# Patient Record
Sex: MALE | Race: WHITE | Employment: OTHER | URBAN - METROPOLITAN AREA
[De-identification: names, ages, dates, MRNs, and addresses within clinical notes are randomized per-mention and may not be internally consistent; named-entity substitution may affect disease eponyms.]

---

## 2017-01-04 DIAGNOSIS — J42 CHRONIC BRONCHITIS, UNSPECIFIED CHRONIC BRONCHITIS TYPE (HCC): ICD-10-CM

## 2017-01-30 ENCOUNTER — OFFICE VISIT (OUTPATIENT)
Dept: INTERNAL MEDICINE CLINIC | Age: 72
End: 2017-01-30

## 2017-01-30 VITALS
OXYGEN SATURATION: 95 % | WEIGHT: 138.6 LBS | SYSTOLIC BLOOD PRESSURE: 122 MMHG | DIASTOLIC BLOOD PRESSURE: 80 MMHG | HEIGHT: 67 IN | RESPIRATION RATE: 14 BRPM | BODY MASS INDEX: 21.75 KG/M2

## 2017-01-30 DIAGNOSIS — I70.0 ATHEROSCLEROSIS OF AORTIC ARCH (HCC): Primary | ICD-10-CM

## 2017-01-30 DIAGNOSIS — E78.2 HYPERLIPIDEMIA, MIXED: ICD-10-CM

## 2017-01-30 DIAGNOSIS — Z00.00 PREVENTATIVE HEALTH CARE: ICD-10-CM

## 2017-01-30 DIAGNOSIS — J42 CHRONIC BRONCHITIS, UNSPECIFIED CHRONIC BRONCHITIS TYPE (HCC): ICD-10-CM

## 2017-01-30 DIAGNOSIS — R07.89 ATYPICAL CHEST PAIN: ICD-10-CM

## 2017-01-30 PROCEDURE — 99214 OFFICE O/P EST MOD 30 MIN: CPT | Performed by: INTERNAL MEDICINE

## 2017-01-30 RX ORDER — ALBUTEROL SULFATE 90 UG/1
2 AEROSOL, METERED RESPIRATORY (INHALATION) EVERY 6 HOURS PRN
Qty: 1 INHALER | Refills: 2 | Status: SHIPPED | OUTPATIENT
Start: 2017-01-30 | End: 2021-05-10 | Stop reason: SDUPTHER

## 2017-01-30 RX ORDER — ASPIRIN 81 MG/1
81 TABLET ORAL DAILY
Qty: 30 TABLET | Refills: 3 | Status: SHIPPED | OUTPATIENT
Start: 2017-01-30 | End: 2019-12-03

## 2017-01-30 RX ORDER — ATORVASTATIN CALCIUM 10 MG/1
10 TABLET, FILM COATED ORAL DAILY
Qty: 30 TABLET | Refills: 2 | Status: SHIPPED | OUTPATIENT
Start: 2017-01-30 | End: 2018-01-29 | Stop reason: SDUPTHER

## 2017-02-04 PROBLEM — Z98.890 S/P COLONOSCOPY: Status: ACTIVE | Noted: 2017-02-01

## 2017-07-26 DIAGNOSIS — Z00.00 PREVENTATIVE HEALTH CARE: ICD-10-CM

## 2017-07-26 DIAGNOSIS — Z00.00 PREVENTATIVE HEALTH CARE: Primary | ICD-10-CM

## 2017-07-26 DIAGNOSIS — E78.2 HYPERLIPIDEMIA, MIXED: ICD-10-CM

## 2017-07-26 LAB
A/G RATIO: 2 (ref 1.1–2.2)
ALBUMIN SERPL-MCNC: 4.6 G/DL (ref 3.4–5)
ALP BLD-CCNC: 55 U/L (ref 40–129)
ALT SERPL-CCNC: 42 U/L (ref 10–40)
ANION GAP SERPL CALCULATED.3IONS-SCNC: 16 MMOL/L (ref 3–16)
AST SERPL-CCNC: 17 U/L (ref 15–37)
BILIRUB SERPL-MCNC: <0.2 MG/DL (ref 0–1)
BUN BLDV-MCNC: 25 MG/DL (ref 7–20)
CALCIUM SERPL-MCNC: 9.1 MG/DL (ref 8.3–10.6)
CHLORIDE BLD-SCNC: 99 MMOL/L (ref 99–110)
CHOLESTEROL, TOTAL: 249 MG/DL (ref 0–199)
CO2: 25 MMOL/L (ref 21–32)
CREAT SERPL-MCNC: 1 MG/DL (ref 0.8–1.3)
GFR AFRICAN AMERICAN: >60
GFR NON-AFRICAN AMERICAN: >60
GLOBULIN: 2.3 G/DL
GLUCOSE BLD-MCNC: 107 MG/DL (ref 70–99)
HDLC SERPL-MCNC: 68 MG/DL (ref 40–60)
HEPATITIS C ANTIBODY INTERPRETATION: NORMAL
LDL CHOLESTEROL CALCULATED: ABNORMAL MG/DL
LDL CHOLESTEROL DIRECT: 74 MG/DL
POTASSIUM SERPL-SCNC: 4.6 MMOL/L (ref 3.5–5.1)
SODIUM BLD-SCNC: 140 MMOL/L (ref 136–145)
TOTAL PROTEIN: 6.9 G/DL (ref 6.4–8.2)
TRIGL SERPL-MCNC: 660 MG/DL (ref 0–150)
VLDLC SERPL CALC-MCNC: ABNORMAL MG/DL

## 2017-07-28 ENCOUNTER — OFFICE VISIT (OUTPATIENT)
Dept: INTERNAL MEDICINE CLINIC | Age: 72
End: 2017-07-28

## 2017-07-28 VITALS
WEIGHT: 144 LBS | OXYGEN SATURATION: 98 % | SYSTOLIC BLOOD PRESSURE: 140 MMHG | RESPIRATION RATE: 20 BRPM | HEART RATE: 76 BPM | DIASTOLIC BLOOD PRESSURE: 80 MMHG | BODY MASS INDEX: 22.55 KG/M2

## 2017-07-28 DIAGNOSIS — I73.9 PVD (PERIPHERAL VASCULAR DISEASE) WITH CLAUDICATION (HCC): Primary | ICD-10-CM

## 2017-07-28 DIAGNOSIS — C20 RECTAL CANCER (HCC): ICD-10-CM

## 2017-07-28 DIAGNOSIS — Z23 NEED FOR PNEUMOCOCCAL VACCINATION: ICD-10-CM

## 2017-07-28 DIAGNOSIS — J42 CHRONIC BRONCHITIS, UNSPECIFIED CHRONIC BRONCHITIS TYPE (HCC): ICD-10-CM

## 2017-07-28 DIAGNOSIS — E78.2 HYPERLIPIDEMIA, MIXED: ICD-10-CM

## 2017-07-28 DIAGNOSIS — I70.0 ATHEROSCLEROSIS OF AORTIC ARCH (HCC): ICD-10-CM

## 2017-07-28 PROCEDURE — 99214 OFFICE O/P EST MOD 30 MIN: CPT | Performed by: INTERNAL MEDICINE

## 2017-07-28 PROCEDURE — G0009 ADMIN PNEUMOCOCCAL VACCINE: HCPCS | Performed by: INTERNAL MEDICINE

## 2017-07-28 PROCEDURE — 90732 PPSV23 VACC 2 YRS+ SUBQ/IM: CPT | Performed by: INTERNAL MEDICINE

## 2017-07-28 PROCEDURE — G8510 SCR DEP NEG, NO PLAN REQD: HCPCS | Performed by: INTERNAL MEDICINE

## 2017-07-28 PROCEDURE — 3288F FALL RISK ASSESSMENT DOCD: CPT | Performed by: INTERNAL MEDICINE

## 2017-07-28 ASSESSMENT — PATIENT HEALTH QUESTIONNAIRE - PHQ9
1. LITTLE INTEREST OR PLEASURE IN DOING THINGS: 0
SUM OF ALL RESPONSES TO PHQ QUESTIONS 1-9: 0
2. FEELING DOWN, DEPRESSED OR HOPELESS: 0
SUM OF ALL RESPONSES TO PHQ9 QUESTIONS 1 & 2: 0

## 2017-08-17 ENCOUNTER — HOSPITAL ENCOUNTER (OUTPATIENT)
Dept: ULTRASOUND IMAGING | Age: 72
Discharge: HOME | End: 2017-08-17
Attending: INTERNAL MEDICINE | Admitting: INTERNAL MEDICINE

## 2017-08-17 DIAGNOSIS — I73.9 PVD (PERIPHERAL VASCULAR DISEASE) WITH CLAUDICATION (HCC): ICD-10-CM

## 2017-08-17 DIAGNOSIS — I73.9 PERIPHERAL VASCULAR DISEASE (HCC): ICD-10-CM

## 2017-08-18 DIAGNOSIS — I73.9 PERIPHERAL VASCULAR DISEASE (HCC): Primary | ICD-10-CM

## 2018-01-26 DIAGNOSIS — E78.2 HYPERLIPIDEMIA, MIXED: ICD-10-CM

## 2018-01-26 DIAGNOSIS — I70.0 ATHEROSCLEROSIS OF AORTIC ARCH (HCC): Primary | ICD-10-CM

## 2018-01-27 LAB
A/G RATIO: 2.1 (ref 1.1–2.2)
ALBUMIN SERPL-MCNC: 4.6 G/DL (ref 3.4–5)
ALP BLD-CCNC: 53 U/L (ref 40–129)
ALT SERPL-CCNC: 31 U/L (ref 10–40)
ANION GAP SERPL CALCULATED.3IONS-SCNC: 18 MMOL/L (ref 3–16)
AST SERPL-CCNC: 21 U/L (ref 15–37)
BILIRUB SERPL-MCNC: 0.3 MG/DL (ref 0–1)
BUN BLDV-MCNC: 22 MG/DL (ref 7–20)
CALCIUM SERPL-MCNC: 9.4 MG/DL (ref 8.3–10.6)
CHLORIDE BLD-SCNC: 97 MMOL/L (ref 99–110)
CHOLESTEROL, TOTAL: 202 MG/DL (ref 0–199)
CO2: 25 MMOL/L (ref 21–32)
CREAT SERPL-MCNC: 1.1 MG/DL (ref 0.8–1.3)
GFR AFRICAN AMERICAN: >60
GFR NON-AFRICAN AMERICAN: >60
GLOBULIN: 2.2 G/DL
GLUCOSE BLD-MCNC: 96 MG/DL (ref 70–99)
HDLC SERPL-MCNC: 71 MG/DL (ref 40–60)
LDL CHOLESTEROL CALCULATED: 80 MG/DL
POTASSIUM SERPL-SCNC: 4.6 MMOL/L (ref 3.5–5.1)
SODIUM BLD-SCNC: 140 MMOL/L (ref 136–145)
TOTAL PROTEIN: 6.8 G/DL (ref 6.4–8.2)
TRIGL SERPL-MCNC: 255 MG/DL (ref 0–150)
VLDLC SERPL CALC-MCNC: 51 MG/DL

## 2018-01-29 ENCOUNTER — OFFICE VISIT (OUTPATIENT)
Dept: INTERNAL MEDICINE CLINIC | Age: 73
End: 2018-01-29

## 2018-01-29 VITALS
OXYGEN SATURATION: 93 % | BODY MASS INDEX: 22.55 KG/M2 | RESPIRATION RATE: 16 BRPM | SYSTOLIC BLOOD PRESSURE: 160 MMHG | WEIGHT: 144 LBS | DIASTOLIC BLOOD PRESSURE: 90 MMHG | HEART RATE: 73 BPM

## 2018-01-29 DIAGNOSIS — C20 RECTAL CANCER (HCC): ICD-10-CM

## 2018-01-29 DIAGNOSIS — J42 CHRONIC BRONCHITIS, UNSPECIFIED CHRONIC BRONCHITIS TYPE (HCC): ICD-10-CM

## 2018-01-29 DIAGNOSIS — I70.0 ATHEROSCLEROSIS OF AORTIC ARCH (HCC): ICD-10-CM

## 2018-01-29 DIAGNOSIS — I73.9 PVD (PERIPHERAL VASCULAR DISEASE) WITH CLAUDICATION (HCC): ICD-10-CM

## 2018-01-29 DIAGNOSIS — Z00.00 ROUTINE GENERAL MEDICAL EXAMINATION AT A HEALTH CARE FACILITY: Primary | ICD-10-CM

## 2018-01-29 DIAGNOSIS — I10 ESSENTIAL HYPERTENSION: ICD-10-CM

## 2018-01-29 DIAGNOSIS — I73.9 PAD (PERIPHERAL ARTERY DISEASE) (HCC): ICD-10-CM

## 2018-01-29 DIAGNOSIS — E78.2 HYPERLIPIDEMIA, MIXED: ICD-10-CM

## 2018-01-29 PROCEDURE — G8598 ASA/ANTIPLAT THER USED: HCPCS | Performed by: INTERNAL MEDICINE

## 2018-01-29 PROCEDURE — G0439 PPPS, SUBSEQ VISIT: HCPCS | Performed by: INTERNAL MEDICINE

## 2018-01-29 RX ORDER — ATORVASTATIN CALCIUM 10 MG/1
10 TABLET, FILM COATED ORAL DAILY
Qty: 90 TABLET | Refills: 1 | Status: SHIPPED | OUTPATIENT
Start: 2018-01-29 | End: 2018-07-22 | Stop reason: SDUPTHER

## 2018-01-29 ASSESSMENT — PATIENT HEALTH QUESTIONNAIRE - PHQ9: SUM OF ALL RESPONSES TO PHQ QUESTIONS 1-9: 0

## 2018-01-29 ASSESSMENT — LIFESTYLE VARIABLES
HOW OFTEN DURING THE LAST YEAR HAVE YOU HAD A FEELING OF GUILT OR REMORSE AFTER DRINKING: 0
HOW OFTEN DURING THE LAST YEAR HAVE YOU FAILED TO DO WHAT WAS NORMALLY EXPECTED FROM YOU BECAUSE OF DRINKING: 0
HOW OFTEN DURING THE LAST YEAR HAVE YOU BEEN UNABLE TO REMEMBER WHAT HAPPENED THE NIGHT BEFORE BECAUSE YOU HAD BEEN DRINKING: 0
HAS A RELATIVE, FRIEND, DOCTOR, OR ANOTHER HEALTH PROFESSIONAL EXPRESSED CONCERN ABOUT YOUR DRINKING OR SUGGESTED YOU CUT DOWN: 0
HOW OFTEN DO YOU HAVE SIX OR MORE DRINKS ON ONE OCCASION: 3
HOW OFTEN DURING THE LAST YEAR HAVE YOU FOUND THAT YOU WERE NOT ABLE TO STOP DRINKING ONCE YOU HAD STARTED: 0
HOW MANY STANDARD DRINKS CONTAINING ALCOHOL DO YOU HAVE ON A TYPICAL DAY: 1
HOW OFTEN DURING THE LAST YEAR HAVE YOU NEEDED AN ALCOHOLIC DRINK FIRST THING IN THE MORNING TO GET YOURSELF GOING AFTER A NIGHT OF HEAVY DRINKING: 0
AUDIT TOTAL SCORE: 5
HAVE YOU OR SOMEONE ELSE BEEN INJURED AS A RESULT OF YOUR DRINKING: 0
HOW OFTEN DO YOU HAVE A DRINK CONTAINING ALCOHOL: 1
AUDIT-C TOTAL SCORE: 5

## 2018-01-29 ASSESSMENT — ANXIETY QUESTIONNAIRES: GAD7 TOTAL SCORE: 0

## 2018-01-29 NOTE — PATIENT INSTRUCTIONS
Personalized Preventive Plan for Lisa Garcia - 1/29/2018  Medicare offers a range of preventive health benefits. Some of the tests and screenings are paid in full while other may be subject to a deductible, co-insurance, and/or copay. Some of these benefits include a comprehensive review of your medical history including lifestyle, illnesses that may run in your family, and various assessments and screenings as appropriate. After reviewing your medical record and screening and assessments performed today your provider may have ordered immunizations, labs, imaging, and/or referrals for you. A list of these orders (if applicable) as well as your Preventive Care list are included within your After Visit Summary for your review. Other Preventive Recommendations:    · A preventive eye exam performed by an eye specialist is recommended every 1-2 years to screen for glaucoma; cataracts, macular degeneration, and other eye disorders. · A preventive dental visit is recommended every 6 months. · Try to get at least 150 minutes of exercise per week or 10,000 steps per day on a pedometer . · Order or download the FREE \"Exercise & Physical Activity: Your Everyday Guide\" from [employer]. Call 6-396.951.8859 or search [employer] online. · You need 9231-7815 mg of calcium and 9751-8583 IU of vitamin D per day. It is possible to meet your calcium requirement with diet alone, but a vitamin D supplement is usually necessary to meet this goal.  · When exposed to the sun, use a sunscreen that protects against both UVA and UVB radiation with an SPF of 30 or greater. Reapply every 2 to 3 hours or after sweating, drying off with a towel, or swimming. · Always wear a seat belt when traveling in a car. Always wear a helmet when riding a bicycle or motorcycle.

## 2018-01-29 NOTE — PROGRESS NOTES
Medicare Annual Wellness Visit  Name: Ej Mcnair Date: 2018   MRN: [id number] Sex: Male   Age: 67 y.o. Ethnicity: Non-/Non    : 1945 Race: Joseluis Castaneda is here for Medicare AWV    Screenings for behavioral, psychosocial and functional/safety risks, and cognitive dysfunction are all negative except as indicated below. These results, as well as other patient data from the [address] form, are documented in Flowsheets linked to this Encounter. Had bilat leg PTA w Dr Ge Davis and prior leg claudication has resolved. Could not find the procedure note in EPIC. Is now on plavix. COPD has been stable without any recent cough or wheezing, SOB, fevers, chills. ALSO NOT SMOKING SINCE LAST YR.    BP high w/o prior hx of HTN. Denies sx cp or sob. RECHECK BP L /90. Lipids:  Is continuing statin therapy and low fat diet. Tolerating medications w/o myalgias or GI upset. On lipitor avg ~2-3x/week. Hx of atherosclerosis of aortic arch, no sx cp, we are addressing risk factors incl bp, chol, is not smoking. Hx of rectal CA and had colonoscopy w Dr Rendell Curling last yr, due now in 4 yrs recheck. No Known Allergies  Prior to Visit Medications    Medication Sig Taking?  Authorizing Provider   clopidogrel (PLAVIX) 75 MG tablet Take 1 tablet by mouth daily Yes Ge Davis MD   albuterol sulfate HFA (PROAIR HFA) 108 (90 BASE) MCG/ACT inhaler Inhale 2 puffs into the lungs every 6 hours as needed for Wheezing or Shortness of Breath Yes Marisa Chaidez MD   atorvastatin (LIPITOR) 10 MG tablet Take 1 tablet by mouth daily Yes Marisa Chaidez MD   aspirin EC 81 MG EC tablet Take 1 tablet by mouth daily Yes Marisa Chaidez MD     Past Medical History:   Diagnosis Date    Atherosclerosis of aortic arch ([address]     NOTED ON CXR 2017- CONSIDER CARDIO CONSULT    Atypical chest pain     COPD (chronic obstructive no carotid bruits  Abdomen: soft, non-tender, non-distended, normal bowel sounds, no masses or organomegaly  Extremities: no cyanosis, clubbing or edema  Musculoskeletal: normal range of motion, no joint swelling, deformity or tenderness  Neurologic: no cranial nerve deficit, gait, coordination and speech normal    Patient's complete Health Risk Assessment and screening values have been reviewed and are found in Flowsheets. The following problems were reviewed today and where indicated follow up appointments were made and/or referrals ordered. Positive Risk Factor Screenings with Interventions:     Health Habits/Nutrition:  Health Habits/Nutrition  Do you exercise for at least 20 minutes 2-3 times per week?: Yes  Have you lost any weight without trying in the past 3 months?: No  Do you eat fewer than 2 meals per day?: No  Have you seen a dentist within the past year?: (!) No  Body mass index is 22.55 kg/m².   Health Habits/Nutrition Interventions:  · has all dentures/edentulous    Hearing/Vision:  Hearing/Vision  Do you or your family notice any trouble with your hearing?: No  Do you have difficulty driving, watching TV, or doing any of your daily activities because of your eyesight?: (!) Yes  Have you had an eye exam within the past year?: (!) No  Hearing/Vision Interventions:  · due for eye check and to consider hearing test      Personalized Preventive Plan   Current Health Maintenance Status  Immunization History   Administered Date(s) Administered    Influenza Virus Vaccine 09/30/2013    Influenza, High Dose 10/12/2017    Pneumococcal 13-valent Conjugate ([id number]) 10/31/2016    Pneumococcal Polysaccharide ([id number]) 07/28/2017        Health Maintenance   Topic Date Due    DTaP/Tdap/Td vaccine (1 - Tdap) 12/27/1964    Zostavax vaccine  12/27/2005    Lipid screen  01/26/2023    Colon cancer screen colonoscopy  02/01/2027    Flu vaccine  Completed    Pneumococcal low/med risk  Completed    AAA screen  Completed    Hepatitis C screen  Completed     Recommendations for Preventive Services Due: see orders. Recommended screening schedule for the next 5-10 years is provided to the patient in written form: see Patient Rony Barber was seen today for medicare awv. Diagnoses and all orders for this visit:    Routine general medical examination at a health care facility - remains independent, functional and active, no indications/needs for other interventions noted at this time- except as noted below and also findings noted on screening medicare questions. Essential hypertension - elevated bp is c/w sl labile but most likely essential HTN. Proceed w low dose lopressor and monitor, check lab  -     metoprolol tartrate (LOPRESSOR) 25 MG tablet; Take 0.5 tablets by mouth 2 times daily  -     Urinalysis    PVD (peripheral vascular disease) with claudication ([address]- improved s/p PTA, check lab and cont f/u vasc surg  -     Lipid Panel  -     Comprehensive Metabolic Panel  -     CBC Auto Differential    PAD (peripheral artery disease) (HCC)-   -     Lipid Panel  -     Comprehensive Metabolic Panel  -     CBC Auto Differential    Atherosclerosis of aortic arch (HCC)- addressing vasc factors - addressing vasc factors, espec w chol improved on statin therapy and also will add low dose lopressor now too. -     atorvastatin (LIPITOR) 10 MG tablet; Take 1 tablet by mouth daily  -     Lipid Panel  -     Comprehensive Metabolic Panel  -     CBC Auto Differential    Chronic bronchitis, unspecified chronic bronchitis type (HCC) - COPD stable on current regiment of inhalers/meds. Much better as well since quit smoking      Hyperlipidemia, mixed - Pt will continue to work on a low fat diet and also exercise, wt loss as appropriate. Will continue periodic monitoring of fasting lipid profile, glucose, liver function. -     atorvastatin (LIPITOR) 10 MG tablet;  Take 1 tablet by mouth daily  -     TSH

## 2018-07-02 ENCOUNTER — HOSPITAL ENCOUNTER (OUTPATIENT)
Dept: GENERAL RADIOLOGY | Age: 73
Discharge: HOME | End: 2018-07-02
Attending: INTERNAL MEDICINE | Admitting: INTERNAL MEDICINE

## 2018-07-02 ENCOUNTER — INITIAL CONSULT (OUTPATIENT)
Dept: PULMONOLOGY | Age: 73
End: 2018-07-02

## 2018-07-02 VITALS
DIASTOLIC BLOOD PRESSURE: 78 MMHG | HEART RATE: 90 BPM | HEIGHT: 67 IN | OXYGEN SATURATION: 96 % | WEIGHT: 137 LBS | SYSTOLIC BLOOD PRESSURE: 132 MMHG | BODY MASS INDEX: 21.5 KG/M2

## 2018-07-02 DIAGNOSIS — R05.9 COUGH: ICD-10-CM

## 2018-07-02 DIAGNOSIS — J42 CHRONIC BRONCHITIS, UNSPECIFIED CHRONIC BRONCHITIS TYPE (HCC): ICD-10-CM

## 2018-07-02 DIAGNOSIS — R91.8 ABNORMAL CT LUNG SCREENING: Primary | ICD-10-CM

## 2018-07-02 DIAGNOSIS — J42 ACUTE EXACERBATION OF CHRONIC BRONCHITIS (HCC): ICD-10-CM

## 2018-07-02 DIAGNOSIS — J43.9 PULMONARY EMPHYSEMA, UNSPECIFIED EMPHYSEMA TYPE (HCC): ICD-10-CM

## 2018-07-02 DIAGNOSIS — J20.9 ACUTE EXACERBATION OF CHRONIC BRONCHITIS (HCC): ICD-10-CM

## 2018-07-02 PROCEDURE — G8598 ASA/ANTIPLAT THER USED: HCPCS | Performed by: INTERNAL MEDICINE

## 2018-07-02 PROCEDURE — 99204 OFFICE O/P NEW MOD 45 MIN: CPT | Performed by: INTERNAL MEDICINE

## 2018-07-02 PROCEDURE — 4004F PT TOBACCO SCREEN RCVD TLK: CPT | Performed by: INTERNAL MEDICINE

## 2018-07-02 PROCEDURE — 3023F SPIROM DOC REV: CPT | Performed by: INTERNAL MEDICINE

## 2018-07-02 PROCEDURE — 94060 EVALUATION OF WHEEZING: CPT | Performed by: INTERNAL MEDICINE

## 2018-07-02 PROCEDURE — G8926 SPIRO NO PERF OR DOC: HCPCS | Performed by: INTERNAL MEDICINE

## 2018-07-02 PROCEDURE — 1123F ACP DISCUSS/DSCN MKR DOCD: CPT | Performed by: INTERNAL MEDICINE

## 2018-07-02 PROCEDURE — G8427 DOCREV CUR MEDS BY ELIG CLIN: HCPCS | Performed by: INTERNAL MEDICINE

## 2018-07-02 PROCEDURE — 3017F COLORECTAL CA SCREEN DOC REV: CPT | Performed by: INTERNAL MEDICINE

## 2018-07-02 PROCEDURE — 4040F PNEUMOC VAC/ADMIN/RCVD: CPT | Performed by: INTERNAL MEDICINE

## 2018-07-02 PROCEDURE — G8420 CALC BMI NORM PARAMETERS: HCPCS | Performed by: INTERNAL MEDICINE

## 2018-07-02 RX ORDER — FLUTICASONE FUROATE AND VILANTEROL 100; 25 UG/1; UG/1
1 POWDER RESPIRATORY (INHALATION) DAILY
Qty: 1 EACH | Refills: 3 | Status: SHIPPED | OUTPATIENT
Start: 2018-07-02 | End: 2019-01-30 | Stop reason: SDUPTHER

## 2018-07-02 ASSESSMENT — SLEEP AND FATIGUE QUESTIONNAIRES
HOW LIKELY ARE YOU TO NOD OFF OR FALL ASLEEP WHEN YOU ARE A PASSENGER IN A CAR FOR AN HOUR WITHOUT A BREAK: 1
HOW LIKELY ARE YOU TO NOD OFF OR FALL ASLEEP WHILE WATCHING TV: 1
HOW LIKELY ARE YOU TO NOD OFF OR FALL ASLEEP WHILE LYING DOWN TO REST IN THE AFTERNOON WHEN CIRCUMSTANCES PERMIT: 3
NECK CIRCUMFERENCE (INCHES): 16
HOW LIKELY ARE YOU TO NOD OFF OR FALL ASLEEP WHILE SITTING AND READING: 0
HOW LIKELY ARE YOU TO NOD OFF OR FALL ASLEEP WHILE SITTING AND TALKING TO SOMEONE: 0
HOW LIKELY ARE YOU TO NOD OFF OR FALL ASLEEP WHILE SITTING INACTIVE IN A PUBLIC PLACE: 1
HOW LIKELY ARE YOU TO NOD OFF OR FALL ASLEEP IN A CAR, WHILE STOPPED FOR A FEW MINUTES IN TRAFFIC: 0
ESS TOTAL SCORE: 7
HOW LIKELY ARE YOU TO NOD OFF OR FALL ASLEEP WHILE SITTING QUIETLY AFTER LUNCH WITHOUT ALCOHOL: 1

## 2018-07-02 ASSESSMENT — ENCOUNTER SYMPTOMS
GASTROINTESTINAL NEGATIVE: 1
COUGH: 1
EYES NEGATIVE: 1
SPUTUM PRODUCTION: 1
SHORTNESS OF BREATH: 1

## 2018-07-02 NOTE — PROGRESS NOTES
Procedure Laterality Date    ANGIOPLASTY Bilateral 2017    Dr Von Pinto for bilat leg PVD    COLECTOMY  2009    Dr Rasheeda Guthrie then needed revision at [address] Marital status:      Spouse name: Curt Russell Number of children: N/A    Years of education: N/A     Occupational History    retired      Social History Main Topics    Smoking status: Current Every Day Smoker     Types: Cigarettes    Smokeless tobacco: Never Used    Alcohol use 12.0 oz/week     20 Cans of beer per week    Drug use: No    Sexual activity: Not Asked     Other Topics Concern    None     Social History Narrative    None       Review of Systems   Constitutional: Negative. HENT: Negative. Eyes: Negative. Respiratory: Positive for cough, sputum production and shortness of breath. Cardiovascular: Negative. Gastrointestinal: Negative. Genitourinary: Negative. Musculoskeletal: Negative. Skin: Negative. Neurological: Negative. Endo/Heme/Allergies: Negative. Psychiatric/Behavioral: Negative. Objective:     Vitals:    07/02/18 1102   BP: 132/78   Pulse: 90   SpO2: 96%     Body mass index is 21.46 kg/m². Sleep Medicine 7/2/2018   Sitting and reading 0   Watching TV 1   Sitting, inactive in a public place (e.g. a theatre or a meeting) 1   As a passenger in a car for an hour without a break 1   Lying down to rest in the afternoon when circumstances permit 3   Sitting and talking to someone 0   Sitting quietly after a lunch without alcohol 1   In a car, while stopped for a few minutes in traffic 0   Total score 7   Neck circumference 16     Mallampati 1    Physical Exam   Constitutional: He is oriented to person, place, and time. He appears well-developed and well-nourished. HENT:   Head: Normocephalic and atraumatic. Eyes: Conjunctivae are normal. Pupils are equal, round, and reactive to light. Neck: Normal range of motion. Neck supple.    Cardiovascular: Normal rate, regular rhythm and normal heart sounds. Pulmonary/Chest: Effort normal and breath sounds normal.   Abdominal: Soft. Bowel sounds are normal.   Musculoskeletal: Normal range of motion. Neurological: He is alert and oriented to person, place, and time. Skin: Skin is warm and dry. Vitals reviewed. Assessment and Plan     Problem List     COPD (chronic obstructive pulmonary disease) ([address]     Advised to quit smoking  Ventolin PRN  Get yearly flu vaccine  Spirometry  Low dose CT chest         Relevant Medications    albuterol sulfate HFA (PROAIR HFA) 108 (90 BASE) MCG/ACT inhaler    diphenhydrAMINE (BENADRYL) tablet 50 mg (Completed)    diphenhydrAMINE (BENADRYL) tablet 50 mg (Completed)    Other Relevant Orders    XR CHEST STANDARD (2 VW)    Spirometry with bronchodilator    CT Chest WO Contrast (Non-Lung screening)    Cough     It appears likely sec to the COPD  I'll do a Spirometry with probable puffer  and a CXR           Relevant Orders    XR CHEST STANDARD (2 VW)    Spirometry with bronchodilator    CT Chest WO Contrast (Non-Lung screening)      Spirometry done in the office today shows that he has mixed moderate restrictive lung disease and very severe obstructive airway disease. I'll prescribe him Breo and Spiriva respimat. Return in about 4 weeks (around 7/30/2018) for PFT, Low dose CT chest, chest x ray.       Joie Cheema MD  7/2/2018  11:23 AM

## 2018-07-20 ENCOUNTER — HOSPITAL ENCOUNTER (OUTPATIENT)
Dept: CT IMAGING | Age: 73
Discharge: HOME | End: 2018-07-20
Attending: INTERNAL MEDICINE | Admitting: INTERNAL MEDICINE

## 2018-07-20 DIAGNOSIS — R91.8 OTHER NONSPECIFIC ABNORMAL FINDING OF LUNG FIELD (CODE): ICD-10-CM

## 2018-07-20 DIAGNOSIS — R91.8 LUNG MASS: ICD-10-CM

## 2018-07-22 DIAGNOSIS — I70.0 ATHEROSCLEROSIS OF AORTIC ARCH (HCC): ICD-10-CM

## 2018-07-22 DIAGNOSIS — E78.2 HYPERLIPIDEMIA, MIXED: ICD-10-CM

## 2018-07-22 DIAGNOSIS — I10 ESSENTIAL HYPERTENSION: ICD-10-CM

## 2018-07-23 RX ORDER — ATORVASTATIN CALCIUM 10 MG/1
10 TABLET, FILM COATED ORAL DAILY
Qty: 90 TABLET | Refills: 1 | Status: SHIPPED | OUTPATIENT
Start: 2018-07-23 | End: 2019-01-16 | Stop reason: SDUPTHER

## 2018-07-25 PROBLEM — I77.1 SUBCLAVIAN ARTERY STENOSIS, RIGHT (HCC): Status: ACTIVE | Noted: 2018-07-25

## 2018-07-30 ENCOUNTER — OFFICE VISIT (OUTPATIENT)
Dept: INTERNAL MEDICINE CLINIC | Age: 73
End: 2018-07-30

## 2018-07-30 VITALS
SYSTOLIC BLOOD PRESSURE: 136 MMHG | DIASTOLIC BLOOD PRESSURE: 74 MMHG | OXYGEN SATURATION: 96 % | BODY MASS INDEX: 21.3 KG/M2 | RESPIRATION RATE: 18 BRPM | WEIGHT: 136 LBS | HEART RATE: 72 BPM

## 2018-07-30 DIAGNOSIS — I10 ESSENTIAL HYPERTENSION: ICD-10-CM

## 2018-07-30 DIAGNOSIS — I77.1 SUBCLAVIAN ARTERY STENOSIS, RIGHT (HCC): ICD-10-CM

## 2018-07-30 DIAGNOSIS — H35.30 MACULAR DEGENERATION, LEFT EYE: ICD-10-CM

## 2018-07-30 DIAGNOSIS — R91.1 PULMONARY NODULE, LEFT: Primary | ICD-10-CM

## 2018-07-30 DIAGNOSIS — E78.2 HYPERLIPIDEMIA, MIXED: ICD-10-CM

## 2018-07-30 DIAGNOSIS — J42 CHRONIC BRONCHITIS, UNSPECIFIED CHRONIC BRONCHITIS TYPE (HCC): ICD-10-CM

## 2018-07-30 PROCEDURE — G8926 SPIRO NO PERF OR DOC: HCPCS | Performed by: INTERNAL MEDICINE

## 2018-07-30 PROCEDURE — G8420 CALC BMI NORM PARAMETERS: HCPCS | Performed by: INTERNAL MEDICINE

## 2018-07-30 PROCEDURE — 1101F PT FALLS ASSESS-DOCD LE1/YR: CPT | Performed by: INTERNAL MEDICINE

## 2018-07-30 PROCEDURE — 1123F ACP DISCUSS/DSCN MKR DOCD: CPT | Performed by: INTERNAL MEDICINE

## 2018-07-30 PROCEDURE — 99214 OFFICE O/P EST MOD 30 MIN: CPT | Performed by: INTERNAL MEDICINE

## 2018-07-30 PROCEDURE — 3017F COLORECTAL CA SCREEN DOC REV: CPT | Performed by: INTERNAL MEDICINE

## 2018-07-30 PROCEDURE — 4040F PNEUMOC VAC/ADMIN/RCVD: CPT | Performed by: INTERNAL MEDICINE

## 2018-07-30 PROCEDURE — 3023F SPIROM DOC REV: CPT | Performed by: INTERNAL MEDICINE

## 2018-07-30 PROCEDURE — 4004F PT TOBACCO SCREEN RCVD TLK: CPT | Performed by: INTERNAL MEDICINE

## 2018-07-30 PROCEDURE — G8598 ASA/ANTIPLAT THER USED: HCPCS | Performed by: INTERNAL MEDICINE

## 2018-07-30 PROCEDURE — G8427 DOCREV CUR MEDS BY ELIG CLIN: HCPCS | Performed by: INTERNAL MEDICINE

## 2018-08-01 PROBLEM — R05.9 COUGH: Status: RESOLVED | Noted: 2018-07-02 | Resolved: 2018-08-01

## 2018-08-03 ENCOUNTER — OFFICE VISIT (OUTPATIENT)
Dept: PULMONOLOGY | Age: 73
End: 2018-08-03

## 2018-08-03 VITALS
OXYGEN SATURATION: 96 % | BODY MASS INDEX: 21.35 KG/M2 | DIASTOLIC BLOOD PRESSURE: 70 MMHG | SYSTOLIC BLOOD PRESSURE: 108 MMHG | WEIGHT: 136.02 LBS | HEART RATE: 68 BPM | HEIGHT: 67 IN

## 2018-08-03 DIAGNOSIS — R91.1 PULMONARY NODULE, LEFT: ICD-10-CM

## 2018-08-03 DIAGNOSIS — J18.9 PNEUMONIA OF LEFT LOWER LOBE DUE TO INFECTIOUS ORGANISM: Primary | ICD-10-CM

## 2018-08-03 DIAGNOSIS — J44.9 CHRONIC OBSTRUCTIVE PULMONARY DISEASE, UNSPECIFIED COPD TYPE (HCC): ICD-10-CM

## 2018-08-03 PROCEDURE — 4040F PNEUMOC VAC/ADMIN/RCVD: CPT | Performed by: INTERNAL MEDICINE

## 2018-08-03 PROCEDURE — 3023F SPIROM DOC REV: CPT | Performed by: INTERNAL MEDICINE

## 2018-08-03 PROCEDURE — 99214 OFFICE O/P EST MOD 30 MIN: CPT | Performed by: INTERNAL MEDICINE

## 2018-08-03 PROCEDURE — 1101F PT FALLS ASSESS-DOCD LE1/YR: CPT | Performed by: INTERNAL MEDICINE

## 2018-08-03 PROCEDURE — G8598 ASA/ANTIPLAT THER USED: HCPCS | Performed by: INTERNAL MEDICINE

## 2018-08-03 PROCEDURE — G8926 SPIRO NO PERF OR DOC: HCPCS | Performed by: INTERNAL MEDICINE

## 2018-08-03 PROCEDURE — G8420 CALC BMI NORM PARAMETERS: HCPCS | Performed by: INTERNAL MEDICINE

## 2018-08-03 PROCEDURE — 3017F COLORECTAL CA SCREEN DOC REV: CPT | Performed by: INTERNAL MEDICINE

## 2018-08-03 PROCEDURE — G8427 DOCREV CUR MEDS BY ELIG CLIN: HCPCS | Performed by: INTERNAL MEDICINE

## 2018-08-03 PROCEDURE — 1123F ACP DISCUSS/DSCN MKR DOCD: CPT | Performed by: INTERNAL MEDICINE

## 2018-08-03 PROCEDURE — 4004F PT TOBACCO SCREEN RCVD TLK: CPT | Performed by: INTERNAL MEDICINE

## 2018-08-03 RX ORDER — LEVOFLOXACIN 500 MG/1
500 TABLET, FILM COATED ORAL DAILY
Qty: 7 TABLET | Refills: 0 | Status: SHIPPED | OUTPATIENT
Start: 2018-08-03 | End: 2018-08-10

## 2018-08-03 ASSESSMENT — ENCOUNTER SYMPTOMS
SHORTNESS OF BREATH: 1
GASTROINTESTINAL NEGATIVE: 1
SPUTUM PRODUCTION: 1
EYES NEGATIVE: 1
COUGH: 1

## 2018-08-03 NOTE — PROGRESS NOTES
Katt Warren  1945      Subjective:     Chief Complaint   Patient presents with    Results     Pt here to discuss Ct scan results       HPI  Jericho Alvarez is a 67 y.o. male has come back as a follow up. He has a 50 pk yr smoking which he quit about 4 days ago. He had a spirometry done in our office and it showed that he has very severe COPD. He is on [place]. He says that it is helping him. He has little cough, little phlegm, no hemoptysis, no loss of weight, good appetite, some SOB on exertion. He had his flu vaccine and pneumovax. His low dose CT chest done on 07/20/18 showed that he has a 10 mm ground glass opcaity in the LLL and  Severe stenosis of the proximal right subclavian artery. He is being followed by Ms. Smith . Current Outpatient Prescriptions   Medication Sig Dispense Refill    atorvastatin (LIPITOR) 10 MG tablet TAKE 1 TABLET BY MOUTH DAILY 90 tablet 1    metoprolol tartrate (LOPRESSOR) 25 MG tablet TAKE 0.5 TABLETS BY MOUTH 2 TIMES DAILY 90 tablet 1    fluticasone-vilanterol (BREO ELLIPTA) 100-25 MCG/INH AEPB inhaler Inhale 1 puff into the lungs daily 1 each 3    tiotropium (SPIRIVA RESPIMAT) 2.5 MCG/ACT AERS inhaler Inhale 2 puffs into the lungs daily 1 Inhaler 3    albuterol sulfate HFA (PROAIR HFA) 108 (90 BASE) MCG/ACT inhaler Inhale 2 puffs into the lungs every 6 hours as needed for Wheezing or Shortness of Breath 1 Inhaler 2    aspirin EC 81 MG EC tablet Take 1 tablet by mouth daily 30 tablet 3     No current facility-administered medications for this visit.         No Known Allergies    Past Medical History:   Diagnosis Date    Atherosclerosis of aortic arch ([address]     NOTED ON CXR 1/2017- CONSIDER CARDIO CONSULT    Atypical chest pain     COPD (chronic obstructive pulmonary disease) ([address]     Essential hypertension 01/29/2018    Hyperlipidemia, mixed     PAD (peripheral artery disease) (HCC)     R leg on doppler 8/2017-- referring to Dr Hussein Ng Pulmonary Total score 7   Neck circumference 16     Mallampati 1    Physical Exam   Constitutional: He is oriented to person, place, and time. He appears well-developed and well-nourished. Thin   HENT:   Head: Normocephalic and atraumatic. Eyes: Conjunctivae are normal. Pupils are equal, round, and reactive to light. Neck: Normal range of motion. Neck supple. Cardiovascular: Normal rate, regular rhythm and normal heart sounds. Pulmonary/Chest: Effort normal and breath sounds normal.   Abdominal: Soft. Bowel sounds are normal.   Musculoskeletal: Normal range of motion. Neurological: He is alert and oriented to person, place, and time. Skin: Skin is warm and dry. Vitals reviewed. Radiology: CT chest reviewed    Assessment and Plan     Problem List     COPD (chronic obstructive pulmonary disease) [facility]     Advised to quit smoking   c/w inhalers  Get yearly flu vaccine  PFT's in 6 months  CT chest in 6 months or earlier if he has any symptoms  I'll get a PET scan  I'll give him 7 days of Levaquin           Relevant Medications    albuterol sulfate HFA (PROAIR HFA) 108 (90 BASE) MCG/ACT inhaler    diphenhydrAMINE (BENADRYL) tablet 50 mg (Completed)    diphenhydrAMINE (BENADRYL) tablet 50 mg (Completed)    fluticasone-vilanterol (BREO ELLIPTA) 100-25 MCG/INH AEPB inhaler    tiotropium (SPIRIVA RESPIMAT) 2.5 MCG/ACT AERS inhaler    Other Relevant Orders    FULL PFT STUDY WITH PRE AND POST    PET CT Skull Base To Mid Thigh    CT Chest W Contrast    Pulmonary nodule, left     He has a 10 mm LLL ground glass nodule ? BAL carcinoma vs Infection  I'll give him Levaquin  Get a PET scan and if negative will repeat CT chest in 6 months or earlier if he has any symptoms  He'll come in 2 to 3 weeks if the PET scan is done         Relevant Orders    PET CT Skull Base To Mid Thigh    CT Chest W Contrast               Return in about 6 months (around 2/3/2019) for PFT, CT chest, PET.       Raghavendra Howe MD  8/3/2018  10:48 AM

## 2018-08-08 PROBLEM — I65.23 CAROTID STENOSIS, BILATERAL: Status: ACTIVE | Noted: 2018-08-08

## 2018-08-13 ENCOUNTER — HOSPITAL ENCOUNTER (OUTPATIENT)
Dept: GENERAL RADIOLOGY | Age: 73
Discharge: HOME | End: 2018-08-13
Attending: INTERNAL MEDICINE | Admitting: INTERNAL MEDICINE

## 2018-08-13 DIAGNOSIS — R91.1 PULMONARY NODULE, LEFT: ICD-10-CM

## 2018-08-13 DIAGNOSIS — J44.9 CHRONIC OBSTRUCTIVE PULMONARY DISEASE, UNSPECIFIED COPD TYPE (HCC): ICD-10-CM

## 2018-08-17 ENCOUNTER — OFFICE VISIT (OUTPATIENT)
Dept: PULMONOLOGY | Age: 73
End: 2018-08-17

## 2018-08-17 VITALS
HEART RATE: 72 BPM | DIASTOLIC BLOOD PRESSURE: 78 MMHG | BODY MASS INDEX: 21.97 KG/M2 | SYSTOLIC BLOOD PRESSURE: 136 MMHG | OXYGEN SATURATION: 96 % | WEIGHT: 140 LBS | HEIGHT: 67 IN

## 2018-08-17 DIAGNOSIS — J44.9 CHRONIC OBSTRUCTIVE PULMONARY DISEASE, UNSPECIFIED COPD TYPE (HCC): ICD-10-CM

## 2018-08-17 DIAGNOSIS — R91.1 PULMONARY NODULE, LEFT: ICD-10-CM

## 2018-08-17 DIAGNOSIS — R91.1 PULMONARY NODULE, RIGHT: ICD-10-CM

## 2018-08-17 PROCEDURE — 1101F PT FALLS ASSESS-DOCD LE1/YR: CPT | Performed by: INTERNAL MEDICINE

## 2018-08-17 PROCEDURE — G8926 SPIRO NO PERF OR DOC: HCPCS | Performed by: INTERNAL MEDICINE

## 2018-08-17 PROCEDURE — 3017F COLORECTAL CA SCREEN DOC REV: CPT | Performed by: INTERNAL MEDICINE

## 2018-08-17 PROCEDURE — G8420 CALC BMI NORM PARAMETERS: HCPCS | Performed by: INTERNAL MEDICINE

## 2018-08-17 PROCEDURE — G8598 ASA/ANTIPLAT THER USED: HCPCS | Performed by: INTERNAL MEDICINE

## 2018-08-17 PROCEDURE — 4004F PT TOBACCO SCREEN RCVD TLK: CPT | Performed by: INTERNAL MEDICINE

## 2018-08-17 PROCEDURE — 3023F SPIROM DOC REV: CPT | Performed by: INTERNAL MEDICINE

## 2018-08-17 PROCEDURE — G8427 DOCREV CUR MEDS BY ELIG CLIN: HCPCS | Performed by: INTERNAL MEDICINE

## 2018-08-17 PROCEDURE — 1123F ACP DISCUSS/DSCN MKR DOCD: CPT | Performed by: INTERNAL MEDICINE

## 2018-08-17 PROCEDURE — 4040F PNEUMOC VAC/ADMIN/RCVD: CPT | Performed by: INTERNAL MEDICINE

## 2018-08-17 PROCEDURE — 99214 OFFICE O/P EST MOD 30 MIN: CPT | Performed by: INTERNAL MEDICINE

## 2018-08-17 ASSESSMENT — ENCOUNTER SYMPTOMS
EYES NEGATIVE: 1
SPUTUM PRODUCTION: 1
COUGH: 1
GASTROINTESTINAL NEGATIVE: 1

## 2018-09-10 ENCOUNTER — HOSPITAL ENCOUNTER (OUTPATIENT)
Dept: PULMONOLOGY | Age: 73
Discharge: HOME | End: 2018-09-10
Attending: INTERNAL MEDICINE | Admitting: INTERNAL MEDICINE

## 2018-11-09 ENCOUNTER — TELEPHONE (OUTPATIENT)
Dept: PULMONOLOGY | Age: 73
End: 2018-11-09

## 2018-11-13 DIAGNOSIS — R06.02 SOB (SHORTNESS OF BREATH): Primary | ICD-10-CM

## 2018-11-14 ENCOUNTER — HOSPITAL ENCOUNTER (OUTPATIENT)
Dept: CT IMAGING | Age: 73
Discharge: HOME | End: 2018-11-14
Payer: MEDICARE

## 2018-11-14 DIAGNOSIS — R91.1 PULMONARY NODULE, RIGHT: ICD-10-CM

## 2018-11-14 DIAGNOSIS — R91.1 PULMONARY NODULE, LEFT: ICD-10-CM

## 2018-11-14 LAB
GFR AFRICAN AMERICAN: >60 ML/MIN/1.73M2
GFR NON-AFRICAN AMERICAN: >60 ML/MIN/1.73M2
POC CREATININE: 0.9 MG/DL (ref 0.9–1.3)

## 2018-11-14 PROCEDURE — 71260 CT THORAX DX C+: CPT

## 2018-11-14 PROCEDURE — 2580000003 HC RX 258: Performed by: INTERNAL MEDICINE

## 2018-11-14 PROCEDURE — 6360000004 HC RX CONTRAST MEDICATION: Performed by: INTERNAL MEDICINE

## 2018-11-14 RX ORDER — SODIUM CHLORIDE 0.9 % (FLUSH) 0.9 %
10 SYRINGE (ML) INJECTION PRN
Status: DISCONTINUED | OUTPATIENT
Start: 2018-11-14 | End: 2018-11-15 | Stop reason: HOSPADM

## 2018-11-14 RX ADMIN — IOPAMIDOL 75 ML: 755 INJECTION, SOLUTION INTRAVENOUS at 09:08

## 2018-11-14 RX ADMIN — SODIUM CHLORIDE, PRESERVATIVE FREE 10 ML: 5 INJECTION INTRAVENOUS at 09:08

## 2018-11-19 ENCOUNTER — OFFICE VISIT (OUTPATIENT)
Dept: PULMONOLOGY | Age: 73
End: 2018-11-19
Payer: MEDICARE

## 2018-11-19 VITALS
SYSTOLIC BLOOD PRESSURE: 152 MMHG | OXYGEN SATURATION: 96 % | HEIGHT: 67 IN | DIASTOLIC BLOOD PRESSURE: 82 MMHG | HEART RATE: 76 BPM | WEIGHT: 143.4 LBS | BODY MASS INDEX: 22.51 KG/M2

## 2018-11-19 DIAGNOSIS — R91.1 PULMONARY NODULE, RIGHT: ICD-10-CM

## 2018-11-19 DIAGNOSIS — J44.9 CHRONIC OBSTRUCTIVE PULMONARY DISEASE, UNSPECIFIED COPD TYPE (HCC): ICD-10-CM

## 2018-11-19 DIAGNOSIS — R91.1 PULMONARY NODULE, LEFT: ICD-10-CM

## 2018-11-19 PROCEDURE — 3023F SPIROM DOC REV: CPT | Performed by: INTERNAL MEDICINE

## 2018-11-19 PROCEDURE — G8598 ASA/ANTIPLAT THER USED: HCPCS | Performed by: INTERNAL MEDICINE

## 2018-11-19 PROCEDURE — G8427 DOCREV CUR MEDS BY ELIG CLIN: HCPCS | Performed by: INTERNAL MEDICINE

## 2018-11-19 PROCEDURE — 1101F PT FALLS ASSESS-DOCD LE1/YR: CPT | Performed by: INTERNAL MEDICINE

## 2018-11-19 PROCEDURE — 4004F PT TOBACCO SCREEN RCVD TLK: CPT | Performed by: INTERNAL MEDICINE

## 2018-11-19 PROCEDURE — G8420 CALC BMI NORM PARAMETERS: HCPCS | Performed by: INTERNAL MEDICINE

## 2018-11-19 PROCEDURE — 99214 OFFICE O/P EST MOD 30 MIN: CPT | Performed by: INTERNAL MEDICINE

## 2018-11-19 PROCEDURE — 4040F PNEUMOC VAC/ADMIN/RCVD: CPT | Performed by: INTERNAL MEDICINE

## 2018-11-19 PROCEDURE — G8926 SPIRO NO PERF OR DOC: HCPCS | Performed by: INTERNAL MEDICINE

## 2018-11-19 PROCEDURE — G8484 FLU IMMUNIZE NO ADMIN: HCPCS | Performed by: INTERNAL MEDICINE

## 2018-11-19 PROCEDURE — 3017F COLORECTAL CA SCREEN DOC REV: CPT | Performed by: INTERNAL MEDICINE

## 2018-11-19 PROCEDURE — 1123F ACP DISCUSS/DSCN MKR DOCD: CPT | Performed by: INTERNAL MEDICINE

## 2018-11-19 ASSESSMENT — ENCOUNTER SYMPTOMS
EYE ITCHING: 0
ABDOMINAL PAIN: 0
ABDOMINAL DISTENTION: 0
COUGH: 0
EYE DISCHARGE: 0
BACK PAIN: 0
SHORTNESS OF BREATH: 1

## 2018-12-06 RX ORDER — SODIUM CHLORIDE 0.9 % (FLUSH) 0.9 %
10 SYRINGE (ML) INJECTION PRN
Status: CANCELLED | OUTPATIENT
Start: 2018-12-06

## 2018-12-07 ENCOUNTER — HOSPITAL ENCOUNTER (OUTPATIENT)
Dept: GENERAL RADIOLOGY | Age: 73
Discharge: HOME | End: 2018-12-07
Payer: MEDICARE

## 2018-12-07 ENCOUNTER — HOSPITAL ENCOUNTER (OUTPATIENT)
Dept: CT IMAGING | Age: 73
Discharge: HOME | End: 2018-12-07
Payer: MEDICARE

## 2018-12-07 VITALS
WEIGHT: 144 LBS | BODY MASS INDEX: 22.6 KG/M2 | HEIGHT: 67 IN | OXYGEN SATURATION: 97 % | RESPIRATION RATE: 16 BRPM | SYSTOLIC BLOOD PRESSURE: 116 MMHG | DIASTOLIC BLOOD PRESSURE: 69 MMHG | HEART RATE: 71 BPM | TEMPERATURE: 97 F

## 2018-12-07 DIAGNOSIS — R91.8 LUNG MASS: ICD-10-CM

## 2018-12-07 DIAGNOSIS — R91.1 PULMONARY NODULE, RIGHT: ICD-10-CM

## 2018-12-07 DIAGNOSIS — J44.9 CHRONIC OBSTRUCTIVE PULMONARY DISEASE, UNSPECIFIED COPD TYPE (HCC): ICD-10-CM

## 2018-12-07 LAB
APTT: 24.9 SECONDS (ref 21.2–33)
HCT VFR BLD CALC: 47.2 % (ref 42–52)
HEMOGLOBIN: 14.7 GM/DL (ref 13.5–18)
INR BLD: 0.99 INDEX
MCH RBC QN AUTO: 32.5 PG (ref 27–31)
MCHC RBC AUTO-ENTMCNC: 31.1 % (ref 32–36)
MCV RBC AUTO: 104.4 FL (ref 78–100)
PDW BLD-RTO: 13.2 % (ref 11.7–14.9)
PLATELET # BLD: 194 K/CU MM (ref 140–440)
PMV BLD AUTO: 9.6 FL (ref 7.5–11.1)
PROTHROMBIN TIME: 11.3 SECONDS (ref 9.12–12.5)
RBC # BLD: 4.52 M/CU MM (ref 4.6–6.2)
WBC # BLD: 5.9 K/CU MM (ref 4–10.5)

## 2018-12-07 PROCEDURE — 88305 TISSUE EXAM BY PATHOLOGIST: CPT

## 2018-12-07 PROCEDURE — 88333 PATH CONSLTJ SURG CYTO XM 1: CPT

## 2018-12-07 PROCEDURE — 7100000011 HC PHASE II RECOVERY - ADDTL 15 MIN

## 2018-12-07 PROCEDURE — 88341 IMHCHEM/IMCYTCHM EA ADD ANTB: CPT

## 2018-12-07 PROCEDURE — 85027 COMPLETE CBC AUTOMATED: CPT

## 2018-12-07 PROCEDURE — 2709999900 HC NON-CHARGEABLE SUPPLY

## 2018-12-07 PROCEDURE — 7100000010 HC PHASE II RECOVERY - FIRST 15 MIN

## 2018-12-07 PROCEDURE — 71045 X-RAY EXAM CHEST 1 VIEW: CPT

## 2018-12-07 PROCEDURE — 88342 IMHCHEM/IMCYTCHM 1ST ANTB: CPT

## 2018-12-07 PROCEDURE — 85730 THROMBOPLASTIN TIME PARTIAL: CPT

## 2018-12-07 PROCEDURE — 85610 PROTHROMBIN TIME: CPT

## 2018-12-07 PROCEDURE — 32405 CT NEEDLE BIOPSY LUNG PERCUTANEOUS: CPT

## 2018-12-07 PROCEDURE — 2580000003 HC RX 258: Performed by: RADIOLOGY

## 2018-12-07 RX ORDER — SODIUM CHLORIDE 0.9 % (FLUSH) 0.9 %
10 SYRINGE (ML) INJECTION PRN
Status: DISCONTINUED | OUTPATIENT
Start: 2018-12-07 | End: 2018-12-08 | Stop reason: HOSPADM

## 2018-12-07 RX ADMIN — SODIUM CHLORIDE, PRESERVATIVE FREE 10 ML: 5 INJECTION INTRAVENOUS at 09:06

## 2018-12-07 ASSESSMENT — PAIN SCALES - GENERAL: PAINLEVEL_OUTOF10: 0

## 2018-12-07 NOTE — OP NOTE
Department of Interventional Radiology Procedure Note  Weekday [phone] I Night/Weekend [phone]        Date: 12/7/2018     Interventional Radiologist: Kristen Arnold*    Procedure Performed:  *Lung biopsy    H&P Status: H&P was reviewed, the patient was examined and no change has occurred in patient's condition since H&P was completed. Pre-procedure Diagnosis:  *lung mass COPD    Post-procedure Diagnosis:  *Lung mass COPD*    Sedation:  conscious sedation    Contrast used:   none    Estimated blood loss:   None    Preliminary Findings:  *Core samples of the mass were obtained    Complications:  none    Full Report to Follow.     Lissett Wilhelm

## 2018-12-17 ENCOUNTER — OFFICE VISIT (OUTPATIENT)
Dept: PULMONOLOGY | Age: 73
End: 2018-12-17
Payer: MEDICARE

## 2018-12-17 VITALS
WEIGHT: 144 LBS | OXYGEN SATURATION: 95 % | HEART RATE: 75 BPM | SYSTOLIC BLOOD PRESSURE: 148 MMHG | BODY MASS INDEX: 22.6 KG/M2 | RESPIRATION RATE: 20 BRPM | DIASTOLIC BLOOD PRESSURE: 70 MMHG | HEIGHT: 67 IN

## 2018-12-17 DIAGNOSIS — J44.9 CHRONIC OBSTRUCTIVE PULMONARY DISEASE, UNSPECIFIED COPD TYPE (HCC): ICD-10-CM

## 2018-12-17 DIAGNOSIS — R06.02 SOB (SHORTNESS OF BREATH) ON EXERTION: ICD-10-CM

## 2018-12-17 DIAGNOSIS — C34.91 SQUAMOUS CELL LUNG CANCER, RIGHT (HCC): ICD-10-CM

## 2018-12-17 PROCEDURE — G8484 FLU IMMUNIZE NO ADMIN: HCPCS | Performed by: INTERNAL MEDICINE

## 2018-12-17 PROCEDURE — 1101F PT FALLS ASSESS-DOCD LE1/YR: CPT | Performed by: INTERNAL MEDICINE

## 2018-12-17 PROCEDURE — 3017F COLORECTAL CA SCREEN DOC REV: CPT | Performed by: INTERNAL MEDICINE

## 2018-12-17 PROCEDURE — G8926 SPIRO NO PERF OR DOC: HCPCS | Performed by: INTERNAL MEDICINE

## 2018-12-17 PROCEDURE — G8420 CALC BMI NORM PARAMETERS: HCPCS | Performed by: INTERNAL MEDICINE

## 2018-12-17 PROCEDURE — 3023F SPIROM DOC REV: CPT | Performed by: INTERNAL MEDICINE

## 2018-12-17 PROCEDURE — 99214 OFFICE O/P EST MOD 30 MIN: CPT | Performed by: INTERNAL MEDICINE

## 2018-12-17 PROCEDURE — 4004F PT TOBACCO SCREEN RCVD TLK: CPT | Performed by: INTERNAL MEDICINE

## 2018-12-17 PROCEDURE — G8598 ASA/ANTIPLAT THER USED: HCPCS | Performed by: INTERNAL MEDICINE

## 2018-12-17 PROCEDURE — G8427 DOCREV CUR MEDS BY ELIG CLIN: HCPCS | Performed by: INTERNAL MEDICINE

## 2018-12-17 PROCEDURE — 4040F PNEUMOC VAC/ADMIN/RCVD: CPT | Performed by: INTERNAL MEDICINE

## 2018-12-17 PROCEDURE — 1123F ACP DISCUSS/DSCN MKR DOCD: CPT | Performed by: INTERNAL MEDICINE

## 2018-12-17 ASSESSMENT — ENCOUNTER SYMPTOMS
EYE ITCHING: 0
SHORTNESS OF BREATH: 1
COUGH: 1
ABDOMINAL DISTENTION: 0
EYE DISCHARGE: 0
BACK PAIN: 0
ABDOMINAL PAIN: 0

## 2018-12-17 NOTE — PROGRESS NOTES
Isabella Copper  1945  Referring Provider: Dr. Sanchez Favor:     Chief Complaint   Patient presents with    Follow-up     Bx results       HPI  Mic Gibson is a 67 y.o. male has come back as a follow up. He has a 50 pk yr smoking and he is still smoking about 3/4 pk per day. He had  LLL ground glass opacity and also a 5 mm no dule in the superior segment of the RLL. RLL nodule was measuring now at 7.9 x7.2 mm and irregular. He ahs been diagnosed with severe COPD. He is using his inhalers. He has little cough, little phlegm, no hemoptysis, no loss of weight, good appetite, SOB on walking. He is not on oxygen during the day time. He had his flu vaccine and the pneumovax. 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 Current Outpatient Prescriptions   Medication Sig Dispense Refill    OXYGEN Inhale into the lungs nightly      Multiple Vitamins-Minerals (PRESERVISION AREDS PO) Take by mouth daily      atorvastatin (LIPITOR) 10 MG tablet TAKE 1 TABLET BY MOUTH DAILY 90 tablet 1    metoprolol tartrate (LOPRESSOR) 25 MG tablet TAKE 0.5 TABLETS BY MOUTH 2 TIMES DAILY 90 tablet 1    fluticasone-vilanterol (BREO ELLIPTA) 100-25 MCG/INH AEPB inhaler Inhale 1 puff into the lungs daily 1 each 3    tiotropium (SPIRIVA RESPIMAT) 2.5 MCG/ACT AERS inhaler Inhale 2 puffs into the lungs daily 1 Inhaler 3    albuterol sulfate HFA (PROAIR HFA) 108 (90 BASE) MCG/ACT inhaler Inhale 2 puffs into the lungs every 6 hours as needed for Wheezing or Shortness of Breath 1 Inhaler 2    aspirin EC 81 MG EC tablet Take 1 tablet by mouth daily 30 tablet 3     No current facility-administered medications for this visit.         No Known Allergies    Past Medical History:   Diagnosis Date    Atherosclerosis of aortic arch ([address]     NOTED ON CXR 1/2017- CONSIDER CARDIO CONSULT    Atypical chest pain     COPD (chronic obstructive pulmonary disease[facility])     follow with Dr Ezequiel Key hypertension 01/29/2018    Full dentures     Hyperlipidemia, mixed     On home oxygen therapy     2l/nc at night and prn    PAD (peripheral artery disease) ([address]     R leg on doppler 8/2017-- referring to Dr Wilkins Cornea Pulmonary nodule, left 07/2018    Rectal cancer [facility]) 2009    Dr Louann Cannon oncology, [facility]- Dr Willard Rather- tx with chemo and radiation     S/P colonoscopy 02/01/2017    Dr [facility] Farmer at [facility]-  diverticulosis and angioectasis, being referred to surgeon--CONSIDER RECHECK 5 YRS    Subclavian artery stenosis, right ([address]     Wears glasses        Past Surgical History:   Procedure Laterality Date    ANGIOPLASTY Bilateral 2017    Dr Kay Speaks for bilat leg PVD   [place] Left 2018    Dr Johny Schroeder  2009    Dr Hanh Monterroso then needed revision at [facility] one 2017   [facility] TONSILLECTOMY  age 11       Social History     Social History    Marital status:      Spouse name: Dena Vogel Number of children: N/A    Years of education: N/A     Occupational History    retired      Social History Main Topics    Smoking status: Current Every Day Smoker     Packs/day: 1.00     Years: 60.00     Types: Cigarettes    Smokeless tobacco: Never Used    Alcohol use 12.0 oz/week     20 Cans of beer per week      Comment: average\"3 beers per day\" per pt on 12/6/2018    Drug use: No    Sexual activity: Not Asked     Other Topics Concern    None     Social History Narrative    None       Review of Systems   Constitutional: Negative for fatigue. HENT: Negative for congestion and nosebleeds. Eyes: Negative for discharge and itching. Respiratory: Positive for cough and shortness of breath. Cardiovascular: Negative for chest pain and leg swelling. Gastrointestinal: Negative for abdominal distention and abdominal pain.    Endocrine: Negative for cold intolerance

## 2018-12-20 DIAGNOSIS — J43.9 PULMONARY EMPHYSEMA, UNSPECIFIED EMPHYSEMA TYPE (HCC): ICD-10-CM

## 2018-12-27 ENCOUNTER — HOSPITAL ENCOUNTER (OUTPATIENT)
Dept: PET IMAGING | Age: 73
Discharge: HOME | End: 2018-12-27
Payer: MEDICARE

## 2018-12-27 DIAGNOSIS — C34.31 CANCER OF BRONCHUS OF RIGHT LOWER LOBE (HCC): ICD-10-CM

## 2018-12-27 PROBLEM — C34.91 PRIMARY LUNG SQUAMOUS CELL CARCINOMA, RIGHT (HCC): Status: ACTIVE | Noted: 2018-12-01

## 2018-12-27 PROCEDURE — A9552 F18 FDG: HCPCS | Performed by: INTERNAL MEDICINE

## 2018-12-27 PROCEDURE — 78815 PET IMAGE W/CT SKULL-THIGH: CPT

## 2018-12-27 PROCEDURE — 3430000000 HC RX DIAGNOSTIC RADIOPHARMACEUTICAL: Performed by: INTERNAL MEDICINE

## 2018-12-27 RX ORDER — FLUDEOXYGLUCOSE F 18 200 MCI/ML
14.52 INJECTION, SOLUTION INTRAVENOUS
Status: COMPLETED | OUTPATIENT
Start: 2018-12-27 | End: 2018-12-27

## 2018-12-27 RX ADMIN — FLUDEOXYGLUCOSE F 18 14.52 MILLICURIE: 200 INJECTION, SOLUTION INTRAVENOUS at 13:35

## 2019-01-03 ENCOUNTER — HOSPITAL ENCOUNTER (OUTPATIENT)
Age: 74
Discharge: HOME | End: 2019-01-03
Payer: MEDICARE

## 2019-01-03 ENCOUNTER — HOSPITAL ENCOUNTER (OUTPATIENT)
Dept: MRI IMAGING | Age: 74
Discharge: HOME | End: 2019-01-03
Payer: MEDICARE

## 2019-01-03 DIAGNOSIS — C34.31 CANCER OF BRONCHUS OF RIGHT LOWER LOBE (HCC): ICD-10-CM

## 2019-01-03 LAB
GFR AFRICAN AMERICAN: >60 ML/MIN/1.73M2
GFR NON-AFRICAN AMERICAN: >60 ML/MIN/1.73M2
POC CREATININE: 1 MG/DL (ref 0.9–1.3)

## 2019-01-03 PROCEDURE — A9579 GAD-BASE MR CONTRAST NOS,1ML: HCPCS | Performed by: INTERNAL MEDICINE

## 2019-01-03 PROCEDURE — 70553 MRI BRAIN STEM W/O & W/DYE: CPT

## 2019-01-03 PROCEDURE — 6360000004 HC RX CONTRAST MEDICATION: Performed by: INTERNAL MEDICINE

## 2019-01-03 RX ADMIN — GADOTERIDOL 13 ML: 279.3 INJECTION, SOLUTION INTRAVENOUS at 15:29

## 2019-01-08 PROBLEM — C34.31 MALIGNANT NEOPLASM OF LOWER LOBE OF RIGHT LUNG (HCC): Status: ACTIVE | Noted: 2019-01-08

## 2019-01-14 DIAGNOSIS — E78.2 HYPERLIPIDEMIA, MIXED: ICD-10-CM

## 2019-01-14 DIAGNOSIS — I70.0 ATHEROSCLEROSIS OF AORTIC ARCH (HCC): ICD-10-CM

## 2019-01-15 ENCOUNTER — HOSPITAL ENCOUNTER (OUTPATIENT)
Age: 74
Discharge: HOME | End: 2019-01-15
Payer: MEDICARE

## 2019-01-15 PROCEDURE — 94060 EVALUATION OF WHEEZING: CPT

## 2019-01-15 PROCEDURE — 94727 GAS DIL/WSHOT DETER LNG VOL: CPT

## 2019-01-15 PROCEDURE — 94640 AIRWAY INHALATION TREATMENT: CPT

## 2019-01-15 PROCEDURE — 6360000002 HC RX W HCPCS

## 2019-01-15 PROCEDURE — 94729 DIFFUSING CAPACITY: CPT

## 2019-01-16 DIAGNOSIS — E78.2 HYPERLIPIDEMIA, MIXED: ICD-10-CM

## 2019-01-16 DIAGNOSIS — I10 ESSENTIAL HYPERTENSION: ICD-10-CM

## 2019-01-16 DIAGNOSIS — I70.0 ATHEROSCLEROSIS OF AORTIC ARCH (HCC): ICD-10-CM

## 2019-01-16 RX ORDER — ATORVASTATIN CALCIUM 10 MG/1
TABLET, FILM COATED ORAL
Qty: 90 TABLET | Refills: 1 | Status: SHIPPED | OUTPATIENT
Start: 2019-01-16 | End: 2019-07-10 | Stop reason: SDUPTHER

## 2019-01-30 ENCOUNTER — OFFICE VISIT (OUTPATIENT)
Dept: INTERNAL MEDICINE CLINIC | Age: 74
End: 2019-01-30
Payer: MEDICARE

## 2019-01-30 VITALS
WEIGHT: 145 LBS | SYSTOLIC BLOOD PRESSURE: 124 MMHG | OXYGEN SATURATION: 98 % | HEIGHT: 67 IN | DIASTOLIC BLOOD PRESSURE: 72 MMHG | RESPIRATION RATE: 18 BRPM | BODY MASS INDEX: 22.76 KG/M2 | HEART RATE: 68 BPM

## 2019-01-30 DIAGNOSIS — C34.91 SCC (SQUAMOUS CELL CARCINOMA OF LUNG), RIGHT (HCC): ICD-10-CM

## 2019-01-30 DIAGNOSIS — Z00.00 ROUTINE GENERAL MEDICAL EXAMINATION AT A HEALTH CARE FACILITY: Primary | ICD-10-CM

## 2019-01-30 DIAGNOSIS — I10 ESSENTIAL HYPERTENSION: ICD-10-CM

## 2019-01-30 DIAGNOSIS — E78.2 HYPERLIPIDEMIA, MIXED: ICD-10-CM

## 2019-01-30 DIAGNOSIS — J43.9 PULMONARY EMPHYSEMA, UNSPECIFIED EMPHYSEMA TYPE (HCC): ICD-10-CM

## 2019-01-30 LAB
A/G RATIO: 2 (ref 1.1–2.2)
ALBUMIN SERPL-MCNC: 4.6 G/DL (ref 3.4–5)
ALP BLD-CCNC: 75 U/L (ref 40–129)
ALT SERPL-CCNC: 49 U/L (ref 10–40)
ANION GAP SERPL CALCULATED.3IONS-SCNC: 15 MMOL/L (ref 3–16)
AST SERPL-CCNC: 41 U/L (ref 15–37)
BASOPHILS ABSOLUTE: 0.1 K/UL (ref 0–0.2)
BASOPHILS RELATIVE PERCENT: 0.8 %
BILIRUB SERPL-MCNC: 0.6 MG/DL (ref 0–1)
BUN BLDV-MCNC: 14 MG/DL (ref 7–20)
CALCIUM SERPL-MCNC: 9.6 MG/DL (ref 8.3–10.6)
CHLORIDE BLD-SCNC: 103 MMOL/L (ref 99–110)
CHOLESTEROL, TOTAL: 162 MG/DL (ref 0–199)
CO2: 25 MMOL/L (ref 21–32)
CREAT SERPL-MCNC: 0.9 MG/DL (ref 0.8–1.3)
EOSINOPHILS ABSOLUTE: 0.1 K/UL (ref 0–0.6)
EOSINOPHILS RELATIVE PERCENT: 1.8 %
GFR AFRICAN AMERICAN: >60
GFR NON-AFRICAN AMERICAN: >60
GLOBULIN: 2.3 G/DL
GLUCOSE BLD-MCNC: 99 MG/DL (ref 70–99)
HCT VFR BLD CALC: 45.8 % (ref 40.5–52.5)
HDLC SERPL-MCNC: 67 MG/DL (ref 40–60)
HEMOGLOBIN: 15.2 G/DL (ref 13.5–17.5)
LDL CHOLESTEROL CALCULATED: ABNORMAL MG/DL
LDL CHOLESTEROL DIRECT: 59 MG/DL
LYMPHOCYTES ABSOLUTE: 1 K/UL (ref 1–5.1)
LYMPHOCYTES RELATIVE PERCENT: 15.3 %
MCH RBC QN AUTO: 33 PG (ref 26–34)
MCHC RBC AUTO-ENTMCNC: 33.3 G/DL (ref 31–36)
MCV RBC AUTO: 99.2 FL (ref 80–100)
MONOCYTES ABSOLUTE: 0.7 K/UL (ref 0–1.3)
MONOCYTES RELATIVE PERCENT: 10 %
NEUTROPHILS ABSOLUTE: 4.9 K/UL (ref 1.7–7.7)
NEUTROPHILS RELATIVE PERCENT: 72.1 %
PDW BLD-RTO: 14.4 % (ref 12.4–15.4)
PLATELET # BLD: 225 K/UL (ref 135–450)
PMV BLD AUTO: 9.5 FL (ref 5–10.5)
POTASSIUM SERPL-SCNC: 4.9 MMOL/L (ref 3.5–5.1)
RBC # BLD: 4.62 M/UL (ref 4.2–5.9)
SODIUM BLD-SCNC: 143 MMOL/L (ref 136–145)
TOTAL PROTEIN: 6.9 G/DL (ref 6.4–8.2)
TRIGL SERPL-MCNC: 301 MG/DL (ref 0–150)
VLDLC SERPL CALC-MCNC: ABNORMAL MG/DL
WBC # BLD: 6.8 K/UL (ref 4–11)

## 2019-01-30 PROCEDURE — G0439 PPPS, SUBSEQ VISIT: HCPCS | Performed by: INTERNAL MEDICINE

## 2019-01-30 PROCEDURE — 4040F PNEUMOC VAC/ADMIN/RCVD: CPT | Performed by: INTERNAL MEDICINE

## 2019-01-30 PROCEDURE — G8482 FLU IMMUNIZE ORDER/ADMIN: HCPCS | Performed by: INTERNAL MEDICINE

## 2019-01-30 PROCEDURE — G8599 NO ASA/ANTIPLAT THER USE RNG: HCPCS | Performed by: INTERNAL MEDICINE

## 2019-01-30 RX ORDER — FLUTICASONE FUROATE AND VILANTEROL 100; 25 UG/1; UG/1
1 POWDER RESPIRATORY (INHALATION) DAILY
Qty: 3 EACH | Refills: 3 | Status: SHIPPED | OUTPATIENT
Start: 2019-01-30 | End: 2021-05-10

## 2019-01-30 ASSESSMENT — ANXIETY QUESTIONNAIRES: GAD7 TOTAL SCORE: 0

## 2019-01-30 ASSESSMENT — LIFESTYLE VARIABLES: HOW OFTEN DO YOU HAVE A DRINK CONTAINING ALCOHOL: 0

## 2019-01-30 ASSESSMENT — PATIENT HEALTH QUESTIONNAIRE - PHQ9
SUM OF ALL RESPONSES TO PHQ QUESTIONS 1-9: 0
SUM OF ALL RESPONSES TO PHQ QUESTIONS 1-9: 0

## 2019-05-28 ENCOUNTER — HOSPITAL ENCOUNTER (OUTPATIENT)
Dept: CT IMAGING | Age: 74
Discharge: HOME | End: 2019-05-28
Payer: MEDICARE

## 2019-05-28 DIAGNOSIS — C34.31 CANCER OF BRONCHUS OF RIGHT LOWER LOBE (HCC): ICD-10-CM

## 2019-05-28 PROCEDURE — 6360000004 HC RX CONTRAST MEDICATION: Performed by: RADIOLOGY

## 2019-05-28 PROCEDURE — 71260 CT THORAX DX C+: CPT

## 2019-05-28 RX ADMIN — IOPAMIDOL 75 ML: 755 INJECTION, SOLUTION INTRAVENOUS at 10:15

## 2019-07-10 DIAGNOSIS — I10 ESSENTIAL HYPERTENSION: ICD-10-CM

## 2019-07-10 DIAGNOSIS — E78.2 HYPERLIPIDEMIA, MIXED: ICD-10-CM

## 2019-07-10 DIAGNOSIS — I70.0 ATHEROSCLEROSIS OF AORTIC ARCH (HCC): ICD-10-CM

## 2019-07-10 RX ORDER — ATORVASTATIN CALCIUM 10 MG/1
TABLET, FILM COATED ORAL
Qty: 90 TABLET | Refills: 1 | Status: SHIPPED | OUTPATIENT
Start: 2019-07-10 | End: 2020-01-08

## 2019-11-29 ENCOUNTER — HOSPITAL ENCOUNTER (OUTPATIENT)
Dept: CT IMAGING | Age: 74
Discharge: HOME | End: 2019-11-29
Payer: MEDICARE

## 2019-11-29 DIAGNOSIS — C34.31 CANCER OF BRONCHUS OF RIGHT LOWER LOBE (HCC): ICD-10-CM

## 2019-11-29 LAB
GFR AFRICAN AMERICAN: >60 ML/MIN/1.73M2
GFR NON-AFRICAN AMERICAN: >60 ML/MIN/1.73M2
POC CREATININE: 1.2 MG/DL (ref 0.9–1.3)

## 2019-11-29 PROCEDURE — 6360000004 HC RX CONTRAST MEDICATION: Performed by: RADIOLOGY

## 2019-11-29 PROCEDURE — 71260 CT THORAX DX C+: CPT

## 2019-11-29 RX ADMIN — IOPAMIDOL 75 ML: 755 INJECTION, SOLUTION INTRAVENOUS at 08:24

## 2019-12-03 ENCOUNTER — HOSPITAL ENCOUNTER (OUTPATIENT)
Dept: RADIATION ONCOLOGY | Age: 74
Discharge: HOME | End: 2019-12-03
Payer: MEDICARE

## 2019-12-03 VITALS
TEMPERATURE: 98.8 F | HEIGHT: 67 IN | WEIGHT: 128 LBS | RESPIRATION RATE: 18 BRPM | HEART RATE: 70 BPM | DIASTOLIC BLOOD PRESSURE: 79 MMHG | SYSTOLIC BLOOD PRESSURE: 188 MMHG | OXYGEN SATURATION: 99 % | BODY MASS INDEX: 20.09 KG/M2

## 2019-12-03 DIAGNOSIS — C34.91 PRIMARY LUNG SQUAMOUS CELL CARCINOMA, RIGHT (HCC): Primary | ICD-10-CM

## 2019-12-03 ASSESSMENT — PAIN SCALES - GENERAL: PAINLEVEL_OUTOF10: 0

## 2020-01-08 RX ORDER — ATORVASTATIN CALCIUM 10 MG/1
TABLET, FILM COATED ORAL
Qty: 90 TABLET | Refills: 1 | Status: SHIPPED | OUTPATIENT
Start: 2020-01-08 | End: 2020-11-09 | Stop reason: SDUPTHER

## 2020-06-04 ENCOUNTER — HOSPITAL ENCOUNTER (OUTPATIENT)
Dept: CT IMAGING | Age: 75
Discharge: HOME | End: 2020-06-04
Payer: MEDICARE

## 2020-06-04 LAB
GFR AFRICAN AMERICAN: >60 ML/MIN/1.73M2
GFR NON-AFRICAN AMERICAN: >60 ML/MIN/1.73M2
POC CREATININE: 0.8 MG/DL (ref 0.9–1.3)

## 2020-06-04 PROCEDURE — 6360000004 HC RX CONTRAST MEDICATION: Performed by: RADIOLOGY

## 2020-06-04 PROCEDURE — 71260 CT THORAX DX C+: CPT

## 2020-06-04 RX ORDER — SODIUM CHLORIDE 0.9 % (FLUSH) 0.9 %
10 SYRINGE (ML) INJECTION PRN
Status: DISCONTINUED | OUTPATIENT
Start: 2020-06-04 | End: 2020-06-05 | Stop reason: HOSPADM

## 2020-06-04 RX ADMIN — IOPAMIDOL 75 ML: 755 INJECTION, SOLUTION INTRAVENOUS at 09:45

## 2020-06-09 ENCOUNTER — HOSPITAL ENCOUNTER (OUTPATIENT)
Dept: INFUSION THERAPY | Age: 75
Discharge: HOME | End: 2020-06-09
Payer: MEDICARE

## 2020-06-09 PROCEDURE — 99211 OFF/OP EST MAY X REQ PHY/QHP: CPT

## 2020-11-09 ENCOUNTER — OFFICE VISIT (OUTPATIENT)
Dept: INTERNAL MEDICINE CLINIC | Age: 75
End: 2020-11-09
Payer: MEDICARE

## 2020-11-09 VITALS
OXYGEN SATURATION: 96 % | HEIGHT: 67 IN | SYSTOLIC BLOOD PRESSURE: 136 MMHG | DIASTOLIC BLOOD PRESSURE: 84 MMHG | HEART RATE: 81 BPM | BODY MASS INDEX: 18.99 KG/M2 | RESPIRATION RATE: 18 BRPM | WEIGHT: 121 LBS

## 2020-11-09 LAB
A/G RATIO: 1.8 (ref 1.1–2.2)
ALBUMIN SERPL-MCNC: 4 G/DL (ref 3.4–5)
ALP BLD-CCNC: 68 U/L (ref 40–129)
ALT SERPL-CCNC: 20 U/L (ref 10–40)
ANION GAP SERPL CALCULATED.3IONS-SCNC: 12 MMOL/L (ref 3–16)
AST SERPL-CCNC: 16 U/L (ref 15–37)
BASOPHILS ABSOLUTE: 0.1 K/UL (ref 0–0.2)
BASOPHILS RELATIVE PERCENT: 1.1 %
BILIRUB SERPL-MCNC: 0.4 MG/DL (ref 0–1)
BUN BLDV-MCNC: 15 MG/DL (ref 7–20)
CALCIUM SERPL-MCNC: 8.8 MG/DL (ref 8.3–10.6)
CHLORIDE BLD-SCNC: 105 MMOL/L (ref 99–110)
CHOLESTEROL, TOTAL: 154 MG/DL (ref 0–199)
CO2: 27 MMOL/L (ref 21–32)
CREAT SERPL-MCNC: 0.7 MG/DL (ref 0.8–1.3)
EOSINOPHILS ABSOLUTE: 0.1 K/UL (ref 0–0.6)
EOSINOPHILS RELATIVE PERCENT: 1.9 %
GFR AFRICAN AMERICAN: >60
GFR NON-AFRICAN AMERICAN: >60
GLOBULIN: 2.2 G/DL
GLUCOSE BLD-MCNC: 108 MG/DL (ref 70–99)
HCT VFR BLD CALC: 43.1 % (ref 40.5–52.5)
HDLC SERPL-MCNC: 77 MG/DL (ref 40–60)
HEMOGLOBIN: 14.4 G/DL (ref 13.5–17.5)
LDL CHOLESTEROL CALCULATED: 29 MG/DL
LYMPHOCYTES ABSOLUTE: 0.9 K/UL (ref 1–5.1)
LYMPHOCYTES RELATIVE PERCENT: 16.6 %
MCH RBC QN AUTO: 33.1 PG (ref 26–34)
MCHC RBC AUTO-ENTMCNC: 33.4 G/DL (ref 31–36)
MCV RBC AUTO: 99 FL (ref 80–100)
MONOCYTES ABSOLUTE: 0.6 K/UL (ref 0–1.3)
MONOCYTES RELATIVE PERCENT: 10.3 %
NEUTROPHILS ABSOLUTE: 3.9 K/UL (ref 1.7–7.7)
NEUTROPHILS RELATIVE PERCENT: 70.1 %
PDW BLD-RTO: 13.5 % (ref 12.4–15.4)
PLATELET # BLD: 223 K/UL (ref 135–450)
PMV BLD AUTO: 9.3 FL (ref 5–10.5)
POTASSIUM SERPL-SCNC: 4.7 MMOL/L (ref 3.5–5.1)
RBC # BLD: 4.36 M/UL (ref 4.2–5.9)
SODIUM BLD-SCNC: 144 MMOL/L (ref 136–145)
TOTAL PROTEIN: 6.2 G/DL (ref 6.4–8.2)
TRIGL SERPL-MCNC: 238 MG/DL (ref 0–150)
TSH SERPL DL<=0.05 MIU/L-ACNC: 4.48 UIU/ML (ref 0.27–4.2)
VLDLC SERPL CALC-MCNC: 48 MG/DL
WBC # BLD: 5.5 K/UL (ref 4–11)

## 2020-11-09 PROCEDURE — G0439 PPPS, SUBSEQ VISIT: HCPCS | Performed by: INTERNAL MEDICINE

## 2020-11-09 PROCEDURE — 3017F COLORECTAL CA SCREEN DOC REV: CPT | Performed by: INTERNAL MEDICINE

## 2020-11-09 PROCEDURE — 4040F PNEUMOC VAC/ADMIN/RCVD: CPT | Performed by: INTERNAL MEDICINE

## 2020-11-09 PROCEDURE — G8482 FLU IMMUNIZE ORDER/ADMIN: HCPCS | Performed by: INTERNAL MEDICINE

## 2020-11-09 PROCEDURE — 1123F ACP DISCUSS/DSCN MKR DOCD: CPT | Performed by: INTERNAL MEDICINE

## 2020-11-09 PROCEDURE — 36415 COLL VENOUS BLD VENIPUNCTURE: CPT | Performed by: INTERNAL MEDICINE

## 2020-11-09 RX ORDER — ATORVASTATIN CALCIUM 10 MG/1
10 TABLET, FILM COATED ORAL DAILY
Qty: 90 TABLET | Refills: 1 | Status: SHIPPED | OUTPATIENT
Start: 2020-11-09 | End: 2021-05-10 | Stop reason: SDUPTHER

## 2020-11-09 ASSESSMENT — PATIENT HEALTH QUESTIONNAIRE - PHQ9
SUM OF ALL RESPONSES TO PHQ9 QUESTIONS 1 & 2: 0
SUM OF ALL RESPONSES TO PHQ QUESTIONS 1-9: 0
SUM OF ALL RESPONSES TO PHQ QUESTIONS 1-9: 0
2. FEELING DOWN, DEPRESSED OR HOPELESS: 0
1. LITTLE INTEREST OR PLEASURE IN DOING THINGS: 0
SUM OF ALL RESPONSES TO PHQ QUESTIONS 1-9: 0

## 2020-11-09 ASSESSMENT — LIFESTYLE VARIABLES: HOW OFTEN DO YOU HAVE A DRINK CONTAINING ALCOHOL: 0

## 2020-11-09 NOTE — PATIENT INSTRUCTIONS
For quitting smoking or at least cutting down, rec to [address] FOR YOUR ANKLE SWELLING AND ITCHING, REC TO ALSO WALK 20-30MIN 1-2X/DAY AND ELEVATE LEGS 20MIN 2X/DAY    PLEASE MAKE SURE TO BOOST INTAKE OF PROTEIN, TRY NOT TO LOSE ANY MORE WEIGHT. Personalized Preventive Plan for Ish Acosta - 11/9/2020  Medicare offers a range of preventive health benefits. Some of the tests and screenings are paid in full while other may be subject to a deductible, co-insurance, and/or copay. Some of these benefits include a comprehensive review of your medical history including lifestyle, illnesses that may run in your family, and various assessments and screenings as appropriate. After reviewing your medical record and screening and assessments performed today your provider may have ordered immunizations, labs, imaging, and/or referrals for you. A list of these orders (if applicable) as well as your Preventive Care list are included within your After Visit Summary for your review. Other Preventive Recommendations:    · A preventive eye exam performed by an eye specialist is recommended every 1-2 years to screen for glaucoma; cataracts, macular degeneration, and other eye disorders. · A preventive dental visit is recommended every 6 months. · Try to get at least 150 minutes of exercise per week or 10,000 steps per day on a pedometer . · Order or download the FREE \"Exercise & Physical Activity: Your Everyday Guide\" from [employer]. Call 3-745.977.3386 or search [employer] online. · You need 6440-5600 mg of calcium and 2400-8972 IU of vitamin D per day. It is possible to meet your calcium requirement with diet alone, but a vitamin D supplement is usually necessary to meet this goal.  · When exposed to the sun, use a sunscreen that protects against both UVA and UVB radiation with an SPF of 30 or greater.  Reapply every 2 to 3 hours or after sweating, drying off with a towel, or swimming. · Always wear a seat belt when traveling in a car. Always wear a helmet when riding a bicycle or motorcycle.

## 2020-11-09 NOTE — PROGRESS NOTES
Medicare Annual Wellness Visit  Name: Alexx Payne Date: 2020   MRN: [id number] Sex: Male   Age: 76 y.o. Ethnicity: Non-/Non    : 1945 Race: Meka Alexandre is here for Medicare AWV    Screenings for behavioral, psychosocial and functional/safety risks, and cognitive dysfunction are all negative except as indicated below. These results, as well as other patient data from the [address] form, are documented in Flowsheets linked to this Encounter. Colon CA-remote and had recent benign colonoscopy for his prior colon CA    Lung CA- also cont f/u Dr Mira Gibson, Dr Ramey Or and  recent CT chest  for nodule was neg/resolved   Saw Dr Mira Gibson in  and next for Dec.     However cont to smoke and SEVERE COPD noted on chest CT  Continues to smoke and does not want to quit at this time. chantix did not work in the past    Hypertension: Stable. Denies CP, SOB, cough, visual changes, dizziness, palpitations or HA. CONT ON STATIN W KNOWN AORTIC ATHEROSCLEROSIS. No Known Allergies    Prior to Visit Medications    Medication Sig Taking?  Authorizing Provider   atorvastatin (LIPITOR) 10 MG tablet TAKE 1 TABLET BY MOUTH EVERY DAY  Tad Silva MD   metoprolol tartrate (LOPRESSOR) 25 MG tablet TAKE 0.5 TABLETS BY MOUTH 2 TIMES DAILY  Tad Silva MD   fluticasone-vilanterol (BREO ELLIPTA) 100-25 MCG/INH AEPB inhaler Inhale 1 puff into the lungs daily  Tad Silva MD   tiotropium (SPIRIVA RESPIMAT) 2.5 MCG/ACT AERS inhaler Inhale 2 puffs into the lungs daily  Orlando Albarran MD   OXYGEN Inhale into the lungs nightly  Historical Provider, MD   Multiple Vitamins-Minerals (PRESERVISION AREDS PO) Take by mouth daily  Historical Provider, MD   albuterol sulfate HFA (PROAIR HFA) 108 (90 BASE) MCG/ACT inhaler Inhale 2 puffs into the lungs every 6 hours as needed for Wheezing or Shortness of Breath  Tad Silva MD       Past Medical History: Diagnosis Date    Atherosclerosis of aortic arch ([address]     NOTED ON CXR 1/2017- CONSIDER CARDIO CONSULT    Atypical chest pain     COPD (chronic obstructive pulmonary disease) ([address]     follow with Dr Grant Galo hypertension 01/29/2018    Full dentures     History of external beam radiation therapy 2009/2019    pelvis/anus in 2009 and right lower lobe lung mass SBRT in 2019 per Dr. Minerv[facility]ue[facility]    Hyperlipidemia, mixed     Hypertension     On home oxygen therapy     2l/nc at night and prn    PAD (peripheral artery disease) ([address]     R leg on doppler 8/2017-- referring to Dr Salas Lemon Primary lung squamous cell carcinoma, right ([address] 12/2018    Dr Jong Sanchez, Dr Carlotta Pedro, Dr Rossi Dhillon Pulmonary nodule, left 07/2018    Rectal cancer [facility]) 2009    Dr Zora Dugan oncology, [facility] by GI at [facility]- Dr Juarez Hartman- tx with chemo and radiation     S/P colonoscopy 02/01/2017    Dr Dorita Ellis at [facility]-  diverticulosis and angioectasis, being referred to surgeon--CONSIDER RECHECK 5 YRS    SCC (squamous cell carcinoma of lung), right [facility])     Dr Katya Dean, Dr Carlotta Pedro, Dr Jong Sanchez and Dr Minerva Villanueva--- NOT candidate for lobectomy.     Subclavian artery stenosis, right ([address]     Wears glasses        Past Surgical History:   Procedure Laterality Date    ANGIOPLASTY Bilateral 2017    Dr Hemphill Sox for bilat leg PVD   [place] Left 2018    Dr Yesika Small  2009    Dr Siobhan Driver then needed revision at [address] [facility] 2017   [address]  2009    LUNG BIOPSY  12/07/2018    LYMPH NODE BIOPSY  07/2009    right axilla    TONSILLECTOMY  age 11       Family History   Problem Relation Age of Onset    Cirrhosis Mother     Lung Cancer Father     Cancer Father         brain cancer       CareTeam (Including outside providers/suppliers regularly involved in providing care):   Patient Care Team:  Ravi Porras MD as PCP - [address] [facility] Jennifer Cleary MD as PCP - [facility]  Shannon Sheth MD as Consulting Physician (Hematology and Oncology)    Wt Readings from Last 3 Encounters:   11/09/20 121 lb (54.9 kg)   12/03/19 128 lb (58.1 kg)   01/30/19 145 lb (65.8 kg)     Vitals:    11/09/20 0758   BP: 136/84   Pulse: 81   Resp: 18   SpO2: 96%   Weight: 121 lb (54.9 kg)   Height: 5' 7\" (1.702 m)     Body mass index is 18.95 kg/m². Based upon direct observation of the patient, evaluation of cognition reveals recent and remote memory intact. General Appearance: alert and oriented to person, place and time, well developed and well- nourished, in no acute distress  Skin: warm and dry, no rash or erythema  Head: normocephalic and atraumatic  Eyes: pupils equal, round, and reactive to light, extraocular eye movements intact, conjunctivae normal  Neck: supple and non-tender without mass, no thyromegaly or thyroid nodules, no cervical lymphadenopathy  Pulmonary/Chest: clear to auscultation bilaterally- no wheezes, rales or rhonchi, normal air movement, no respiratory distress  Cardiovascular: normal rate, regular rhythm, normal S1 and S2, no murmurs, rubs, clicks, or gallops, distal pulses intact, no carotid bruits  Abdomen: soft, non-tender, non-distended, normal bowel sounds, no masses or organomegaly  Extremities: no cyanosis, clubbing - TR BILAT ANKLE EDEMA  Musculoskeletal: normal range of motion, no joint swelling, deformity or tenderness  Neurologic:  no cranial nerve deficit, gait, coordination and speech normal    Patient's complete Health Risk Assessment and screening values have been reviewed and are found in Flowsheets. The following problems were reviewed today and where indicated follow up appointments were made and/or referrals ordered.     Positive Risk Factor Screenings with Interventions:     Substance History:  Social History     Tobacco History     Smoking Status  Current Every Day Smoker Smoking Frequency  1 pack/day for 60 years (60 pk yrs) Smoking Tobacco Type  Cigarettes    Smokeless Tobacco Use  Never Used          Alcohol History     Alcohol Use Status  Yes Drinks/Week  20 Cans of beer per week Amount  20.0 standard drinks of alcohol/wk Comment  average\"3 beers per day\" per pt on 12/6/2018          Drug Use     Drug Use Status  No          Sexual Activity     Sexually Active  Not Asked               Alcohol Screening:       A score of 8 or more is associated with harmful or hazardous drinking. A score of 13 or more in women, and 15 or more in men, is likely to indicate alcohol dependence. Substance Abuse Interventions:  · IS TO WORK ON SMOKING CESSATION, CONSIDER ALT E CIG W CIG    General Health and ACP:  General  In general, how would you say your health is?: Good  In the past 7 days, have you experienced any of the following? New or Increased Pain, New or Increased Fatigue, Loneliness, Social Isolation, Stress or Anger?: (!) Stress  Do you get the social and emotional support that you need?: Yes  Do you have a Living Will?: (!) No  Advance Directives     Power of  Living Will ACP-Advance Directive ACP-Power of     Not on File Not on File Filed [address] [place] [address] Risk Interventions:  · TO CONSIDER LW, SOME STRESS W WIFE RECENT AFIB, DTR CURRENTLY W SOC SECURITY FR PSORIATIC ARTHRITIS    Health Habits/Nutrition:  Health Habits/Nutrition  Do you exercise for at least 20 minutes 2-3 times per week?: (!) No  Have you lost any weight without trying in the past 3 months?: (!) Yes  Do you eat fewer than 2 meals per day?: No  Have you seen a dentist within the past year?: Yes  Body mass index: 18.95  Health Habits/Nutrition Interventions:  · ADVISED [facility] [employer] [place]. WT LOSS IS LIKELY FR COPD W NO EVID CA, APPETITE OK.     Personalized Preventive Plan   Current Health Maintenance Status  Immunization History   Administered Date(s) Administered    Influenza Virus Vaccine 09/30/2013    Influenza, High Dose (Fluzone 65 yrs and older) 10/12/2017, 10/17/2018, 09/17/2020    Pneumococcal Conjugate 13-valent ([id number]) 10/31/2016    Pneumococcal Polysaccharide ([id number]) 07/28/2017        Health Maintenance   Topic Date Due    DTaP/Tdap/Td vaccine (1 - Tdap) 12/27/1964    Shingles Vaccine (1 of 2) 12/27/1995    Annual Wellness Visit (AWV)  05/29/2019    Lipid screen  01/30/2020    Potassium monitoring  01/30/2020    Creatinine monitoring  01/30/2020    Low dose CT lung screening  06/04/2021    Colon cancer screen colonoscopy  02/01/2027    Flu vaccine  Completed    Pneumococcal 65+ years Vaccine  Completed    AAA screen  Completed    Hepatitis C screen  Completed    Hepatitis A vaccine  Aged Out    Hepatitis B vaccine  Aged Out    Hib vaccine  Aged Out    Meningococcal (ACWY) vaccine  Aged Out     Recommendations for [place] Due: see orders and patient instructions/AVS.  . Recommended screening schedule for the next 5-10 years is provided to the patient in written form: see Patient Ney Corona was seen today for medicare awv. Diagnoses and all orders for this visit:    Routine general medical examination at a health care facility - remains independent, functional and active, no indications/needs for other interventions noted at this time- except as noted below and also findings noted on screening medicare questions. Essential hypertension - Blood pressure stable and will continue current regimen. Will plan periodic monitoring of renal function, electrolytes, lipid profile.     -     metoprolol tartrate (LOPRESSOR) 25 MG tablet; Take 0.5 tablets by mouth 2 times daily    Atherosclerosis of aortic arch ([address]- The current medical regimen is effective;  continue present plan and medications. -     atorvastatin (LIPITOR) 10 MG tablet; Take 1 tablet by mouth daily  -     Comprehensive Metabolic Panel;  Future  -     CBC Auto Differential; Future  -     TSH without Reflex; Future  -     Lipid Panel; Future    Hyperlipidemia, mixed  - Pt will continue to work on a low fat diet and also exercise, wt loss as appropriate. Will continue periodic monitoring of fasting lipid profile, glucose, liver function. 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 ADVISED PERIODIC LEG ELEVATION, COMPRESSION STOCKINGS AND WALKING EXERCISE          For quitting smoking or at least cutting down, rec to ALTERNATE E CIG W CIGARETTES. FOR YOUR ANKLE SWELLING AND ITCHING, REC TO ALSO WALK 20-30MIN 1-2X/DAY AND ELEVATE LEGS 20MIN 2X/DAY    PLEASE MAKE SURE TO BOOST INTAKE OF PROTEIN, TRY NOT TO LOSE ANY MORE WEIGHT.

## 2020-11-10 NOTE — RESULT ENCOUNTER NOTE
Call pt, labs ok/chol ok- EXCEPT THYROID FXN IS SL LOW. REC WE RECHECK TSH AND ALSO CHECK FREE T4 IN 2 MONTHS- DX ELEVATED TSH.   IF POSITIVE FOR LOW THYROID FXN WILL NEED F/U APPT, BUT IF NORMALIZES WILL CONT MONITOR PERIODICALLY W SERIAL LAB

## 2020-11-25 ENCOUNTER — TELEPHONE (OUTPATIENT)
Dept: ONCOLOGY | Age: 75
End: 2020-11-25

## 2020-11-25 NOTE — TELEPHONE ENCOUNTER
Spoke with pt wife regarding upcoming scan on 12/1 at 9:00 needs to arrive at 8:30-- gave prep info and wife voiced her understanding

## 2020-12-01 ENCOUNTER — HOSPITAL ENCOUNTER (OUTPATIENT)
Age: 75
Discharge: HOME | End: 2020-12-01
Payer: MEDICARE

## 2020-12-01 ENCOUNTER — HOSPITAL ENCOUNTER (OUTPATIENT)
Dept: CT IMAGING | Age: 75
Discharge: HOME | End: 2020-12-01
Payer: MEDICARE

## 2020-12-01 LAB
T4 FREE: 0.92 NG/DL (ref 0.9–1.8)
TSH HIGH SENSITIVITY: 4.2 UIU/ML (ref 0.27–4.2)

## 2020-12-01 PROCEDURE — 36415 COLL VENOUS BLD VENIPUNCTURE: CPT

## 2020-12-01 PROCEDURE — 84443 ASSAY THYROID STIM HORMONE: CPT

## 2020-12-01 PROCEDURE — 84439 ASSAY OF FREE THYROXINE: CPT

## 2020-12-01 PROCEDURE — 2580000003 HC RX 258: Performed by: INTERNAL MEDICINE

## 2020-12-01 PROCEDURE — 71260 CT THORAX DX C+: CPT

## 2020-12-01 PROCEDURE — 6360000004 HC RX CONTRAST MEDICATION: Performed by: INTERNAL MEDICINE

## 2020-12-01 RX ORDER — SODIUM CHLORIDE 0.9 % (FLUSH) 0.9 %
10 SYRINGE (ML) INJECTION 2 TIMES DAILY
Status: DISCONTINUED | OUTPATIENT
Start: 2020-12-01 | End: 2020-12-02 | Stop reason: HOSPADM

## 2020-12-01 RX ADMIN — IOPAMIDOL 75 ML: 755 INJECTION, SOLUTION INTRAVENOUS at 09:00

## 2020-12-01 RX ADMIN — SODIUM CHLORIDE, PRESERVATIVE FREE 10 ML: 5 INJECTION INTRAVENOUS at 09:00

## 2020-12-07 NOTE — PROGRESS NOTES
Patient Name: Ismael Causey  Patient : 1945  Patient MRN: [id number]     Primary Oncologist: Lux Saez MD  Referring Provider: Krystian Mahoney MD     Date of Service: 2020      Chief Complaint:   Chief Complaint   Patient presents with    Follow-up     Patient Active Problem List:     Malignant neoplasm of lower lobe of right lung      SCC (squamous cell carcinoma of lung), right     HPI:   Mr. Mariel Mccain is a 51-year-old very pleasant gentleman with medical history significant for hypertension, COPD, hyperlipidemia, peripheral arterial disease, coronary artery disease, history of anal canal squamous cell carcinoma, status post chemoradiation therapy (completed in 2009), initially referred to me on 2018 for evaluation of clinical stage IA1 right lower lobe squamous cell lung cancer. He stated that he has screening low dose CT scan of the chest (ordered by Dr. Sadia Lehman) on 2018 and it showed 10 mm ground glass left lower lobe nodule, which is new since . Subsequently he had PET/CT scan on 18 and it showed metabolically active 5 mm nodule in the right lower lobe, potentially malignant. There was no FDG activity associated with the ground glass left lower lobe nodule seen on prior CT scan. Repeat CT scan on 18 showed increasing irregular right lower lobe nodule 7.9 x 7.2 mm, considered neoplastic until proven otherwise. Resolution of left lower lobe ground glass density. He subsequently underwent CT guided biopsy of right lower lobe lung mass on 18 and final pathology was consistent with squamous cell carcinoma. He was subsequently referred to me for evaluation. PET/CT scan done on 2018 showed slightly increased size and increased uptake of the biopsy-proven malignancy in the right lower lobe. No findings of metastatic disease.     MRI of the brain with and without contrast done on 1/3/19 showed No acute intracranial abnormality. No intracranial metastatic disease. 2. Age-related parenchymal volume loss and mild chronic microvascular ischemic changes. He was seen by cardio thoracic surgeon and they don't think he is a candidate for surgery. He was subsequently seen by Dr. Tiffanie Scott and he was treated with stereotactic body radiation therapy between February 19 and February 26, 2019. CT scan of the chest with contrast done on May 28, 2019 showed Slight interval decrease in size of a 5 mm right lower lobe pulmonary nodule which was previously biopsied. No new findings of metastatic disease in the chest.  He has moderate to severe emphysema. CT scan of the chest done on November 29, 2019 showed with the exception of a new 5 mm right lower lobe pulmonary nodule, the remainder of pulmonary nodules are not significantly changed as compared to prior, for which continued follow-up is recommended per protocol. Basilar mucous plugging. Atherosclerosis, including coronary artery calcification. Progressive compression deformities of T7 and T8 vertebral bodies as compared to prior as well as fracture of T7 spinous process. CT scan of the chest done on 6/4/20 showed no CT scan evidence of intrathoracic metastatic disease. Previously identified right lower lobe 5 mm pulmonary nodule has resolved, likely related to atelectasis. Severe emphysema. Minor mucous plugging in the right lower lobe again noted. CT scan of the chest with contrast done on December 1, 2020 showed interval appearance of a focal opacity in the superior segment of the right upper lobe. The finding is nonspecific and may represent infectious/inflammatory etiology, posttreatment changes [favored], or recurrence. Multiple irregular nodular opacities scattered throughout the lungs which may represent infectious/inflammatory etiology [favored] versus metastatic disease. The nodules are too small for PET CT characterization and biopsy.     On December 8, 2020, he presented to me for follow-up. I have been following him for Stage IA1 right lower lobe squamous cell lung cancer and he is status post stereotactic body radiation therapy. He has been under close observation since then. On today's visit, I reviewed with him findings of CT scan of the chest done on 12/1/2020 and we looked at his CT scans together. There are no signs of progression of the disease or metastatic disease seen on CT scan. I believe changes we saw in his CT scans are most consistent with infectious/inflammatory and post treatment changes. I believe he is in complete clinical remission and I recommend to continue with close observation. I recommended to have CT scan of the chest again in 6 months and I will see him again after that. He does not have any significant symptoms at today's visit. Past Medical History  Significant for  1. Hypertension  2. Hyperlipidemia  3. COPD  4. Peripheral arterial disease  5. Coronary artery disease  6. History of anal canal squamous cell carcinoma, status post concurrent chemoradiation therapy (on remission)    Surgical History  Significant for   1. Hemorrhoid surgery  2. Chemoport placement  3. Cataract surgery  4. Tonsillectomy  5. Colectomy in 2009 by Dr. Sara Pleitez  6. Angioplasty of bilateral legs by Dr. Santana Lu    Allergies  No known medication allergies    Social History  He is a current smoker and he smokes approximately 1 pack/day for last 60 years. He drinks alcohol daily and denies illicit drug abuse. He is currently living in [facility]. Family History  Significant for pancreatic cancer in 1 of his brother. No other pertinent family history. Oncology History   Malignant neoplasm of lower lobe of right lung ([address]   1/8/2019 Initial Diagnosis    Malignant neoplasm of lower lobe of right lung ([address]     2/19/2019 - 2/26/2019 Radiation    Right lower lobe: 54 trey in 3 fractions SBRT       Review of Systems:   \"Per interval history; otherwise 10 point ROS is negative. \"  His energy level is pretty good, appetite, and sleep are stable. He denies fever, chills, night sweats, cough, shortness of breath, chest pain, hemoptysis, or palpitations. His bowel and bladder functions are normal. He denies nausea, vomiting, abdominal pain, diarrhea, constipation, dysuria, loss of appetite, or weight loss. He doesn't have neuropathy and he denies bleeding or clotting issues. He denies any pain in his body. Denies anxiety or depression. The rest of the systems are unremarkable. Vital Signs:  /79 (Site: Right Upper Arm, Position: Sitting, Cuff Size: Medium Adult)   Pulse 87   Temp 97.3 °F (36.3 °C) (Infrared)   Ht 5' 7\" (1.702 m)   Wt 123 lb (55.8 kg)   SpO2 91%   BMI 19.26 kg/m²     Physical Exam:  CONSTITUTIONAL: awake, no apparent distress, alert, cooperative,   EYES: pupils equal, round and reactive to light, sclera clear and conjunctiva normal  ENT: Normocephalic, without obvious abnormality, atraumatic  NECK: supple, symmetrical, no jugular venous distension and no carotid bruits   HEMATOLOGIC/LYMPHATIC: no cervical, supraclavicular or axillary lymphadenopathy   LUNGS: VBS, no wheezes, no crackles, no rhonchi, no increased work of breathing and clear to auscultation   CARDIOVASCULAR: regular rate and rhythm, normal S1 and S2, no murmur noted  ABDOMEN: soft, non-distended, normal bowel sounds x 4, non-tender, no masses palpated, no hepatosplenomegaly   MUSCULOSKELETAL: full range of motion noted, tone is normal  NEUROLOGIC: awake, alert, oriented to name, place and time. Motor skills grossly intact. SKIN: Normal skin color, texture, turgor and no jaundice.  appears intact   EXTREMITIES: no LE edema, no leg swelling, no cyanosis, no clubbing     Labs:  Hematology:  Lab Results   Component Value Date    WBC 5.5 11/09/2020    RBC 4.36 11/09/2020    HGB 14.4 11/09/2020    HCT 43.1 11/09/2020    MCV 99.0 11/09/2020    MCH 33.1 11/09/2020    MCHC 33.4 11/09/2020    RDW 13.5 11/09/2020     11/09/2020    MPV 9.3 11/09/2020    BANDSPCT 5 02/27/2012    SEGSPCT 74.0 02/27/2012    EOSRELPCT 1.9 11/09/2020    BASOPCT 1.1 11/09/2020    LYMPHOPCT 16.6 11/09/2020    MONOPCT 10.3 11/09/2020    BANDABS 0.32 02/27/2012    SEGSABS 4.8 02/27/2012    EOSABS 0.1 11/09/2020    BASOSABS 0.1 11/09/2020    LYMPHSABS 0.9 (L) 11/09/2020    MONOSABS 0.6 11/09/2020    DIFFTYPE MANUAL DIFFERENTIAL 02/27/2012    PLTM FEW LARGE PLATELET FORMS SEEN 18/06/7894     No results found for: ESR  Chemistry:  Lab Results   Component Value Date     11/09/2020    K 4.7 11/09/2020     11/09/2020    CO2 27 11/09/2020    BUN 15 11/09/2020    CREATININE 0.7 (L) 11/09/2020    GLUCOSE 108 (H) 11/09/2020    CALCIUM 8.8 11/09/2020    PROT 6.2 (L) 11/09/2020    LABALBU 4.0 11/09/2020    BILITOT 0.4 11/09/2020    ALKPHOS 68 11/09/2020    AST 16 11/09/2020    ALT 20 11/09/2020    LABGLOM >60 11/09/2020    GFRAA >60 11/09/2020    AGRATIO 1.8 11/09/2020    GLOB 2.2 11/09/2020     No results found for: MMA, LDH, HOMOCYSTEINE  No components found for: LD  Lab Results   Component Value Date    TSHHS 4.200 12/01/2020    T4FREE 0.92 12/01/2020     Immunology:  Lab Results   Component Value Date    PROT 6.2 (L) 11/09/2020     No results found for: Tani Mehta, KLFLCR  No results found for: B2M  Coagulation Panel:  Lab Results   Component Value Date    PROTIME 11.3 12/07/2018    INR 0.99 12/07/2018    APTT 24.9 12/07/2018     Anemia Panel:  No results found for: Lee Legato, FOLATE  Tumor Markers:  No results found for: , CEA, , LABCA2, PSA  Observations:  PHQ-9 Total Score: 0 (12/8/2020 10:53 AM)     Assessment & Plan:   Stage IA1 right lower lobe squamous cell lung cancer    PLAN  Mr. Celeste Juarez is a 67year old very pleasant gentleman who was found to have 1 cm ground glass nodule in his left lower lobe on screening low dose CT scan, done on July 20, 2018.

## 2020-12-08 ENCOUNTER — OFFICE VISIT (OUTPATIENT)
Dept: ONCOLOGY | Age: 75
End: 2020-12-08
Payer: MEDICARE

## 2020-12-08 ENCOUNTER — HOSPITAL ENCOUNTER (OUTPATIENT)
Dept: INFUSION THERAPY | Age: 75
Discharge: HOME | End: 2020-12-08
Payer: MEDICARE

## 2020-12-08 VITALS
HEART RATE: 87 BPM | SYSTOLIC BLOOD PRESSURE: 121 MMHG | OXYGEN SATURATION: 91 % | DIASTOLIC BLOOD PRESSURE: 79 MMHG | TEMPERATURE: 97.3 F | BODY MASS INDEX: 19.3 KG/M2 | WEIGHT: 123 LBS | HEIGHT: 67 IN

## 2020-12-08 PROCEDURE — 3017F COLORECTAL CA SCREEN DOC REV: CPT | Performed by: INTERNAL MEDICINE

## 2020-12-08 PROCEDURE — 4004F PT TOBACCO SCREEN RCVD TLK: CPT | Performed by: INTERNAL MEDICINE

## 2020-12-08 PROCEDURE — G8427 DOCREV CUR MEDS BY ELIG CLIN: HCPCS | Performed by: INTERNAL MEDICINE

## 2020-12-08 PROCEDURE — G8420 CALC BMI NORM PARAMETERS: HCPCS | Performed by: INTERNAL MEDICINE

## 2020-12-08 PROCEDURE — 1123F ACP DISCUSS/DSCN MKR DOCD: CPT | Performed by: INTERNAL MEDICINE

## 2020-12-08 PROCEDURE — G8482 FLU IMMUNIZE ORDER/ADMIN: HCPCS | Performed by: INTERNAL MEDICINE

## 2020-12-08 PROCEDURE — 99211 OFF/OP EST MAY X REQ PHY/QHP: CPT

## 2020-12-08 PROCEDURE — 4040F PNEUMOC VAC/ADMIN/RCVD: CPT | Performed by: INTERNAL MEDICINE

## 2020-12-08 PROCEDURE — 99213 OFFICE O/P EST LOW 20 MIN: CPT | Performed by: INTERNAL MEDICINE

## 2020-12-08 ASSESSMENT — PATIENT HEALTH QUESTIONNAIRE - PHQ9
SUM OF ALL RESPONSES TO PHQ QUESTIONS 1-9: 0
2. FEELING DOWN, DEPRESSED OR HOPELESS: 0
SUM OF ALL RESPONSES TO PHQ9 QUESTIONS 1 & 2: 0
1. LITTLE INTEREST OR PLEASURE IN DOING THINGS: 0
SUM OF ALL RESPONSES TO PHQ QUESTIONS 1-9: 0
SUM OF ALL RESPONSES TO PHQ QUESTIONS 1-9: 0

## 2021-05-10 ENCOUNTER — OFFICE VISIT (OUTPATIENT)
Dept: INTERNAL MEDICINE CLINIC | Age: 76
End: 2021-05-10
Payer: MEDICARE

## 2021-05-10 VITALS
OXYGEN SATURATION: 91 % | SYSTOLIC BLOOD PRESSURE: 118 MMHG | HEART RATE: 88 BPM | WEIGHT: 119 LBS | RESPIRATION RATE: 16 BRPM | BODY MASS INDEX: 18.64 KG/M2 | DIASTOLIC BLOOD PRESSURE: 74 MMHG

## 2021-05-10 DIAGNOSIS — I70.0 ATHEROSCLEROSIS OF AORTIC ARCH (HCC): ICD-10-CM

## 2021-05-10 DIAGNOSIS — I10 ESSENTIAL HYPERTENSION: ICD-10-CM

## 2021-05-10 DIAGNOSIS — E78.2 HYPERLIPIDEMIA, MIXED: ICD-10-CM

## 2021-05-10 DIAGNOSIS — I73.9 PVD (PERIPHERAL VASCULAR DISEASE) WITH CLAUDICATION (HCC): ICD-10-CM

## 2021-05-10 DIAGNOSIS — J42 CHRONIC BRONCHITIS, UNSPECIFIED CHRONIC BRONCHITIS TYPE (HCC): Primary | ICD-10-CM

## 2021-05-10 DIAGNOSIS — C20 RECTAL CANCER (HCC): ICD-10-CM

## 2021-05-10 DIAGNOSIS — C34.91 SCC (SQUAMOUS CELL CARCINOMA OF LUNG), RIGHT (HCC): ICD-10-CM

## 2021-05-10 LAB
A/G RATIO: 2.4 (ref 1.1–2.2)
ALBUMIN SERPL-MCNC: 4.5 G/DL (ref 3.4–5)
ALP BLD-CCNC: 64 U/L (ref 40–129)
ALT SERPL-CCNC: 20 U/L (ref 10–40)
ANION GAP SERPL CALCULATED.3IONS-SCNC: 12 MMOL/L (ref 3–16)
AST SERPL-CCNC: 18 U/L (ref 15–37)
BASOPHILS ABSOLUTE: 0 K/UL (ref 0–0.2)
BASOPHILS RELATIVE PERCENT: 0.7 %
BILIRUB SERPL-MCNC: 0.6 MG/DL (ref 0–1)
BUN BLDV-MCNC: 12 MG/DL (ref 7–20)
CALCIUM SERPL-MCNC: 9.6 MG/DL (ref 8.3–10.6)
CHLORIDE BLD-SCNC: 103 MMOL/L (ref 99–110)
CHOLESTEROL, TOTAL: 141 MG/DL (ref 0–199)
CO2: 27 MMOL/L (ref 21–32)
CREAT SERPL-MCNC: 0.7 MG/DL (ref 0.8–1.3)
EOSINOPHILS ABSOLUTE: 0.1 K/UL (ref 0–0.6)
EOSINOPHILS RELATIVE PERCENT: 2.3 %
GFR AFRICAN AMERICAN: >60
GFR NON-AFRICAN AMERICAN: >60
GLOBULIN: 1.9 G/DL
GLUCOSE BLD-MCNC: 112 MG/DL (ref 70–99)
HCT VFR BLD CALC: 45 % (ref 40.5–52.5)
HDLC SERPL-MCNC: 85 MG/DL (ref 40–60)
HEMOGLOBIN: 15.3 G/DL (ref 13.5–17.5)
LDL CHOLESTEROL CALCULATED: 30 MG/DL
LYMPHOCYTES ABSOLUTE: 0.9 K/UL (ref 1–5.1)
LYMPHOCYTES RELATIVE PERCENT: 16.2 %
MCH RBC QN AUTO: 33.4 PG (ref 26–34)
MCHC RBC AUTO-ENTMCNC: 34.1 G/DL (ref 31–36)
MCV RBC AUTO: 97.9 FL (ref 80–100)
MONOCYTES ABSOLUTE: 0.7 K/UL (ref 0–1.3)
MONOCYTES RELATIVE PERCENT: 11.4 %
NEUTROPHILS ABSOLUTE: 4 K/UL (ref 1.7–7.7)
NEUTROPHILS RELATIVE PERCENT: 69.4 %
PDW BLD-RTO: 13.4 % (ref 12.4–15.4)
PLATELET # BLD: 218 K/UL (ref 135–450)
PMV BLD AUTO: 9.1 FL (ref 5–10.5)
POTASSIUM SERPL-SCNC: 4.9 MMOL/L (ref 3.5–5.1)
RBC # BLD: 4.6 M/UL (ref 4.2–5.9)
SODIUM BLD-SCNC: 142 MMOL/L (ref 136–145)
TOTAL PROTEIN: 6.4 G/DL (ref 6.4–8.2)
TRIGL SERPL-MCNC: 130 MG/DL (ref 0–150)
TSH SERPL DL<=0.05 MIU/L-ACNC: 5.31 UIU/ML (ref 0.27–4.2)
VLDLC SERPL CALC-MCNC: 26 MG/DL
WBC # BLD: 5.7 K/UL (ref 4–11)

## 2021-05-10 PROCEDURE — 3023F SPIROM DOC REV: CPT | Performed by: INTERNAL MEDICINE

## 2021-05-10 PROCEDURE — 4040F PNEUMOC VAC/ADMIN/RCVD: CPT | Performed by: INTERNAL MEDICINE

## 2021-05-10 PROCEDURE — G8427 DOCREV CUR MEDS BY ELIG CLIN: HCPCS | Performed by: INTERNAL MEDICINE

## 2021-05-10 PROCEDURE — G8420 CALC BMI NORM PARAMETERS: HCPCS | Performed by: INTERNAL MEDICINE

## 2021-05-10 PROCEDURE — 99214 OFFICE O/P EST MOD 30 MIN: CPT | Performed by: INTERNAL MEDICINE

## 2021-05-10 PROCEDURE — 4004F PT TOBACCO SCREEN RCVD TLK: CPT | Performed by: INTERNAL MEDICINE

## 2021-05-10 PROCEDURE — 3017F COLORECTAL CA SCREEN DOC REV: CPT | Performed by: INTERNAL MEDICINE

## 2021-05-10 PROCEDURE — 36415 COLL VENOUS BLD VENIPUNCTURE: CPT | Performed by: INTERNAL MEDICINE

## 2021-05-10 PROCEDURE — 1123F ACP DISCUSS/DSCN MKR DOCD: CPT | Performed by: INTERNAL MEDICINE

## 2021-05-10 PROCEDURE — G8926 SPIRO NO PERF OR DOC: HCPCS | Performed by: INTERNAL MEDICINE

## 2021-05-10 RX ORDER — ALBUTEROL SULFATE 90 UG/1
2 AEROSOL, METERED RESPIRATORY (INHALATION) EVERY 6 HOURS PRN
Qty: 1 INHALER | Refills: 2 | Status: ON HOLD | OUTPATIENT
Start: 2021-05-10 | End: 2021-07-26

## 2021-05-10 RX ORDER — BUDESONIDE AND FORMOTEROL FUMARATE DIHYDRATE 80; 4.5 UG/1; UG/1
2 AEROSOL RESPIRATORY (INHALATION) 2 TIMES DAILY
Qty: 1 INHALER | Refills: 3 | Status: SHIPPED | OUTPATIENT
Start: 2021-05-10 | End: 2021-06-02

## 2021-05-10 RX ORDER — ATORVASTATIN CALCIUM 10 MG/1
10 TABLET, FILM COATED ORAL DAILY
Qty: 90 TABLET | Refills: 1 | Status: SHIPPED | OUTPATIENT
Start: 2021-05-10 | End: 2022-02-09 | Stop reason: SDUPTHER

## 2021-05-10 ASSESSMENT — PATIENT HEALTH QUESTIONNAIRE - PHQ9
2. FEELING DOWN, DEPRESSED OR HOPELESS: 0
SUM OF ALL RESPONSES TO PHQ QUESTIONS 1-9: 0
SUM OF ALL RESPONSES TO PHQ QUESTIONS 1-9: 0
1. LITTLE INTEREST OR PLEASURE IN DOING THINGS: 0

## 2021-05-10 NOTE — PROGRESS NOTES
Rishabh Gill  1945  05/10/21    SUBJECTIVE:  My sympathy w Anjali Nguyễn passing away suddenly. Hypertension: Stable. Denies CP, cough, visual changes, dizziness, palpitations or HA. COPD has been stable without any recent cough or wheezing, fevers, chills. STILL SMOKING, SOME BASELINE SOB/VERGARA NOTED. States breo not as effective and req back on symbicort 80mcg      LUNG CA, DOES CONT F/U W DR CLAYTON. Treated w RT. Rectal ca, had prior surgery w Dr Buck Villarreal and last colonscopy 2017 which was done every 3 yrs so ?may be due? We'll refer. PVD- also following periodically Dr Marilee Sommers, last arteriogram was 2017. Atherosclerosis of aorta, also cont statin and monitor      OBJECTIVE:    /74   Pulse 88   Resp 16   Wt 119 lb (54 kg)   SpO2 91%   BMI 18.64 kg/m²     Physical Exam  Vitals signs reviewed. Constitutional:       General: He is not in acute distress. Appearance: He is well-developed. HENT:      Head: Normocephalic and atraumatic. Eyes:      General: No scleral icterus. Right eye: No discharge. Left eye: No discharge. Conjunctiva/sclera: Conjunctivae normal.      Pupils: Pupils are equal, round, and reactive to light. Neck:      Musculoskeletal: Normal range of motion and neck supple. Thyroid: No thyromegaly. Vascular: No JVD. Trachea: No tracheal deviation. Cardiovascular:      Rate and Rhythm: Normal rate and regular rhythm. Heart sounds: Normal heart sounds. No murmur. No friction rub. No gallop. Pulmonary:      Effort: Pulmonary effort is normal. No respiratory distress. Breath sounds: Normal breath sounds. No wheezing or rales. Abdominal:      General: Bowel sounds are normal. There is no distension. Palpations: Abdomen is soft. There is no mass. Tenderness: There is no abdominal tenderness. There is no guarding or rebound. Musculoskeletal:         General: No tenderness.    Lymphadenopathy: Cervical: No cervical adenopathy. Skin:     General: Skin is warm and dry. Neurological:      Mental Status: He is alert. Psychiatric:         Mood and Affect: Mood normal.          ASSESSMENT:    1. Chronic bronchitis, unspecified chronic bronchitis type ([facility] [address]    2. SCC (squamous cell carcinoma of lung), right ([address]    3. Rectal cancer ([address]    4. PVD (peripheral vascular disease) with claudication ([facility] [address]    5. Essential hypertension    6. Atherosclerosis of aortic arch ([facility] [address]    7. Hyperlipidemia, mixed        PLAN:    Luigi Islas was seen today for hypertension. Diagnoses and all orders for this visit:    Chronic bronchitis, unspecified chronic bronchitis type ([facility] [address] - COPD stable on current regiment of inhalers/meds. Strongly advised smoking cessation and encouraged to keep working on cutting down use as much as possible. -     albuterol sulfate HFA (PROAIR HFA) 108 (90 Base) MCG/ACT inhaler; Inhale 2 puffs into the lungs every 6 hours as needed for Wheezing or Shortness of Breath  -     budesonide-formoterol (SYMBICORT) 80-4.5 MCG/ACT AERO; Inhale 2 puffs into the lungs 2 times daily    SCC (squamous cell carcinoma of lung), right (HCC) - cont f/u eval Dr Patricia Chavez, is s/p RT      Rectal cancer (HCC)-appears likely due for recheck colonoscopy if every 3 yrs, refer back to surgeon  -     External Referral To General Surgery    PVD (peripheral vascular disease) with claudication ([facility] [address] - Has no evidence of worsening claudication or pain at rest, cont current therapy and monitor for any progressive symptoms. Strongly advised smoking cessation and encouraged to keep working on cutting down use as much as possible. -     Comprehensive Metabolic Panel; Future  -     CBC Auto Differential; Future  -     Lipid Panel; Future    Essential hypertension - Blood pressure stable and will continue current regimen. Will plan periodic monitoring of renal function, electrolytes, lipid profile.      -     Comprehensive Metabolic Panel; Future  -     CBC Auto Differential; Future  -     Lipid Panel; Future  -     metoprolol tartrate (LOPRESSOR) 25 MG tablet; Take 0.5 tablets by mouth 2 times daily    Atherosclerosis of aortic arch ([address]- cont statin, check lab, cont periodic f/u Dr Koko Marroquin  -     Comprehensive Metabolic Panel; Future  -     CBC Auto Differential; Future  -     Lipid Panel; Future  -     atorvastatin (LIPITOR) 10 MG tablet; Take 1 tablet by mouth daily    Hyperlipidemia, mixed  - Pt will continue to work on a low fat diet and also exercise, wt loss as appropriate. Will continue periodic monitoring of fasting lipid profile, glucose, liver function. -     Comprehensive Metabolic Panel; Future  -     CBC Auto Differential; Future  -     Lipid Panel; Future  -     TSH without Reflex; Future  -     atorvastatin (LIPITOR) 10 MG tablet;  Take 1 tablet by mouth daily

## 2021-05-11 DIAGNOSIS — E05.90 HYPERTHYROIDISM: Primary | ICD-10-CM

## 2021-05-11 DIAGNOSIS — E05.90 HYPERTHYROIDISM: ICD-10-CM

## 2021-05-11 PROBLEM — E03.4 HYPOTHYROIDISM DUE TO ACQUIRED ATROPHY OF THYROID: Status: ACTIVE | Noted: 2021-05-11

## 2021-05-11 LAB
T4 FREE: 1.1 NG/DL (ref 0.9–1.8)
THYROID PEROXIDASE (TPO) ABS: 10 IU/ML

## 2021-05-11 RX ORDER — LEVOTHYROXINE SODIUM 0.03 MG/1
25 TABLET ORAL DAILY
Qty: 30 TABLET | Refills: 1 | Status: SHIPPED | OUTPATIENT
Start: 2021-05-11 | End: 2021-06-02

## 2021-05-26 ENCOUNTER — TELEPHONE (OUTPATIENT)
Dept: ONCOLOGY | Age: 76
End: 2021-05-26

## 2021-05-26 NOTE — TELEPHONE ENCOUNTER
Pt is going to [facility] on 6/1 for ct scan-- needs to arrive at at 10:00 for a 10:30-- npo 4 hours before-- pt is aware and stated understanding

## 2021-06-01 ENCOUNTER — HOSPITAL ENCOUNTER (OUTPATIENT)
Dept: CT IMAGING | Age: 76
Discharge: HOME | End: 2021-06-01
Payer: MEDICARE

## 2021-06-01 DIAGNOSIS — C34.91 PRIMARY LUNG SQUAMOUS CELL CARCINOMA, RIGHT (HCC): ICD-10-CM

## 2021-06-01 PROCEDURE — 71260 CT THORAX DX C+: CPT

## 2021-06-01 PROCEDURE — 2580000003 HC RX 258: Performed by: INTERNAL MEDICINE

## 2021-06-01 PROCEDURE — 6360000004 HC RX CONTRAST MEDICATION: Performed by: INTERNAL MEDICINE

## 2021-06-01 RX ORDER — SODIUM CHLORIDE 0.9 % (FLUSH) 0.9 %
10 SYRINGE (ML) INJECTION PRN
Status: DISCONTINUED | OUTPATIENT
Start: 2021-06-01 | End: 2021-06-02 | Stop reason: HOSPADM

## 2021-06-01 RX ADMIN — SODIUM CHLORIDE, PRESERVATIVE FREE 10 ML: 5 INJECTION INTRAVENOUS at 10:16

## 2021-06-01 RX ADMIN — IOPAMIDOL 75 ML: 755 INJECTION, SOLUTION INTRAVENOUS at 10:18

## 2021-06-02 DIAGNOSIS — J42 CHRONIC BRONCHITIS, UNSPECIFIED CHRONIC BRONCHITIS TYPE (HCC): ICD-10-CM

## 2021-06-02 RX ORDER — DILTIAZEM HYDROCHLORIDE 60 MG/1
2 TABLET, FILM COATED ORAL 2 TIMES DAILY
Qty: 1 INHALER | Refills: 3 | Status: SHIPPED | OUTPATIENT
Start: 2021-06-02 | End: 2021-09-27

## 2021-06-07 NOTE — PROGRESS NOTES
Patient Name: Claudio Ayala  Patient : 1945  Patient MRN: [id number]     Primary Oncologist: Vandana Swartz MD  Referring Provider: Zenaida Myers MD     Date of Service: 2021      Chief Complaint:   Chief Complaint   Patient presents with    Follow-up     Patient Active Problem List:     Malignant neoplasm of lower lobe of right lung      SCC (squamous cell carcinoma of lung), right     HPI:   Mr. Chad Heimlich is a 66-year-old very pleasant gentleman with medical history significant for hypertension, COPD, hyperlipidemia, peripheral arterial disease, coronary artery disease, history of anal canal squamous cell carcinoma, status post chemoradiation therapy (completed in 2009), initially referred to me on 2018 for evaluation of clinical stage IA1 right lower lobe squamous cell lung cancer. He stated that he has screening low dose CT scan of the chest (ordered by Dr. Roberto Mirza) on 2018 and it showed 10 mm ground glass left lower lobe nodule, which is new since . Subsequently he had PET/CT scan on 18 and it showed metabolically active 5 mm nodule in the right lower lobe, potentially malignant. There was no FDG activity associated with the ground glass left lower lobe nodule seen on prior CT scan. Repeat CT scan on 18 showed increasing irregular right lower lobe nodule 7.9 x 7.2 mm, considered neoplastic until proven otherwise. Resolution of left lower lobe ground glass density. He subsequently underwent CT guided biopsy of right lower lobe lung mass on 18 and final pathology was consistent with squamous cell carcinoma. He was subsequently referred to me for evaluation. PET/CT scan done on 2018 showed slightly increased size and increased uptake of the biopsy-proven malignancy in the right lower lobe. No findings of metastatic disease.     MRI of the brain with and without contrast done on 1/3/19 showed No acute intracranial abnormality. No intracranial metastatic disease. 2. Age-related parenchymal volume loss and mild chronic microvascular ischemic changes. He was seen by cardio thoracic surgeon and they don't think he is a candidate for surgery. He was subsequently seen by Dr. Jerad Anderson and he was treated with stereotactic body radiation therapy between February 19 and February 26, 2019. CT scan of the chest with contrast done on May 28, 2019 showed Slight interval decrease in size of a 5 mm right lower lobe pulmonary nodule which was previously biopsied. No new findings of metastatic disease in the chest.  He has moderate to severe emphysema. CT scan of the chest done on November 29, 2019 showed with the exception of a new 5 mm right lower lobe pulmonary nodule, the remainder of pulmonary nodules are not significantly changed as compared to prior, for which continued follow-up is recommended per protocol. Basilar mucous plugging. Atherosclerosis, including coronary artery calcification. Progressive compression deformities of T7 and T8 vertebral bodies as compared to prior as well as fracture of T7 spinous process. CT scan of the chest done on 6/4/20 showed no CT scan evidence of intrathoracic metastatic disease. Previously identified right lower lobe 5 mm pulmonary nodule has resolved, likely related to atelectasis. Severe emphysema. Minor mucous plugging in the right lower lobe again noted. CT scan of the chest with contrast done on December 1, 2020 showed interval appearance of a focal opacity in the superior segment of the right upper lobe. The finding is nonspecific and may represent infectious/inflammatory etiology, posttreatment changes [favored], or recurrence. Multiple irregular nodular opacities scattered throughout the lungs which may represent infectious/inflammatory etiology [favored] versus metastatic disease. The nodules are too small for PET CT characterization and biopsy.     CT scan of the chest done 1 pack/day for last 60 years. He drinks alcohol daily and denies illicit drug abuse. He is currently living in [facility]. Family History  Significant for pancreatic cancer in 1 of his brother. No other pertinent family history. Oncology History   Malignant neoplasm of lower lobe of right lung ([address]   1/8/2019 Initial Diagnosis    Malignant neoplasm of lower lobe of right lung ([address]     2/19/2019 - 2/26/2019 Radiation    Right lower lobe: 54 trey in 3 fractions SBRT       Review of Systems: \"Per interval history; otherwise 10 point ROS is negative. \"  His energy level is stable, appetite, and sleep are pretty good. He doesn't have fever, chills, night sweats, cough, shortness of breath, chest pain, hemoptysis, or palpitations. His bowel and bladder functions are normal. He doesn't have nausea, vomiting, abdominal pain, diarrhea, constipation, dysuria, loss of appetite, or weight loss. He denies neuropathy and he doesn't have bleeding or clotting issues. He doesn't have any pain in his body. No anxiety or depression. The rest of the systems are unremarkable.     Vital Signs:  BP (!) 166/72 (Site: Left Upper Arm, Position: Sitting, Cuff Size: Medium Adult)   Pulse 80   Temp 97.5 °F (36.4 °C) (Temporal)   Resp 18   Ht 5' 7\" (1.702 m)   Wt 117 lb 12.8 oz (53.4 kg)   SpO2 96%   BMI 18.45 kg/m²     Physical Exam:  CONSTITUTIONAL: awake, no apparent distress, alert, cooperative,   EYES: pupils equal, round and reactive to light, sclera clear and conjunctiva normal  ENT: Normocephalic, without obvious abnormality, atraumatic  NECK: supple, symmetrical, no jugular venous distension and no carotid bruits   HEMATOLOGIC/LYMPHATIC: no cervical, supraclavicular or axillary lymphadenopathy   LUNGS: VBS, no rhonchi, no increased work of breathingno wheezes, no crackles, clear to auscultation   CARDIOVASCULAR: regular rate and rhythm, normal S1 and S2, no murmur noted  ABDOMEN: soft, non-distended, normal bowel sounds x 4, non-tender, no masses palpated, no hepatosplenomegaly   MUSCULOSKELETAL: full range of motion noted, tone is normal  NEUROLOGIC: awake, alert, oriented to name, place and time. Motor skills grossly intact. SKIN: Normal skin color, texture, turgor and no jaundice.  appears intact   EXTREMITIES: no leg swelling, no cyanosis, no LE edema, no clubbing     Labs:  Hematology:  Lab Results   Component Value Date    WBC 5.7 05/10/2021    RBC 4.60 05/10/2021    HGB 15.3 05/10/2021    HCT 45.0 05/10/2021    MCV 97.9 05/10/2021    MCH 33.4 05/10/2021    MCHC 34.1 05/10/2021    RDW 13.4 05/10/2021     05/10/2021    MPV 9.1 05/10/2021    BANDSPCT 5 02/27/2012    SEGSPCT 74.0 02/27/2012    EOSRELPCT 2.3 05/10/2021    BASOPCT 0.7 05/10/2021    LYMPHOPCT 16.2 05/10/2021    MONOPCT 11.4 05/10/2021    BANDABS 0.32 02/27/2012    SEGSABS 4.8 02/27/2012    EOSABS 0.1 05/10/2021    BASOSABS 0.0 05/10/2021    LYMPHSABS 0.9 (L) 05/10/2021    MONOSABS 0.7 05/10/2021    DIFFTYPE MANUAL DIFFERENTIAL 02/27/2012    PLTM FEW LARGE PLATELET FORMS SEEN 49/28/2576     No results found for: ESR  Chemistry:  Lab Results   Component Value Date     05/10/2021    K 4.9 05/10/2021     05/10/2021    CO2 27 05/10/2021    BUN 12 05/10/2021    CREATININE 0.7 (L) 05/10/2021    GLUCOSE 112 (H) 05/10/2021    CALCIUM 9.6 05/10/2021    PROT 6.4 05/10/2021    LABALBU 4.5 05/10/2021    BILITOT 0.6 05/10/2021    ALKPHOS 64 05/10/2021    AST 18 05/10/2021    ALT 20 05/10/2021    LABGLOM >60 05/10/2021    GFRAA >60 05/10/2021    AGRATIO 2.4 (H) 05/10/2021    GLOB 1.9 05/10/2021     No results found for: MMA, LDH, HOMOCYSTEINE  No components found for: LD  Lab Results   Component Value Date    TSHHS 4.200 12/01/2020    T4FREE 1.1 05/10/2021     Immunology:  Lab Results   Component Value Date    PROT 6.4 05/10/2021     No results found for: JENNIFER Florian  No results found for: B2M  Coagulation Panel:  Lab Results Component Value Date    PROTIME 11.3 12/07/2018    INR 0.99 12/07/2018    APTT 24.9 12/07/2018     Anemia Panel:  No results found for: [id number], FOLATE  Tumor Markers:  No results found for: , CEA, , LABCA2, PSA  Observations:  No data recorded     Assessment & Plan:   Stage IA1 right lower lobe squamous cell lung cancer    PLAN  Mr. Sapphire Dominique is a 67year old very pleasant gentleman who was found to have 1 cm ground glass nodule in his left lower lobe on screening low dose CT scan, done on July 20, 2018. Subsequently, he had PET/CT scan and it showed metabolically active 5 mm nodule in right lower lobe, potentially malignant. No metabolic activity in left lower lobe ground glass nodule. Repeat CT scan of the chest on November 14, 2018 showed increased in size of right lower lobe lung mass, considered neoplastic until proven otherwise. Subsequently had CT guided biopsy of the right lower lobe lung mass on Decmeber 7, 2018 and final pathology was consistent with squamous cell carcinoma. PET/CT scan done on December 27, 2018 showed slightly increased size and increased uptake of the biopsy-proven malignancy in the right lower lobe. No findings of metastatic disease. MRI of the brain with and without contrast done on 1/3/19 showed No acute intracranial abnormality. No intracranial metastatic disease. 2. Age-related parenchymal volume loss and mild chronic microvascular ischemic changes. He was seen by cardio thoracic surgeon and they don't think he is a candidate for surgery. He was subsequently seen by Dr. Debbie Delarosa and he was treated with stereotactic body radiation therapy between February 19 and February 26, 2019. He has been in a close observation since then. CT scan of the chest with contrast done on December 1, 2020 showed interval appearance of a focal opacity in the superior segment of the right upper lobe.   The finding is nonspecific and may represent infectious/inflammatory etiology, posttreatment changes [favored], or recurrence. Multiple irregular nodular opacities scattered throughout the lungs which may represent infectious/inflammatory etiology [favored] versus metastatic disease. The nodules are too small for PET CT characterization and biopsy. CT scan of the chest done on June 1, 2021 showed similar evolving radiation fibrosis of the right upper lobe. Spiculated part solid nodule of the right upper lobe measuring 1.8 cm with 0.3 cm solid component suspicious for evolving adenocarcinoma-spectrum lesion. No interval change in additional bilateral subcentimeter and pericentimeter groundglass and solid nodules. These also may represent evolving adenocarcinoma spectrum lesions and warrant attention on follow-up. On June 8, 2021, he presented to me for follow-up. I have been following him for Stage IA1 right lower lobe squamous cell lung cancer and he is status post stereotactic body radiation therapy. He has been under close observation since then. On today's visit, I reviewed with him findings of CT scan of the chest done on 6/1/2021 and we looked at his CT scans together. Since there is a new spiculated part solid nodule in RUL (solid component is still small, 0.3 cm), I recommend him to have repeat CT chest in 3 months. It is below the resolution of PET scan for now. I will set up for CT chest in 3 months and I will see him again after that. Further management will be based on findings on CT scan. Health maintenance - I recommend him to have age-appropriate cancer screening, exercise, low-fat and low-sodium diet. I answered all his questions and concerns for today. I asked him to follow-up with primary care physician, Dr. Juan Carlos Cano, and Dr. Shirley Tobias on regular basis. Recent imaging and labs were reviewed and discussed with the patient.

## 2021-06-08 ENCOUNTER — HOSPITAL ENCOUNTER (OUTPATIENT)
Dept: INFUSION THERAPY | Age: 76
Discharge: HOME | End: 2021-06-08
Payer: MEDICARE

## 2021-06-08 ENCOUNTER — OFFICE VISIT (OUTPATIENT)
Dept: ONCOLOGY | Age: 76
End: 2021-06-08
Payer: MEDICARE

## 2021-06-08 VITALS
OXYGEN SATURATION: 96 % | HEIGHT: 67 IN | WEIGHT: 117.8 LBS | TEMPERATURE: 97.5 F | HEART RATE: 80 BPM | RESPIRATION RATE: 18 BRPM | BODY MASS INDEX: 18.49 KG/M2 | SYSTOLIC BLOOD PRESSURE: 166 MMHG | DIASTOLIC BLOOD PRESSURE: 72 MMHG

## 2021-06-08 DIAGNOSIS — C34.31 MALIGNANT NEOPLASM OF LOWER LOBE OF RIGHT LUNG (HCC): Primary | ICD-10-CM

## 2021-06-08 PROCEDURE — G8419 CALC BMI OUT NRM PARAM NOF/U: HCPCS | Performed by: INTERNAL MEDICINE

## 2021-06-08 PROCEDURE — 99214 OFFICE O/P EST MOD 30 MIN: CPT | Performed by: INTERNAL MEDICINE

## 2021-06-08 PROCEDURE — 99211 OFF/OP EST MAY X REQ PHY/QHP: CPT

## 2021-06-08 PROCEDURE — 3017F COLORECTAL CA SCREEN DOC REV: CPT | Performed by: INTERNAL MEDICINE

## 2021-06-08 PROCEDURE — G8427 DOCREV CUR MEDS BY ELIG CLIN: HCPCS | Performed by: INTERNAL MEDICINE

## 2021-06-08 PROCEDURE — 4040F PNEUMOC VAC/ADMIN/RCVD: CPT | Performed by: INTERNAL MEDICINE

## 2021-06-08 PROCEDURE — 1123F ACP DISCUSS/DSCN MKR DOCD: CPT | Performed by: INTERNAL MEDICINE

## 2021-06-08 PROCEDURE — 4004F PT TOBACCO SCREEN RCVD TLK: CPT | Performed by: INTERNAL MEDICINE

## 2021-06-08 NOTE — PROGRESS NOTES
MA Rooming Questions  Patient: Aretha Gay  MRN: [id number]    Date: 6/8/2021        1. Do you have any new issues?   no         2. Do you need any refills on medications?    no    3. Have you had any imaging done since your last visit?   no    4. Have you been hospitalized or seen in the emergency room since your last visit here?   no    5. Did the patient have a depression screening completed today?  No    No data recorded     PHQ-9 Given to (if applicable):               PHQ-9 Score (if applicable):                     [] Positive     []  Negative              Does question #9 need addressed (if applicable)                     [] Yes    []  No               Kitty Escalante CMA

## 2021-07-22 ENCOUNTER — HOSPITAL ENCOUNTER (INPATIENT)
Age: 76
LOS: 4 days | Discharge: HOME | DRG: 199 | End: 2021-07-26
Attending: STUDENT IN AN ORGANIZED HEALTH CARE EDUCATION/TRAINING PROGRAM | Admitting: INTERNAL MEDICINE
Payer: MEDICARE

## 2021-07-22 ENCOUNTER — APPOINTMENT (OUTPATIENT)
Dept: GENERAL RADIOLOGY | Age: 76
DRG: 199 | End: 2021-07-22
Payer: MEDICARE

## 2021-07-22 DIAGNOSIS — J93.9 PNEUMOTHORAX ON RIGHT: ICD-10-CM

## 2021-07-22 DIAGNOSIS — R06.03 RESPIRATORY DISTRESS: Primary | ICD-10-CM

## 2021-07-22 PROBLEM — J96.01 ACUTE RESPIRATORY FAILURE WITH HYPOXIA (HCC): Status: ACTIVE | Noted: 2021-07-22

## 2021-07-22 LAB
ALBUMIN SERPL-MCNC: 4.6 GM/DL (ref 3.4–5)
ALP BLD-CCNC: 71 IU/L (ref 40–129)
ALT SERPL-CCNC: 27 U/L (ref 10–40)
ANION GAP SERPL CALCULATED.3IONS-SCNC: 12 MMOL/L (ref 4–16)
AST SERPL-CCNC: 24 IU/L (ref 15–37)
BACTERIA: NEGATIVE /HPF
BASE EXCESS MIXED: 1.5 (ref 0–1.2)
BASOPHILS ABSOLUTE: 0 K/CU MM
BASOPHILS RELATIVE PERCENT: 0.3 % (ref 0–1)
BILIRUB SERPL-MCNC: 0.5 MG/DL (ref 0–1)
BILIRUBIN DIRECT: 0.2 MG/DL (ref 0–0.3)
BILIRUBIN URINE: NEGATIVE MG/DL
BILIRUBIN, INDIRECT: 0.3 MG/DL (ref 0–0.7)
BLOOD, URINE: NEGATIVE
BUN BLDV-MCNC: 21 MG/DL (ref 6–23)
CALCIUM SERPL-MCNC: 9.1 MG/DL (ref 8.3–10.6)
CHLORIDE BLD-SCNC: 97 MMOL/L (ref 99–110)
CLARITY: CLEAR
CO2: 27 MMOL/L (ref 21–32)
COLOR: YELLOW
COMMENT: ABNORMAL
CREAT SERPL-MCNC: 0.7 MG/DL (ref 0.9–1.3)
DIFFERENTIAL TYPE: ABNORMAL
EKG ATRIAL RATE: 106 BPM
EKG DIAGNOSIS: NORMAL
EKG P AXIS: 81 DEGREES
EKG P-R INTERVAL: 170 MS
EKG Q-T INTERVAL: 328 MS
EKG QRS DURATION: 68 MS
EKG QTC CALCULATION (BAZETT): 435 MS
EKG R AXIS: -39 DEGREES
EKG T AXIS: 100 DEGREES
EKG VENTRICULAR RATE: 106 BPM
EOSINOPHILS ABSOLUTE: 0 K/CU MM
EOSINOPHILS RELATIVE PERCENT: 0.2 % (ref 0–3)
GFR AFRICAN AMERICAN: >60 ML/MIN/1.73M2
GFR NON-AFRICAN AMERICAN: >60 ML/MIN/1.73M2
GLUCOSE BLD-MCNC: 122 MG/DL (ref 70–99)
GLUCOSE BLD-MCNC: 171 MG/DL (ref 70–99)
GLUCOSE, URINE: NEGATIVE MG/DL
GRANULAR CASTS: 3 /LPF
HCO3 VENOUS: 27.7 MMOL/L (ref 19–25)
HCT VFR BLD CALC: 47.4 % (ref 42–52)
HEMOGLOBIN: 15.2 GM/DL (ref 13.5–18)
HYALINE CASTS: 2 /LPF
IMMATURE NEUTROPHIL %: 0.7 % (ref 0–0.43)
INR BLD: 0.87 INDEX
KETONES, URINE: NEGATIVE MG/DL
LEUKOCYTE ESTERASE, URINE: NEGATIVE
LIPASE: 21 IU/L (ref 13–60)
LYMPHOCYTES ABSOLUTE: 0.6 K/CU MM
LYMPHOCYTES RELATIVE PERCENT: 5.3 % (ref 24–44)
MCH RBC QN AUTO: 32.1 PG (ref 27–31)
MCHC RBC AUTO-ENTMCNC: 32.1 % (ref 32–36)
MCV RBC AUTO: 100.2 FL (ref 78–100)
MONOCYTES ABSOLUTE: 0.8 K/CU MM
MONOCYTES RELATIVE PERCENT: 6.8 % (ref 0–4)
MUCUS: ABNORMAL HPF
NITRITE URINE, QUANTITATIVE: NEGATIVE
NUCLEATED RBC %: 0 %
O2 SAT, VEN: 71.6 % (ref 50–70)
PCO2, VEN: 49 MMHG (ref 38–52)
PDW BLD-RTO: 13.6 % (ref 11.7–14.9)
PH VENOUS: 7.36 (ref 7.32–7.42)
PH, URINE: 5 (ref 5–8)
PLATELET # BLD: 265 K/CU MM (ref 140–440)
PMV BLD AUTO: 9.1 FL (ref 7.5–11.1)
PO2, VEN: 42 MMHG (ref 28–48)
POTASSIUM SERPL-SCNC: 4.7 MMOL/L (ref 3.5–5.1)
PRO-BNP: 1237 PG/ML
PROTEIN UA: >500 MG/DL
PROTHROMBIN TIME: 11.2 SECONDS (ref 11.7–14.5)
RBC # BLD: 4.73 M/CU MM (ref 4.6–6.2)
RBC URINE: <1 /HPF (ref 0–3)
SEGMENTED NEUTROPHILS ABSOLUTE COUNT: 10.3 K/CU MM
SEGMENTED NEUTROPHILS RELATIVE PERCENT: 86.7 % (ref 36–66)
SODIUM BLD-SCNC: 136 MMOL/L (ref 135–145)
SPECIFIC GRAVITY UA: 1.02 (ref 1–1.03)
TOTAL IMMATURE NEUTOROPHIL: 0.08 K/CU MM
TOTAL NUCLEATED RBC: 0 K/CU MM
TOTAL PROTEIN: 6.9 GM/DL (ref 6.4–8.2)
TRICHOMONAS: ABNORMAL /HPF
TROPONIN T: 0.02 NG/ML
TROPONIN T: 0.02 NG/ML
TROPONIN T: 0.03 NG/ML
UROBILINOGEN, URINE: NEGATIVE MG/DL (ref 0.2–1)
WBC # BLD: 11.9 K/CU MM (ref 4–10.5)
WBC UA: 1 /HPF (ref 0–2)

## 2021-07-22 PROCEDURE — 96375 TX/PRO/DX INJ NEW DRUG ADDON: CPT

## 2021-07-22 PROCEDURE — 0W9930Z DRAINAGE OF RIGHT PLEURAL CAVITY WITH DRAINAGE DEVICE, PERCUTANEOUS APPROACH: ICD-10-PCS | Performed by: FAMILY MEDICINE

## 2021-07-22 PROCEDURE — 2580000003 HC RX 258: Performed by: INTERNAL MEDICINE

## 2021-07-22 PROCEDURE — 80053 COMPREHEN METABOLIC PANEL: CPT

## 2021-07-22 PROCEDURE — 84484 ASSAY OF TROPONIN QUANT: CPT

## 2021-07-22 PROCEDURE — 82962 GLUCOSE BLOOD TEST: CPT

## 2021-07-22 PROCEDURE — 71045 X-RAY EXAM CHEST 1 VIEW: CPT

## 2021-07-22 PROCEDURE — 93010 ELECTROCARDIOGRAM REPORT: CPT | Performed by: INTERNAL MEDICINE

## 2021-07-22 PROCEDURE — 85610 PROTHROMBIN TIME: CPT

## 2021-07-22 PROCEDURE — 81001 URINALYSIS AUTO W/SCOPE: CPT

## 2021-07-22 PROCEDURE — 99285 EMERGENCY DEPT VISIT HI MDM: CPT

## 2021-07-22 PROCEDURE — 83690 ASSAY OF LIPASE: CPT

## 2021-07-22 PROCEDURE — 2000000000 HC ICU R&B

## 2021-07-22 PROCEDURE — 93005 ELECTROCARDIOGRAM TRACING: CPT | Performed by: STUDENT IN AN ORGANIZED HEALTH CARE EDUCATION/TRAINING PROGRAM

## 2021-07-22 PROCEDURE — 83880 ASSAY OF NATRIURETIC PEPTIDE: CPT

## 2021-07-22 PROCEDURE — 6360000002 HC RX W HCPCS: Performed by: STUDENT IN AN ORGANIZED HEALTH CARE EDUCATION/TRAINING PROGRAM

## 2021-07-22 PROCEDURE — 94660 CPAP INITIATION&MGMT: CPT

## 2021-07-22 PROCEDURE — 6370000000 HC RX 637 (ALT 250 FOR IP): Performed by: INTERNAL MEDICINE

## 2021-07-22 PROCEDURE — 6360000002 HC RX W HCPCS: Performed by: NURSE PRACTITIONER

## 2021-07-22 PROCEDURE — 94640 AIRWAY INHALATION TREATMENT: CPT

## 2021-07-22 PROCEDURE — 85025 COMPLETE CBC W/AUTO DIFF WBC: CPT

## 2021-07-22 PROCEDURE — 6360000002 HC RX W HCPCS: Performed by: INTERNAL MEDICINE

## 2021-07-22 PROCEDURE — 96374 THER/PROPH/DIAG INJ IV PUSH: CPT

## 2021-07-22 PROCEDURE — 2700000000 HC OXYGEN THERAPY PER DAY

## 2021-07-22 PROCEDURE — 2500000003 HC RX 250 WO HCPCS: Performed by: STUDENT IN AN ORGANIZED HEALTH CARE EDUCATION/TRAINING PROGRAM

## 2021-07-22 PROCEDURE — 82805 BLOOD GASES W/O2 SATURATION: CPT

## 2021-07-22 PROCEDURE — 2500000003 HC RX 250 WO HCPCS: Performed by: PHYSICIAN ASSISTANT

## 2021-07-22 PROCEDURE — 6370000000 HC RX 637 (ALT 250 FOR IP): Performed by: STUDENT IN AN ORGANIZED HEALTH CARE EDUCATION/TRAINING PROGRAM

## 2021-07-22 PROCEDURE — 36415 COLL VENOUS BLD VENIPUNCTURE: CPT

## 2021-07-22 PROCEDURE — 82248 BILIRUBIN DIRECT: CPT

## 2021-07-22 RX ORDER — HYDRALAZINE HYDROCHLORIDE 20 MG/ML
10 INJECTION INTRAMUSCULAR; INTRAVENOUS EVERY 6 HOURS PRN
Status: DISCONTINUED | OUTPATIENT
Start: 2021-07-22 | End: 2021-07-26 | Stop reason: HOSPADM

## 2021-07-22 RX ORDER — MORPHINE SULFATE 2 MG/ML
2 INJECTION, SOLUTION INTRAMUSCULAR; INTRAVENOUS EVERY 4 HOURS PRN
Status: DISCONTINUED | OUTPATIENT
Start: 2021-07-22 | End: 2021-07-26 | Stop reason: HOSPADM

## 2021-07-22 RX ORDER — ATORVASTATIN CALCIUM 10 MG/1
10 TABLET, FILM COATED ORAL DAILY
Status: DISCONTINUED | OUTPATIENT
Start: 2021-07-22 | End: 2021-07-26 | Stop reason: HOSPADM

## 2021-07-22 RX ORDER — LEVOTHYROXINE SODIUM 0.03 MG/1
25 TABLET ORAL DAILY
Status: DISCONTINUED | OUTPATIENT
Start: 2021-07-23 | End: 2021-07-26 | Stop reason: HOSPADM

## 2021-07-22 RX ORDER — ONDANSETRON 2 MG/ML
4 INJECTION INTRAMUSCULAR; INTRAVENOUS EVERY 6 HOURS PRN
Status: DISCONTINUED | OUTPATIENT
Start: 2021-07-22 | End: 2021-07-26 | Stop reason: HOSPADM

## 2021-07-22 RX ORDER — ALBUTEROL SULFATE 2.5 MG/3ML
5 SOLUTION RESPIRATORY (INHALATION) ONCE
Status: COMPLETED | OUTPATIENT
Start: 2021-07-22 | End: 2021-07-22

## 2021-07-22 RX ORDER — MAGNESIUM SULFATE IN WATER 40 MG/ML
2000 INJECTION, SOLUTION INTRAVENOUS PRN
Status: DISCONTINUED | OUTPATIENT
Start: 2021-07-22 | End: 2021-07-26 | Stop reason: HOSPADM

## 2021-07-22 RX ORDER — ALBUTEROL SULFATE 90 UG/1
2 AEROSOL, METERED RESPIRATORY (INHALATION) EVERY 6 HOURS PRN
Status: DISCONTINUED | OUTPATIENT
Start: 2021-07-22 | End: 2021-07-26 | Stop reason: HOSPADM

## 2021-07-22 RX ORDER — ONDANSETRON 4 MG/1
4 TABLET, ORALLY DISINTEGRATING ORAL EVERY 8 HOURS PRN
Status: DISCONTINUED | OUTPATIENT
Start: 2021-07-22 | End: 2021-07-26 | Stop reason: HOSPADM

## 2021-07-22 RX ORDER — ACETAMINOPHEN 650 MG/1
650 SUPPOSITORY RECTAL EVERY 6 HOURS PRN
Status: DISCONTINUED | OUTPATIENT
Start: 2021-07-22 | End: 2021-07-26 | Stop reason: HOSPADM

## 2021-07-22 RX ORDER — SODIUM CHLORIDE 0.9 % (FLUSH) 0.9 %
5-40 SYRINGE (ML) INJECTION EVERY 12 HOURS SCHEDULED
Status: DISCONTINUED | OUTPATIENT
Start: 2021-07-22 | End: 2021-07-26 | Stop reason: HOSPADM

## 2021-07-22 RX ORDER — METHYLPREDNISOLONE SODIUM SUCCINATE 125 MG/2ML
125 INJECTION, POWDER, LYOPHILIZED, FOR SOLUTION INTRAMUSCULAR; INTRAVENOUS ONCE
Status: COMPLETED | OUTPATIENT
Start: 2021-07-22 | End: 2021-07-22

## 2021-07-22 RX ORDER — FENTANYL CITRATE 50 UG/ML
100 INJECTION, SOLUTION INTRAMUSCULAR; INTRAVENOUS ONCE
Status: COMPLETED | OUTPATIENT
Start: 2021-07-22 | End: 2021-07-22

## 2021-07-22 RX ORDER — BUDESONIDE AND FORMOTEROL FUMARATE DIHYDRATE 80; 4.5 UG/1; UG/1
2 AEROSOL RESPIRATORY (INHALATION) 2 TIMES DAILY
Status: DISCONTINUED | OUTPATIENT
Start: 2021-07-22 | End: 2021-07-26 | Stop reason: HOSPADM

## 2021-07-22 RX ORDER — LORAZEPAM 2 MG/ML
0.5 INJECTION INTRAMUSCULAR ONCE
Status: COMPLETED | OUTPATIENT
Start: 2021-07-22 | End: 2021-07-22

## 2021-07-22 RX ORDER — NICOTINE 21 MG/24HR
1 PATCH, TRANSDERMAL 24 HOURS TRANSDERMAL DAILY
Status: DISCONTINUED | OUTPATIENT
Start: 2021-07-22 | End: 2021-07-26 | Stop reason: HOSPADM

## 2021-07-22 RX ORDER — NITROGLYCERIN 20 MG/100ML
5-200 INJECTION INTRAVENOUS CONTINUOUS
Status: DISCONTINUED | OUTPATIENT
Start: 2021-07-22 | End: 2021-07-26 | Stop reason: HOSPADM

## 2021-07-22 RX ORDER — IPRATROPIUM BROMIDE AND ALBUTEROL SULFATE 2.5; .5 MG/3ML; MG/3ML
1 SOLUTION RESPIRATORY (INHALATION)
Status: DISCONTINUED | OUTPATIENT
Start: 2021-07-22 | End: 2021-07-22

## 2021-07-22 RX ORDER — LANOLIN ALCOHOL/MO/W.PET/CERES
6 CREAM (GRAM) TOPICAL NIGHTLY PRN
Status: DISCONTINUED | OUTPATIENT
Start: 2021-07-22 | End: 2021-07-26 | Stop reason: HOSPADM

## 2021-07-22 RX ORDER — POLYETHYLENE GLYCOL 3350 17 G/17G
17 POWDER, FOR SOLUTION ORAL DAILY PRN
Status: DISCONTINUED | OUTPATIENT
Start: 2021-07-22 | End: 2021-07-26 | Stop reason: HOSPADM

## 2021-07-22 RX ORDER — ACETAMINOPHEN 325 MG/1
650 TABLET ORAL EVERY 6 HOURS PRN
Status: DISCONTINUED | OUTPATIENT
Start: 2021-07-22 | End: 2021-07-26 | Stop reason: HOSPADM

## 2021-07-22 RX ORDER — METHYLPREDNISOLONE SODIUM SUCCINATE 125 MG/2ML
40 INJECTION, POWDER, LYOPHILIZED, FOR SOLUTION INTRAMUSCULAR; INTRAVENOUS EVERY 12 HOURS
Status: DISCONTINUED | OUTPATIENT
Start: 2021-07-22 | End: 2021-07-26 | Stop reason: HOSPADM

## 2021-07-22 RX ORDER — IPRATROPIUM BROMIDE AND ALBUTEROL SULFATE 2.5; .5 MG/3ML; MG/3ML
1 SOLUTION RESPIRATORY (INHALATION) ONCE
Status: COMPLETED | OUTPATIENT
Start: 2021-07-22 | End: 2021-07-22

## 2021-07-22 RX ORDER — POTASSIUM CHLORIDE 7.45 MG/ML
10 INJECTION INTRAVENOUS PRN
Status: DISCONTINUED | OUTPATIENT
Start: 2021-07-22 | End: 2021-07-26 | Stop reason: HOSPADM

## 2021-07-22 RX ORDER — IPRATROPIUM BROMIDE AND ALBUTEROL SULFATE 2.5; .5 MG/3ML; MG/3ML
1 SOLUTION RESPIRATORY (INHALATION)
Status: DISCONTINUED | OUTPATIENT
Start: 2021-07-22 | End: 2021-07-23

## 2021-07-22 RX ORDER — SODIUM CHLORIDE 0.9 % (FLUSH) 0.9 %
5-40 SYRINGE (ML) INJECTION PRN
Status: DISCONTINUED | OUTPATIENT
Start: 2021-07-22 | End: 2021-07-26 | Stop reason: HOSPADM

## 2021-07-22 RX ORDER — SODIUM CHLORIDE 9 MG/ML
25 INJECTION, SOLUTION INTRAVENOUS PRN
Status: DISCONTINUED | OUTPATIENT
Start: 2021-07-22 | End: 2021-07-26 | Stop reason: HOSPADM

## 2021-07-22 RX ORDER — LIDOCAINE HYDROCHLORIDE AND EPINEPHRINE BITARTRATE 20; .01 MG/ML; MG/ML
20 INJECTION, SOLUTION SUBCUTANEOUS ONCE
Status: COMPLETED | OUTPATIENT
Start: 2021-07-22 | End: 2021-07-22

## 2021-07-22 RX ADMIN — NITROGLYCERIN 50 MCG/MIN: 20 INJECTION INTRAVENOUS at 13:55

## 2021-07-22 RX ADMIN — METOPROLOL TARTRATE 12.5 MG: 25 TABLET, FILM COATED ORAL at 20:43

## 2021-07-22 RX ADMIN — METHYLPREDNISOLONE SODIUM SUCCINATE 125 MG: 125 INJECTION, POWDER, LYOPHILIZED, FOR SOLUTION INTRAMUSCULAR; INTRAVENOUS at 13:22

## 2021-07-22 RX ADMIN — LORAZEPAM 0.5 MG: 2 INJECTION INTRAMUSCULAR; INTRAVENOUS at 13:40

## 2021-07-22 RX ADMIN — LIDOCAINE HYDROCHLORIDE,EPINEPHRINE BITARTRATE 20 ML: 20; .01 INJECTION, SOLUTION INFILTRATION; PERINEURAL at 15:22

## 2021-07-22 RX ADMIN — METHYLPREDNISOLONE SODIUM SUCCINATE 40 MG: 125 INJECTION, POWDER, LYOPHILIZED, FOR SOLUTION INTRAMUSCULAR; INTRAVENOUS at 20:45

## 2021-07-22 RX ADMIN — SODIUM CHLORIDE, PRESERVATIVE FREE 10 ML: 5 INJECTION INTRAVENOUS at 20:46

## 2021-07-22 RX ADMIN — ACETAMINOPHEN 650 MG: 325 TABLET ORAL at 18:32

## 2021-07-22 RX ADMIN — IPRATROPIUM BROMIDE AND ALBUTEROL SULFATE 1 AMPULE: .5; 3 SOLUTION RESPIRATORY (INHALATION) at 19:58

## 2021-07-22 RX ADMIN — MORPHINE SULFATE 2 MG: 2 INJECTION, SOLUTION INTRAMUSCULAR; INTRAVENOUS at 21:16

## 2021-07-22 RX ADMIN — BUDESONIDE AND FORMOTEROL FUMARATE DIHYDRATE 2 PUFF: 80; 4.5 AEROSOL RESPIRATORY (INHALATION) at 19:59

## 2021-07-22 RX ADMIN — ALBUTEROL SULFATE 5 MG: 2.5 SOLUTION RESPIRATORY (INHALATION) at 13:14

## 2021-07-22 RX ADMIN — FENTANYL CITRATE 100 MCG: 50 INJECTION, SOLUTION INTRAMUSCULAR; INTRAVENOUS at 15:20

## 2021-07-22 RX ADMIN — IPRATROPIUM BROMIDE AND ALBUTEROL SULFATE 1 AMPULE: .5; 3 SOLUTION RESPIRATORY (INHALATION) at 13:14

## 2021-07-22 ASSESSMENT — PAIN DESCRIPTION - ORIENTATION
ORIENTATION: RIGHT

## 2021-07-22 ASSESSMENT — PAIN DESCRIPTION - PAIN TYPE
TYPE: ACUTE PAIN

## 2021-07-22 ASSESSMENT — PAIN DESCRIPTION - FREQUENCY
FREQUENCY: INTERMITTENT

## 2021-07-22 ASSESSMENT — PAIN DESCRIPTION - DESCRIPTORS
DESCRIPTORS: ACHING

## 2021-07-22 ASSESSMENT — PAIN SCALES - GENERAL
PAINLEVEL_OUTOF10: 5
PAINLEVEL_OUTOF10: 4
PAINLEVEL_OUTOF10: 7
PAINLEVEL_OUTOF10: 0
PAINLEVEL_OUTOF10: 5
PAINLEVEL_OUTOF10: 3

## 2021-07-22 ASSESSMENT — PAIN DESCRIPTION - LOCATION
LOCATION: RIB CAGE

## 2021-07-22 NOTE — PROGRESS NOTES
Medication History  [facility]    Patient Name: Lisa Evans 1945     Medication history has been completed by: Roberta Tran CPhT    Source(s) of information: patient, family and insurance claims     Primary Care Physician: Luann Reed MD     Pharmacy: [facility]    Allergies as of 07/22/2021    (No Known Allergies)        Prior to Admission medications    Medication Sig Start Date End Date Taking? Authorizing Provider   SYMBICORT 80-4.5 MCG/ACT AERO Inhale 2 puffs into the lungs 2 times daily 6/2/21  Yes Luann Reed MD   levothyroxine (SYNTHROID) 25 MCG tablet TAKE 1 TABLET BY MOUTH EVERY DAY 6/2/21  Yes Luann Reed MD   albuterol sulfate HFA (PROAIR HFA) 108 (90 Base) MCG/ACT inhaler Inhale 2 puffs into the lungs every 6 hours as needed for Wheezing or Shortness of Breath 5/10/21  Yes Luann Reed MD   metoprolol tartrate (LOPRESSOR) 25 MG tablet Take 0.5 tablets by mouth 2 times daily 5/10/21  Yes Luann Reed MD   atorvastatin (LIPITOR) 10 MG tablet Take 1 tablet by mouth daily 5/10/21  Yes Luann Reed MD   Ipratropium-Albuterol (ALBUTEROL-IPRATROPIUM IN) Inhale 2 puffs into the lungs 4 times daily as needed   Yes Historical Provider, MD   OXYGEN Inhale into the lungs nightly   Yes Historical Provider, MD   Multiple Vitamins-Minerals (PRESERVISION AREDS PO) Take by mouth daily   Yes Historical Provider, MD     Medications removed from list (include reason, ex. noncompliance, medication cost, therapy complete etc.):   Spiriva patient not using (ordered)    Medications requiring reconciliation with provider:    Spiriva patient not using (ordered)    Comments:  Medication list reviewed with patient, family and insurance claims verified.     To my knowledge the above medication history is accurate as of 7/22/2021 2:58 PM.   Roberta Tran CPhT   7/22/2021 2:58 PM

## 2021-07-22 NOTE — ED NOTES
Pt to floor via cart with RT on Bipap. Pt respirations even and unlabored and NAD noted.       Amalia Devlin RN  07/22/21 [id number]

## 2021-07-22 NOTE — ED NOTES
Report received from [facility]. Care resumed of pt at this time.       Amalia Devlin, RN  07/22/21 [id number]

## 2021-07-22 NOTE — ED PROVIDER NOTES
EMERGENCY DEPARTMENT ENCOUNTER      CHIEF COMPLAINT    Chief Complaint   Patient presents with    Shortness of Breath     Lung cancer and COPD       HPI    Satnam Mehta is a 76 y.o. male with history significant for COPD on 2 L home oxygen lung cancer last chemo 6 months ago but still having active cancer who presents with shortness of breath. Patient's been having 3 days of shortness of breath blood for last 1 to 2 days been getting worse patient's been using inhaler very frequently without any significant relief does have some worsening cough no productive sputum, denies any leg swelling or any weight gain, exertion makes the shortness of breath worse, or unsure whether he has any orthopnea and has some associated chest tightness but no sharp pain. When patient was picked up by medics he was satting 70% on home oxygen, was placed on 6 L of oxygen with improvement of the O2 sats to the 90s. Denies any fever    No recent echo but NM study in 2016 showed EF of 64%       REVIEW OF SYSTEMS    Constitutional: Denies chills, fatigue, unexpected weight loss or fever. HENT: Denies sore throat or rhinorrhea. Eyes: Denies vision changes. Respiratory: +shortness of breath or cough. Cardiovascular: Chest tightness no leg swelling palpitation  Gastrointestinal: Denies abdominal pain, diarrhea, nausea and vomiting. Genitourinary: Denies dysuria or hematuria. Skin: Denies rashes or wounds. MSK: Denies joint pain. Neurologic: Denies headache, lightheadedness, numbness, or weakness.    Hematologic/lymphatic: Denies unexpected weight loss, night sweats  Endocrine: No polyuria, polydipsia, or polyphagia      Pertinent positives and negatives are delineated in HPI and ROS above, all other systems are reviewed and are negative    PAST MEDICAL HISTORY    Past Medical History:   Diagnosis Date    Atherosclerosis of aortic arch ([facility] [address]     NOTED ON CXR 1/2017- CONSIDER CARDIO CONSULT    Atypical chest pain 80-4.5 MCG/ACT AERO Inhale 2 puffs into the lungs 2 times daily 1 Inhaler 3    levothyroxine (SYNTHROID) 25 MCG tablet TAKE 1 TABLET BY MOUTH EVERY DAY 30 tablet 5    albuterol sulfate HFA (PROAIR HFA) 108 (90 Base) MCG/ACT inhaler Inhale 2 puffs into the lungs every 6 hours as needed for Wheezing or Shortness of Breath 1 Inhaler 2    metoprolol tartrate (LOPRESSOR) 25 MG tablet Take 0.5 tablets by mouth 2 times daily 90 tablet 1    atorvastatin (LIPITOR) 10 MG tablet Take 1 tablet by mouth daily 90 tablet 1    Ipratropium-Albuterol (ALBUTEROL-IPRATROPIUM IN) Inhale 2 puffs into the lungs 4 times daily as needed      tiotropium (SPIRIVA RESPIMAT) 2.5 MCG/ACT AERS inhaler Inhale 2 puffs into the lungs daily 1 Inhaler 3    OXYGEN Inhale into the lungs nightly      Multiple Vitamins-Minerals (PRESERVISION AREDS PO) Take by mouth daily       Medication is reviewed    ALLERGIES    No Known Allergies  Allergy is reviewed    FAMILY HISTORY    Family History   Problem Relation Age of Onset    Cirrhosis Mother     Lung Cancer Father     Cancer Father         brain cancer     Family history reviewed and no pertinent family history other than the ones mentioned above    SOCIAL HISTORY    Social History     Socioeconomic History    Marital status:      Spouse name: Loren Heading Number of children: Not on file    Years of education: Not on file    Highest education level: Not on file   Occupational History    Occupation: retired   Tobacco Use    Smoking status: Current Every Day Smoker     Packs/day: 1.00     Years: 60.00     Pack years: 60.00     Types: Cigarettes    Smokeless tobacco: Never Used   Vaping Use    Vaping Use: Former   Substance and Sexual Activity    Alcohol use:  Yes     Alcohol/week: 20.0 standard drinks     Types: 20 Cans of beer per week     Comment: average\"3 beers per day\" per pt on 12/6/2018    Drug use: No    Sexual activity: Not on file   Other Topics Concern    Not on file Social History Narrative    Not on file     Social Determinants of Health     Financial Resource Strain:     Difficulty of Paying Living Expenses:    Food Insecurity:     Worried About Running Out of Food in the Last Year:     [address] in the Last Year:    Transportation Needs:     Lack of Transportation (Medical):  Lack of Transportation (Non-Medical):    Physical Activity:     Days of Exercise per Week:     Minutes of Exercise per Session:    Stress:     Feeling of Stress :    Social Connections:     Frequency of Communication with Friends and Family:     Frequency of Social Gatherings with Friends and Family:     Attends Voodoo Services:     Active Member of Clubs or Organizations:     Attends Club or Organization Meetings:     Marital Status:    Intimate Partner Violence:     Fear of Current or Ex-Partner:     Emotionally Abused:     Physically Abused:     Sexually Abused:      Live with himself  Alcohol and recreational drug use: denies  Social history reviewed and no pertinent social history other than the ones mentioned above    PHYSICAL EXAM    Vital Signs:BP (!) 230/115   Pulse 108   Temp 98.5 °F (36.9 °C) (Axillary)   Resp 26   Ht 5' 7\" (1.702 m)   Wt 117 lb (53.1 kg)   SpO2 95%   BMI 18.32 kg/m²   I have reviewed the triage vital signs. Constitutional: Uncomfortable appearing, in respiratory distress  Eyes: PERRL, no conjunctival injection  HENT: NCAT, Neck supple without meningismus   CV: Tachycardic, Warm, no edema  RESP: Tachypneic with increased respiratory work with accessory muscle use, bilateral diffuse wheezing and also mild crackles appreciated  GI: soft, non-distended, nontender  MSK: No gross deformities  Skin: Warm, dry. No rashes  Neuro: Alert, CNs II-XII grossly intact. Moving all 4 extremities  Psych: Appropriate mood and affect.     Labs:   Labs Reviewed   CBC WITH AUTO DIFFERENTIAL   BASIC METABOLIC PANEL W/ REFLEX TO MG FOR LOW K   HEPATIC PTX, pleural effusion, bronchiectasis, airway obstruction, reactive airway disease exacerbation (asthma/COPD/WARI), anaphylaxis/anaphylactoid reaction/allergic reaction      Workup includes but not limited to: Labs, x-ray, bedside ultrasound, EKG    Treatment includes but not limited to: BiPAP, aerosols, steroids, nitroglycerin drip    Critical care time:   Critical Care  I provided 70 minutes of critical care time including initial history and exam, development of the treatment plan, order management, review of treatment plan with nursing, frequent reevaluation of cardiopulmonary status, discussion with consultants and family, arranging admission, review of the medical record. Total number of minutes spent in direct care of this critically ill patients excludes separately reportable procedures. I did also discuss patient's CODE STATUS with him patient will opt for full CODE STATUS    Impression:   1. Hypoxia  2. SOB  3. Hypertensive emergency  4. COPD exacerbation vs. Flash pulmonary edema    Disposition: admission    This note dictated using Dragon medical voice recognition software. Attempts at proofreading were made, but errors may occasionally still occur. Kirsten Arriaza MD  07/22/21 1314       Kirsten Arriaza MD  07/22/21 1327       Kirsten Arriaza MD  07/22/21 1329    Chest Tube Placement Procedure Note    Indication: pneumothorax    Consent: The patient provided verbal consent for this procedure. Pre-Medication: fentanyl intravenously    Procedure: The patient was placed in a semirecumbent position with the head of the bed at 30 degrees. The right side was prepped with chlorhexidine. Local anesthesia over the insertion site was obtained by infiltration using 1% Lidocaine with epinephrine. An incision was made laterally in the midaxillary line. Blunt dissection up and over the rib was performed until access was obtained into the pleural cavity.   A 24 Slovenian chest tube was placed and connected to a pleurivac at 20 cm H2O. Initial output from the tube was air. The tube was sutured in place and the site was covered with an occlusive dressing. All connections were banded. Breath sounds after the procedure were improved. A chest x-ray was obtained to evaluate placement which showed good tube position. The patient tolerated the procedure well.     Complications: None           Hilario Xavier MD  07/22/21 9705       Hilario Xavier MD  07/22/21 8798

## 2021-07-22 NOTE — ED NOTES
Dr. Nadia Cooper at bedside for chest tube placement. Verbal consent obtained from pt.       Ji Walters, RN  07/22/21 Ronny Reddy RN  07/22/21 [id number]

## 2021-07-22 NOTE — ED NOTES
Report called to Cecilio Gonzalez RN on ICU. All questions answered.       Wan Kraft RN  07/22/21 1315

## 2021-07-22 NOTE — H&P
Hospital Medicine History & Physical      PCP: Bethany Gallardo MD    Date of Admission: 7/22/2021    Date of Service: Pt seen/examined on 7/22/21 and Admitted to Inpatient with expected LOS greater than two midnights due to medical therapy. Chief Complaint:   Shortness of Breath (Lung cancer and COPD)         History Of Present Illness:      76 y.o. male history of COPD, lung cancer, anal cancer who presented with worsening shortness of breath. The patient states for the last 3 days he has had progressive worsening dyspnea. He uses 2 L at home. He states he had hot and cold spells today. He denies any chest pain. He decided to come to the ER for further evaluation. The patient has history of squamous cell lung cancer. He is not candidate for surgery. He underwent radiation therapy. He has been under surveillance with oncology with serial imaging. The patient has known radiation fibrosis of right lung. In ER, presenting BP was 230/115 and he was tachycardic 108 bpm.  He was hypoxic requiring 6 L. He was then transition to the BiPAP due to tachypnea. Patient is placed on nitroglycerin drip for hypertension. Lab work is pertinent for BNP 1237, Trop 0.023, WBC 11. 9.UA pertinent for proteinuria. Chest x-ray showed moderate right pneumothorax. ER physician emergently placed right lung chest tube. Repeat chest x-ray shows improvement of pneumothorax. Patient remains on BiPAP but work of breathing is improved.     Past Medical History:          Diagnosis Date    Atherosclerosis of aortic arch ([address]     NOTED ON CXR 1/2017- CONSIDER CARDIO CONSULT    Atypical chest pain     COPD (chronic obstructive pulmonary disease) ([address]     follow with Dr Caitlin couch ineffective    Essential hypertension 01/29/2018    Full dentures     History of external beam radiation therapy 2009/2019    pelvis/anus in 2009 and right lower lobe lung mass SBRT in 2019 per Dr. Yesenia Jordan [facility]    Hyperlipidemia, mixed     Hypertension     Hypothyroidism due to acquired atrophy of thyroid 05/11/2021    ANTIPEROXIDASE ANTIBODY NEGATIVE    On home oxygen therapy     2l/nc at night and prn    PAD (peripheral artery disease) (HCC)     R leg on doppler 8/2017-- referring to Dr Saqib Mckeon Primary lung squamous cell carcinoma, right ([address] 12/2018    Dr Gael Loaiza, Dr Audie Workman, Dr Velma Fink- treated w RT,    Pulmonary nodule, left 07/2018    Rectal cancer [facility]) 2009    Dr Hershel Halsted oncology, seen by GI at [facility]- Dr Sasha Corcoran- tx with chemo and radiation     S/P colonoscopy 02/01/2017    Dr Jason Stubbs at [facility]-  diverticulosis and angioectasis, being referred to surgeon--CONSIDER RECHECK 11 YRS    SCC (squamous cell carcinoma of lung), right [facility])     Dr Gael Loaiza, periodically Dr Velma Fink.  had resection w Dr Saqib Mckeon Subclavian artery stenosis, right [facility])    "[employer] Wears glasses        Past Surgical History:          Procedure Laterality Date    ANGIOPLASTY Bilateral 2017    Dr Justo Sanabria for bilat leg PVD   Camronilymahendra Danitza Left 2018    Dr Jose Alejandro Valentin  2009    Dr Florian Arrington then needed revision at [facility] 2017   [facility]  2009    LUNG BIOPSY  12/07/2018    LYMPH NODE BIOPSY  07/2009    right axilla    TONSILLECTOMY  age 5       Medications Prior to Admission:      Prior to Admission medications    Medication Sig Start Date End Date Taking?  Authorizing Provider   SYMBICORT 80-4.5 MCG/ACT AERO Inhale 2 puffs into the lungs 2 times daily 6/2/21   Reanna Sawant MD   levothyroxine (SYNTHROID) 25 MCG tablet TAKE 1 TABLET BY MOUTH EVERY DAY 6/2/21   Reanna Sawant MD   albuterol sulfate HFA (PROAIR HFA) 108 (90 Base) MCG/ACT inhaler Inhale 2 puffs into the lungs every 6 hours as needed for Wheezing or Shortness of Breath 5/10/21   Reanna Sawant MD   metoprolol tartrate (LOPRESSOR) 25 MG tablet Take 0.5 tablets by mouth 2 times daily 5/10/21 improved. -Patient has proteinuria on UA. Check prot/cr ratio. Squamous Cell Carcinoma of lung  -Not candidate for surgery per CT surgery. s/p radiation treatment. He is under surveillance with Oncology. Hx of anal canal squamous cell carcinoma  -status post concurrent chemoradiation therapy    Hx PVD s/p angioplasty  -Continue statin     Hx hypothyroidism  -Continue synthroid    Daughter at bedside. Questions and concerns were addressed. DVT Prophylaxis: Lovenox  Diet: No diet orders on file  Code Status: No Order    Code Status: No Order    Surrogate decision maker confirmed with patient:   Extended Emergency Contact Information  Primary Emergency Contact: Milton Washington [address] Phone: [phone]  Mobile Phone: [phone]  Relation: Child    PT/OT Eval Status: as needed    Dispo - ICU admission       Carlton Vanessa MD    Thank you Dennys Rae MD for the opportunity to be involved in this patient's care. If you have any questions or concerns please feel free to contact me at [phone].

## 2021-07-23 ENCOUNTER — APPOINTMENT (OUTPATIENT)
Dept: GENERAL RADIOLOGY | Age: 76
DRG: 199 | End: 2021-07-23
Payer: MEDICARE

## 2021-07-23 LAB
ANION GAP SERPL CALCULATED.3IONS-SCNC: 12 MMOL/L (ref 4–16)
BUN BLDV-MCNC: 21 MG/DL (ref 6–23)
CALCIUM SERPL-MCNC: 8.9 MG/DL (ref 8.3–10.6)
CHLORIDE BLD-SCNC: 98 MMOL/L (ref 99–110)
CO2: 25 MMOL/L (ref 21–32)
CREAT SERPL-MCNC: 0.7 MG/DL (ref 0.9–1.3)
CREATININE URINE: 66 MG/DL (ref 39–259)
GFR AFRICAN AMERICAN: >60 ML/MIN/1.73M2
GFR NON-AFRICAN AMERICAN: >60 ML/MIN/1.73M2
GLUCOSE BLD-MCNC: 150 MG/DL (ref 70–99)
HCT VFR BLD CALC: 41.1 % (ref 42–52)
HEMOGLOBIN: 13.4 GM/DL (ref 13.5–18)
LV EF: 55 %
LVEF MODALITY: NORMAL
MCH RBC QN AUTO: 32.3 PG (ref 27–31)
MCHC RBC AUTO-ENTMCNC: 32.6 % (ref 32–36)
MCV RBC AUTO: 99 FL (ref 78–100)
PDW BLD-RTO: 13.5 % (ref 11.7–14.9)
PLATELET # BLD: 213 K/CU MM (ref 140–440)
PMV BLD AUTO: 9.4 FL (ref 7.5–11.1)
POTASSIUM SERPL-SCNC: 4.6 MMOL/L (ref 3.5–5.1)
PROT/CREAT RATIO, UR: 0.3
RBC # BLD: 4.15 M/CU MM (ref 4.6–6.2)
SODIUM BLD-SCNC: 135 MMOL/L (ref 135–145)
URINE TOTAL PROTEIN: 22.5 MG/DL
WBC # BLD: 9 K/CU MM (ref 4–10.5)

## 2021-07-23 PROCEDURE — 71045 X-RAY EXAM CHEST 1 VIEW: CPT

## 2021-07-23 PROCEDURE — 6370000000 HC RX 637 (ALT 250 FOR IP): Performed by: INTERNAL MEDICINE

## 2021-07-23 PROCEDURE — 93306 TTE W/DOPPLER COMPLETE: CPT

## 2021-07-23 PROCEDURE — 2580000003 HC RX 258: Performed by: INTERNAL MEDICINE

## 2021-07-23 PROCEDURE — 84156 ASSAY OF PROTEIN URINE: CPT

## 2021-07-23 PROCEDURE — 2700000000 HC OXYGEN THERAPY PER DAY

## 2021-07-23 PROCEDURE — 2000000000 HC ICU R&B

## 2021-07-23 PROCEDURE — 82570 ASSAY OF URINE CREATININE: CPT

## 2021-07-23 PROCEDURE — 99222 1ST HOSP IP/OBS MODERATE 55: CPT | Performed by: INTERNAL MEDICINE

## 2021-07-23 PROCEDURE — 80048 BASIC METABOLIC PNL TOTAL CA: CPT

## 2021-07-23 PROCEDURE — 6360000002 HC RX W HCPCS: Performed by: NURSE PRACTITIONER

## 2021-07-23 PROCEDURE — 6370000000 HC RX 637 (ALT 250 FOR IP): Performed by: STUDENT IN AN ORGANIZED HEALTH CARE EDUCATION/TRAINING PROGRAM

## 2021-07-23 PROCEDURE — 85027 COMPLETE CBC AUTOMATED: CPT

## 2021-07-23 PROCEDURE — 6360000002 HC RX W HCPCS: Performed by: INTERNAL MEDICINE

## 2021-07-23 PROCEDURE — 94761 N-INVAS EAR/PLS OXIMETRY MLT: CPT

## 2021-07-23 PROCEDURE — 94640 AIRWAY INHALATION TREATMENT: CPT

## 2021-07-23 RX ORDER — ALBUTEROL SULFATE 90 UG/1
2 AEROSOL, METERED RESPIRATORY (INHALATION) 4 TIMES DAILY
Status: DISCONTINUED | OUTPATIENT
Start: 2021-07-23 | End: 2021-07-26 | Stop reason: HOSPADM

## 2021-07-23 RX ADMIN — SODIUM CHLORIDE, PRESERVATIVE FREE 10 ML: 5 INJECTION INTRAVENOUS at 10:09

## 2021-07-23 RX ADMIN — ALBUTEROL SULFATE 2 PUFF: 90 AEROSOL, METERED RESPIRATORY (INHALATION) at 11:27

## 2021-07-23 RX ADMIN — BUDESONIDE AND FORMOTEROL FUMARATE DIHYDRATE 2 PUFF: 80; 4.5 AEROSOL RESPIRATORY (INHALATION) at 19:59

## 2021-07-23 RX ADMIN — METHYLPREDNISOLONE SODIUM SUCCINATE 40 MG: 125 INJECTION, POWDER, LYOPHILIZED, FOR SOLUTION INTRAMUSCULAR; INTRAVENOUS at 08:55

## 2021-07-23 RX ADMIN — MORPHINE SULFATE 2 MG: 2 INJECTION, SOLUTION INTRAMUSCULAR; INTRAVENOUS at 13:39

## 2021-07-23 RX ADMIN — ENOXAPARIN SODIUM 30 MG: 30 INJECTION SUBCUTANEOUS at 08:55

## 2021-07-23 RX ADMIN — ATORVASTATIN CALCIUM 10 MG: 10 TABLET, FILM COATED ORAL at 21:01

## 2021-07-23 RX ADMIN — Medication 2 PUFF: at 11:29

## 2021-07-23 RX ADMIN — SODIUM CHLORIDE, PRESERVATIVE FREE 10 ML: 5 INJECTION INTRAVENOUS at 22:37

## 2021-07-23 RX ADMIN — MORPHINE SULFATE 2 MG: 2 INJECTION, SOLUTION INTRAMUSCULAR; INTRAVENOUS at 08:55

## 2021-07-23 RX ADMIN — ALBUTEROL SULFATE 2 PUFF: 90 AEROSOL, METERED RESPIRATORY (INHALATION) at 15:40

## 2021-07-23 RX ADMIN — MORPHINE SULFATE 2 MG: 2 INJECTION, SOLUTION INTRAMUSCULAR; INTRAVENOUS at 22:36

## 2021-07-23 RX ADMIN — LEVOTHYROXINE SODIUM 25 MCG: 25 TABLET ORAL at 06:56

## 2021-07-23 RX ADMIN — METOPROLOL TARTRATE 12.5 MG: 25 TABLET, FILM COATED ORAL at 08:53

## 2021-07-23 RX ADMIN — ALBUTEROL SULFATE 2 PUFF: 90 AEROSOL, METERED RESPIRATORY (INHALATION) at 08:06

## 2021-07-23 RX ADMIN — MORPHINE SULFATE 2 MG: 2 INJECTION, SOLUTION INTRAMUSCULAR; INTRAVENOUS at 02:50

## 2021-07-23 RX ADMIN — BUDESONIDE AND FORMOTEROL FUMARATE DIHYDRATE 2 PUFF: 80; 4.5 AEROSOL RESPIRATORY (INHALATION) at 08:10

## 2021-07-23 RX ADMIN — METOPROLOL TARTRATE 12.5 MG: 25 TABLET, FILM COATED ORAL at 21:01

## 2021-07-23 RX ADMIN — ALBUTEROL SULFATE 2 PUFF: 90 AEROSOL, METERED RESPIRATORY (INHALATION) at 19:59

## 2021-07-23 RX ADMIN — METHYLPREDNISOLONE SODIUM SUCCINATE 40 MG: 125 INJECTION, POWDER, LYOPHILIZED, FOR SOLUTION INTRAMUSCULAR; INTRAVENOUS at 21:02

## 2021-07-23 RX ADMIN — MORPHINE SULFATE 2 MG: 2 INJECTION, SOLUTION INTRAMUSCULAR; INTRAVENOUS at 18:11

## 2021-07-23 RX ADMIN — SODIUM CHLORIDE, PRESERVATIVE FREE 10 ML: 5 INJECTION INTRAVENOUS at 21:02

## 2021-07-23 RX ADMIN — Medication 2 PUFF: at 15:42

## 2021-07-23 RX ADMIN — Medication 2 PUFF: at 08:08

## 2021-07-23 ASSESSMENT — PAIN DESCRIPTION - DESCRIPTORS: DESCRIPTORS: ACHING

## 2021-07-23 ASSESSMENT — PAIN DESCRIPTION - ORIENTATION
ORIENTATION: RIGHT
ORIENTATION: RIGHT

## 2021-07-23 ASSESSMENT — PAIN DESCRIPTION - PAIN TYPE
TYPE: SURGICAL PAIN
TYPE: ACUTE PAIN

## 2021-07-23 ASSESSMENT — PAIN DESCRIPTION - LOCATION
LOCATION: RIB CAGE
LOCATION: RIB CAGE

## 2021-07-23 ASSESSMENT — PAIN SCALES - GENERAL
PAINLEVEL_OUTOF10: 5
PAINLEVEL_OUTOF10: 7
PAINLEVEL_OUTOF10: 8
PAINLEVEL_OUTOF10: 0
PAINLEVEL_OUTOF10: 3
PAINLEVEL_OUTOF10: 0
PAINLEVEL_OUTOF10: 7

## 2021-07-23 ASSESSMENT — PAIN DESCRIPTION - FREQUENCY: FREQUENCY: INTERMITTENT

## 2021-07-23 NOTE — PROGRESS NOTES
CT assessed, tubing appears to be kinked off as CT suction tubing is taped going up,  Dressing removed by Jyoti Allen RN and redressed to allow tubing to go to gravity. RN to continue to monitor.

## 2021-07-23 NOTE — PLAN OF CARE
Problem: Falls - Risk of:  Goal: Will remain free from falls  Description: Will remain free from falls  7/23/2021 1011 by Janel Mayberry RN  Outcome: Ongoing  7/23/2021 0538 by Juanita Mena RN  Outcome: Ongoing  Goal: Absence of physical injury  Description: Absence of physical injury  7/23/2021 1011 by Janel Mayberry RN  Outcome: Ongoing  7/23/2021 0538 by Juanita Mena RN  Outcome: Ongoing     Problem: Skin Integrity:  Goal: Will show no infection signs and symptoms  Description: Will show no infection signs and symptoms  7/23/2021 1011 by Janel Mayberry RN  Outcome: Ongoing  7/23/2021 0538 by Juanita Mena RN  Outcome: Ongoing  Goal: Absence of new skin breakdown  Description: Absence of new skin breakdown  7/23/2021 1011 by Janel Mayberry RN  Outcome: Ongoing  7/23/2021 0538 by Juanita Mena RN  Outcome: Ongoing     Problem: Pain:  Goal: Pain level will decrease  Description: Pain level will decrease  7/23/2021 1011 by Janel Mayberry RN  Outcome: Ongoing  7/23/2021 0538 by Juanita Mena RN  Outcome: Ongoing  Goal: Control of acute pain  Description: Control of acute pain  7/23/2021 1011 by Janel Mayberry RN  Outcome: Ongoing  7/23/2021 0538 by Juanita Mena RN  Outcome: Ongoing  Goal: Control of chronic pain  Description: Control of chronic pain  7/23/2021 1011 by Janel Mayberry RN  Outcome: Ongoing  7/23/2021 0538 by Juanita Mena RN  Outcome: Ongoing     Problem: Gas Exchange - Impaired:  Goal: Levels of oxygenation will improve  Description: Levels of oxygenation will improve  7/23/2021 1011 by Janel Mayberry RN  Outcome: Ongoing  7/23/2021 0538 by Juanita Mena RN  Outcome: Ongoing     Problem: Discharge Planning:  Goal: Discharged to appropriate level of care  Description: Discharged to appropriate level of care  7/23/2021 1011 by Janel Mayberry RN  Outcome: Ongoing  7/23/2021 0538 by Juanita Mena RN  Outcome: Ongoing

## 2021-07-23 NOTE — PROGRESS NOTES
Comprehensive Nutrition Assessment    Type and Reason for Visit:  Initial, Positive Nutrition Screen (lower BMI for age)    Nutrition Recommendations/Plan:   · Continue current diet  · Begin standard/frozen oral nutrition supplement tid, between meals    Nutrition Assessment:  Pt admitted with SOB. H/O COPD, lung cancer, anal cancer, s/p chemo/radiation. Adequate po intake so far here on bipap, feeding self and consuming 76% to all of Cardiac Diet. No recent wt loss noted/reported. Pt is n/a during my room visit today. Will order oral supplement to optimize po intake with current and chronic illness and continue to follow as moderate nutrition risk. Malnutrition Assessment:  Malnutrition Status: At risk for malnutrition  Context:  Chronic Illness       Estimated Daily Nutrient Needs:  Energy (kcal):  0667-3422 (30-35 kcal/kg); Weight Used for Energy Requirements:  Current     Protein (g):  68-80 (1.2-1.4 g/kg); Weight Used for Protein Requirements:  Current        Fluid (ml/day):  6838-3231; Method Used for Fluid Requirements:  1 ml/kcal      Nutrition Related Findings:  Glu 150      Wounds:   (chest tube)       Current Nutrition Therapies:    ADULT DIET;  Regular; Low Fat/Low Chol/High Fiber/2 gm Na    Anthropometric Measures:  · Height: 5' 7\" (170.2 cm)  · Current Body Weight: 124 lb 12.5 oz (56.6 kg)   · Admission Body Weight: 124 lb 12.5 oz (56.6 kg)    · Usual Body Weight: 121 lb (54.9 kg) (11/9/20, stated?)     · Ideal Body Weight: 148 lbs; % Ideal Body Weight 84.3 %   · BMI: 19.5  · BMI Categories: Underweight (BMI less than 22) age over 72       Nutrition Diagnosis:   · Predicted inadequate energy intake related to catabolic illness, impaired respiratory function as evidenced by BMI    Nutrition Interventions:   Food and/or Nutrient Delivery:  Continue Current Diet, Start Oral Nutrition Supplement  Nutrition Education/Counseling:  No recommendation at this time   Coordination of Nutrition Care: Continue to monitor while inpatient    Goals:  Pt will consume at least 75% of his meals and supplements       Nutrition Monitoring and Evaluation:   Behavioral-Environmental Outcomes:  None Identified   Food/Nutrient Intake Outcomes:  Food and Nutrient Intake, Supplement Intake  Physical Signs/Symptoms Outcomes:  Biochemical Data, GI Status, Weight     Discharge Planning:    Continue Oral Nutrition Supplement     Electronically signed by Daniel Hart RD, LD on 7/23/21 at 12:28 PM EDT    Contact: [phone]

## 2021-07-23 NOTE — PLAN OF CARE
Nutrition Problem #1: Predicted inadequate energy intake  Intervention: Food and/or Nutrient Delivery: Continue Current Diet, Start Oral Nutrition Supplement  Nutritional Goals: Pt will consume at least 75% of his meals and supplements

## 2021-07-23 NOTE — CARE COORDINATION
Attempted to meet with patient; he has a guest and defers conversation at this time. CM will follow. Ramon Bradford RN     Chart reviewed. Patient is from home; appears independent PTA. He has a PCP and insurance that assists with Rx when needed. No needs identified at this time. CM will remain available should needs arise.  Ramon Bradford RN

## 2021-07-23 NOTE — PLAN OF CARE
Problem: Falls - Risk of:  Goal: Will remain free from falls  Description: Will remain free from falls  Outcome: Ongoing  Goal: Absence of physical injury  Description: Absence of physical injury  Outcome: Ongoing     Problem: Skin Integrity:  Goal: Will show no infection signs and symptoms  Description: Will show no infection signs and symptoms  Outcome: Ongoing  Goal: Absence of new skin breakdown  Description: Absence of new skin breakdown  Outcome: Ongoing     Problem: Pain:  Description: Pain management should include both nonpharmacologic and pharmacologic interventions.   Goal: Pain level will decrease  Description: Pain level will decrease  Outcome: Ongoing  Goal: Control of acute pain  Description: Control of acute pain  Outcome: Ongoing  Goal: Control of chronic pain  Description: Control of chronic pain  Outcome: Ongoing     Problem: Gas Exchange - Impaired:  Goal: Levels of oxygenation will improve  Description: Levels of oxygenation will improve  Outcome: Ongoing     Problem: Discharge Planning:  Goal: Discharged to appropriate level of care  Description: Discharged to appropriate level of care  Outcome: Ongoing

## 2021-07-23 NOTE — CONSULTS
distress. He had no trauma to the right chest, no fall.      Past Medical History:      Diagnosis Date    Atherosclerosis of aortic arch ([facility]     NOTED ON CXR 1/2017- CONSIDER CARDIO CONSULT    Atypical chest pain     COPD (chronic obstructive pulmonary disease) ([facility]     follow with Dr Raul Elias- chantix ineffective    Essential hypertension 01/29/2018    Full dentures     History of external beam radiation therapy 2009/2019    pelvis/anus in 2009 and right lower lobe lung mass SBRT in 2019 per Dr. Ramey Or at [facility] Hyperlipidemia, mixed     Hypertension     Hypothyroidism due to acquired atrophy of thyroid 05/11/2021    ANTIPEROXIDASE ANTIBODY NEGATIVE    On home oxygen therapy     2l/nc at night and prn    PAD (peripheral artery disease) ([facility] [facility]     R leg on doppler 8/2017-- referring to Dr Charmaine Fitzpatrick Primary lung squamous cell carcinoma, right [facility] 12/2018    Dr Mira Gibson, Dr Raul Elias, Dr Ramey Or- treated w RT,    Pulmonary nodule, left 07/2018    Rectal cancer [facility]) 2009    Dr Franks [facility] oncology, seen by GI at [facility]- Dr Alisson Sapp- tx with chemo and radiation     S/P colonoscopy 02/01/2017    Dr Marlow Sites at [facility]-  diverticulosis and angioectasis, being referred to surgeon--CONSIDER RECHECK 11 YRS    SCC (squamous cell carcinoma of lung), right [facility])     Dr Mira Gibson, periodically Dr Ramey Or.  had resection w Dr Charmaine Fitzpatrick Subclavian artery stenosis, right [facility])    [address] Wears glasses       Past Surgical History:        Procedure Laterality Date    ANGIOPLASTY Bilateral 2017    Dr Tha Willams for bilat leg PVD   Estela Trujillo Left 2018    Dr Lito Ross  2009    Dr Emily Rodriguez [facility]   [address]  2009    LUNG BIOPSY  12/07/2018    LYMPH NODE BIOPSY  07/2009    right axilla    TONSILLECTOMY  age 11     Current Medications:     albuterol sulfate HFA  2 puff Inhalation 4x daily    ipratropium  2 puff Inhalation 4x daily    atorvastatin  10 mg Oral Daily    levothyroxine  25 mcg Oral Daily    metoprolol tartrate  12.5 mg Oral BID    budesonide-formoterol  2 puff Inhalation BID    sodium chloride flush  5-40 mL Intravenous 2 times per day    enoxaparin  30 mg Subcutaneous Daily    methylPREDNISolone  40 mg Intravenous Q12H    nicotine  1 patch Transdermal Daily     Allergies:    Social History:    TOBACCO:   reports that he has been smoking cigarettes. He has a 60.00 pack-year smoking history. He has never used smokeless tobacco.  ETOH:   reports current alcohol use of about 20.0 standard drinks of alcohol per week. Patient currently lives independently    Family History:       Problem Relation Age of Onset    Cirrhosis Mother     Lung Cancer Father     Cancer Father         brain cancer       REVIEW OF SYSTEMS:    CONSTITUTIONAL:  negative for fevers, chills, diaphoresis, activity change, appetite change, fatigue, night sweats and unexpected weight change.    EYES:  negative for blurred vision, eye discharge, visual disturbance and icterus  HEENT:  negative for hearing loss, tinnitus, ear drainage, sinus pressure, nasal congestion, epistaxis and snoring  RESPIRATORY:  See HPI  CARDIOVASCULAR:  negative for chest pain, palpitations, exertional chest pressure/discomfort, edema, syncope  GASTROINTESTINAL:  negative for nausea, vomiting, diarrhea, constipation, blood in stool and abdominal pain  GENITOURINARY:  negative for frequency, dysuria, urinary incontinence, decreased urine volume, and hematuria  HEMATOLOGIC/LYMPHATIC:  negative for easy bruising, bleeding and lymphadenopathy  ALLERGIC/IMMUNOLOGIC:  negative for recurrent infections, angioedema, anaphylaxis and drug reactions  ENDOCRINE:  negative for weight changes and diabetic symptoms including polyuria, polydipsia and polyphagia  MUSCULOSKELETAL:  negative for  pain, joint swelling, decreased range of motion and muscle LYMPHSABS 0.6 07/22/2021    EOSABS 0.0 07/22/2021    BASOSABS 0.0 07/22/2021    DIFFTYPE AUTOMATED DIFFERENTIAL 07/22/2021     BMP:    Lab Results   Component Value Date     07/23/2021    K 4.6 07/23/2021    CL 98 07/23/2021    CO2 25 07/23/2021    BUN 21 07/23/2021    CREATININE 0.7 07/23/2021    CALCIUM 8.9 07/23/2021    GFRAA >60 07/23/2021    LABGLOM >60 07/23/2021    GLUCOSE 150 07/23/2021     Hepatic Function Panel:    Lab Results   Component Value Date    ALKPHOS 71 07/22/2021    ALT 27 07/22/2021    AST 24 07/22/2021    PROT 6.9 07/22/2021    PROT 6.6 09/13/2010    BILITOT 0.5 07/22/2021    BILIDIR 0.2 07/22/2021    IBILI 0.3 07/22/2021     ABG:  No results found for: Caldwell Parisian, PHART, THGBART, GCF9ZGG, PO2ART, XVK7ENR    Cultures:   Pending      Radiology Review:      Right chest tube placement with near complete resolution of the right   pneumothorax.     07/23/21    COPD.       No cardiomegaly, pneumonia or interstitial edema.       1 right-sided chest tube was noted without pneumothorax         Assessment/Plan       Patient Active Problem List    Diagnosis Date Noted    Acute respiratory failure with hypoxia ([address] 07/22/2021    Hypothyroidism due to acquired atrophy of thyroid 05/11/2021    SCC (squamous cell carcinoma of lung), right [facility])      Overview Note:     Dr Amrit Brady, Dr Brian Shahid, Dr Cherene Kehr and Dr Libertad Lewis--- NOT candidate for lobectomy.       Malignant neoplasm of lower lobe of right lung ([address] 01/08/2019    Squamous cell lung cancer, right ([address] 12/17/2018    SOB (shortness of breath) on exertion 12/17/2018    Primary lung squamous cell carcinoma, right ([address] 12/01/2018     Overview Note:     Dr Cherene Kehr, Dr Akash Upton Pulmonary nodule, right 08/17/2018    Carotid stenosis, bilateral 08/08/2018    Pulmonary nodule, left 07/30/2018    Subclavian artery stenosis, right ([address] 07/25/2018    Essential hypertension 01/29/2018    PVD (peripheral vascular disease) with claudication ([facility] 07/28/2017    S/P colonoscopy 02/01/2017     Overview Note:     Dr Jason Stubbs at [facility]-  diverticulosis and angioectasis, being referred to surgeon      Hyperlipidemia, mixed     Atherosclerosis of aortic arch ([facility]      Overview Note:     NOTED ON CXR 1/2017      COPD (chronic obstructive pulmonary disease) ([facility]     [facility] 01/01/2009     Overview Note:     Dr Hershel Halsted oncology, seen by GI at [facility]- Dr Jason Stubbs     COPD  Hypoxia  Grade I diastolic dysfunction  Right spontaneous Pneumothorax s/p chest tube with resolution  Squamous cell lung ca  Cachexia  Debility    PLAn  1. Possible clamp CT in am  2. Possible d/c in am  3. Keep sats > 92%  4. Inhalers  5. DVT and GI Prophylaxis  6. CXR in am  7.  C/w present management      Electronically signed by Bryant Hdez MD on 7/23/2021 at 12:00 PM

## 2021-07-23 NOTE — PROGRESS NOTES
This new patient's skin was assessed by 2 RN's Dorothye Sacks and myself. Patient just has scattered bruising and a few scattered scabs. His bottom is red from incontinent episodes at home.

## 2021-07-23 NOTE — PROGRESS NOTES
Hospitalist Progress Note      Name:  Belen Valente /Age/Sex: 1945  (76 y.o. male)   MRN & CSN:  [id number] & [id number] Admission Date/Time: 2021 12:49 PM   Location:  -A PCP: Martita Aranda MD         Hospital Day: 2    Assessment and Plan:   Belen Valente is a 76 y.o.  male  who presents with <principal problem not specified>    1. ACUTE/CHRONIC RESPIRATORY FAILURE WITH HYPOXIA  -due to pneumothorax. Currently stable continue supplemental oxygen    2. PNEUMOTHORAX ON RIGHT  -post chest tube, stable. For re evaluation tomorrow    3. ACUTE EXACERBATION COPD  -IV steroids, nebs as needed    4. HYPERTENSIVE URGENCY  -BP currently stable, continue medications        Diet ADULT DIET; Regular; Low Fat/Low Chol/High Fiber/2 gm Na  Adult Oral Nutrition Supplement; Standard High Calorie/High Protein Oral Supplement  Adult Oral Nutrition Supplement; Frozen Oral Supplement   DVT Prophylaxis [x] Lovenox, []  Heparin, [] SCDs, [] Ambulation   GI Prophylaxis [] PPI,  [] H2 Blocker,  [] Carafate,  [] Diet/Tube Feeds   Code Status Full Code   Disposition Patient requires continued admission due to Ptx     MDM [] Low, [] Moderate,[]  High  Patient's risk as above due to Ptx     History of Present Illness:     Chief Complaint: <principal problem not specified>  Belen Valente is a 76 y.o.  male  who presents with Ptx and COPD    Patient is seen in ICU. He is sitting up in bed comfortable. He states he is having some pain with deep breathing, no breathing issues currently       Ten point ROS reviewed negative, unless as noted above    Objective: Intake/Output Summary (Last 24 hours) at 2021 1301  Last data filed at 2021 1200  Gross per 24 hour   Intake 320 ml   Output 995 ml   Net -675 ml      Vitals:   Vitals:    21 1100   BP: 126/62   Pulse: 87   Resp: 14   Temp:    SpO2: 93%     Physical Exam:   GEN Awake male, sitting upright in bed in no apparent distress.  Appears given age. EYES Pupils are equally round. No scleral erythema, discharge, or conjunctivitis. HENT Mucous membranes are moist. Oral pharynx without exudates, no evidence of thrush. NECK Supple, no apparent thyromegaly or masses. RESP Clear to auscultation with bilat decreased BS, no wheezes, rales or rhonchi. Symmetric chest movement  CARDIO/VASC S1/S2 auscultated. Regular rate without appreciable murmurs, rubs, or gallops. No JVD or carotid bruits. Peripheral pulses equal bilaterally and palpable. No peripheral edema. GI Abdomen is soft without significant tenderness, masses, or guarding. Bowel sounds are normoactive. Rectal exam deferred. HEME/LYMPH No palpable cervical lymphadenopathy and no hepatosplenomegaly. No petechiae or ecchymoses. MSK No gross joint deformities. SKIN Normal coloration, warm, dry. NEURO Cranial nerves appear grossly intact, normal speech, no lateralizing weakness. PSYCH Awake, alert, oriented x 4. Affect appropriate.     Medications:   Medications:    albuterol sulfate HFA  2 puff Inhalation 4x daily    ipratropium  2 puff Inhalation 4x daily    atorvastatin  10 mg Oral Daily    levothyroxine  25 mcg Oral Daily    metoprolol tartrate  12.5 mg Oral BID    budesonide-formoterol  2 puff Inhalation BID    sodium chloride flush  5-40 mL Intravenous 2 times per day    enoxaparin  30 mg Subcutaneous Daily    methylPREDNISolone  40 mg Intravenous Q12H    nicotine  1 patch Transdermal Daily      Infusions:    nitroGLYCERIN Stopped (07/22/21 1514)    sodium chloride       PRN Meds: albuterol sulfate HFA, 2 puff, Q6H PRN  sodium chloride flush, 5-40 mL, PRN  sodium chloride, 25 mL, PRN  ondansetron, 4 mg, Q8H PRN   Or  ondansetron, 4 mg, Q6H PRN  polyethylene glycol, 17 g, Daily PRN  acetaminophen, 650 mg, Q6H PRN   Or  acetaminophen, 650 mg, Q6H PRN  potassium chloride, 10 mEq, PRN  magnesium sulfate, 2,000 mg, PRN  hydrALAZINE, 10 mg, Q6H PRN  morphine, 2 mg, Q4H PRN  melatonin, 6 mg, Nightly PRN

## 2021-07-24 ENCOUNTER — APPOINTMENT (OUTPATIENT)
Dept: GENERAL RADIOLOGY | Age: 76
DRG: 199 | End: 2021-07-24
Payer: MEDICARE

## 2021-07-24 PROCEDURE — 2580000003 HC RX 258: Performed by: INTERNAL MEDICINE

## 2021-07-24 PROCEDURE — 2000000000 HC ICU R&B

## 2021-07-24 PROCEDURE — 6370000000 HC RX 637 (ALT 250 FOR IP): Performed by: STUDENT IN AN ORGANIZED HEALTH CARE EDUCATION/TRAINING PROGRAM

## 2021-07-24 PROCEDURE — 94761 N-INVAS EAR/PLS OXIMETRY MLT: CPT

## 2021-07-24 PROCEDURE — 6360000002 HC RX W HCPCS: Performed by: NURSE PRACTITIONER

## 2021-07-24 PROCEDURE — 2700000000 HC OXYGEN THERAPY PER DAY

## 2021-07-24 PROCEDURE — 71045 X-RAY EXAM CHEST 1 VIEW: CPT

## 2021-07-24 PROCEDURE — 6360000002 HC RX W HCPCS: Performed by: INTERNAL MEDICINE

## 2021-07-24 PROCEDURE — 94640 AIRWAY INHALATION TREATMENT: CPT

## 2021-07-24 PROCEDURE — 6370000000 HC RX 637 (ALT 250 FOR IP): Performed by: INTERNAL MEDICINE

## 2021-07-24 RX ADMIN — SODIUM CHLORIDE, PRESERVATIVE FREE 10 ML: 5 INJECTION INTRAVENOUS at 21:22

## 2021-07-24 RX ADMIN — MORPHINE SULFATE 2 MG: 2 INJECTION, SOLUTION INTRAMUSCULAR; INTRAVENOUS at 04:42

## 2021-07-24 RX ADMIN — ENOXAPARIN SODIUM 30 MG: 30 INJECTION SUBCUTANEOUS at 08:07

## 2021-07-24 RX ADMIN — METOPROLOL TARTRATE 12.5 MG: 25 TABLET, FILM COATED ORAL at 21:21

## 2021-07-24 RX ADMIN — Medication 2 PUFF: at 14:59

## 2021-07-24 RX ADMIN — METHYLPREDNISOLONE SODIUM SUCCINATE 40 MG: 125 INJECTION, POWDER, LYOPHILIZED, FOR SOLUTION INTRAMUSCULAR; INTRAVENOUS at 21:22

## 2021-07-24 RX ADMIN — SODIUM CHLORIDE, PRESERVATIVE FREE 10 ML: 5 INJECTION INTRAVENOUS at 08:07

## 2021-07-24 RX ADMIN — MORPHINE SULFATE 2 MG: 2 INJECTION, SOLUTION INTRAMUSCULAR; INTRAVENOUS at 23:20

## 2021-07-24 RX ADMIN — Medication 2 PUFF: at 08:02

## 2021-07-24 RX ADMIN — Medication 2 PUFF: at 11:55

## 2021-07-24 RX ADMIN — METOPROLOL TARTRATE 12.5 MG: 25 TABLET, FILM COATED ORAL at 08:07

## 2021-07-24 RX ADMIN — Medication 2 PUFF: at 20:41

## 2021-07-24 RX ADMIN — ATORVASTATIN CALCIUM 10 MG: 10 TABLET, FILM COATED ORAL at 21:21

## 2021-07-24 RX ADMIN — ALBUTEROL SULFATE 2 PUFF: 90 AEROSOL, METERED RESPIRATORY (INHALATION) at 11:55

## 2021-07-24 RX ADMIN — MORPHINE SULFATE 2 MG: 2 INJECTION, SOLUTION INTRAMUSCULAR; INTRAVENOUS at 18:57

## 2021-07-24 RX ADMIN — MORPHINE SULFATE 2 MG: 2 INJECTION, SOLUTION INTRAMUSCULAR; INTRAVENOUS at 14:23

## 2021-07-24 RX ADMIN — BUDESONIDE AND FORMOTEROL FUMARATE DIHYDRATE 2 PUFF: 80; 4.5 AEROSOL RESPIRATORY (INHALATION) at 08:02

## 2021-07-24 RX ADMIN — BUDESONIDE AND FORMOTEROL FUMARATE DIHYDRATE 2 PUFF: 80; 4.5 AEROSOL RESPIRATORY (INHALATION) at 20:42

## 2021-07-24 RX ADMIN — METHYLPREDNISOLONE SODIUM SUCCINATE 40 MG: 125 INJECTION, POWDER, LYOPHILIZED, FOR SOLUTION INTRAMUSCULAR; INTRAVENOUS at 08:06

## 2021-07-24 RX ADMIN — ALBUTEROL SULFATE 2 PUFF: 90 AEROSOL, METERED RESPIRATORY (INHALATION) at 14:59

## 2021-07-24 RX ADMIN — ALBUTEROL SULFATE 2 PUFF: 90 AEROSOL, METERED RESPIRATORY (INHALATION) at 08:02

## 2021-07-24 RX ADMIN — ALBUTEROL SULFATE 2 PUFF: 90 AEROSOL, METERED RESPIRATORY (INHALATION) at 20:41

## 2021-07-24 RX ADMIN — LEVOTHYROXINE SODIUM 25 MCG: 25 TABLET ORAL at 06:24

## 2021-07-24 RX ADMIN — MORPHINE SULFATE 2 MG: 2 INJECTION, SOLUTION INTRAMUSCULAR; INTRAVENOUS at 09:05

## 2021-07-24 ASSESSMENT — PAIN DESCRIPTION - LOCATION
LOCATION: RIB CAGE

## 2021-07-24 ASSESSMENT — PAIN DESCRIPTION - ORIENTATION
ORIENTATION: RIGHT

## 2021-07-24 ASSESSMENT — PAIN SCALES - GENERAL
PAINLEVEL_OUTOF10: 2
PAINLEVEL_OUTOF10: 0
PAINLEVEL_OUTOF10: 9
PAINLEVEL_OUTOF10: 6
PAINLEVEL_OUTOF10: 0
PAINLEVEL_OUTOF10: 8
PAINLEVEL_OUTOF10: 8
PAINLEVEL_OUTOF10: 5
PAINLEVEL_OUTOF10: 8
PAINLEVEL_OUTOF10: 3
PAINLEVEL_OUTOF10: 8

## 2021-07-24 ASSESSMENT — PAIN DESCRIPTION - PAIN TYPE
TYPE: SURGICAL PAIN

## 2021-07-24 ASSESSMENT — PAIN DESCRIPTION - DESCRIPTORS
DESCRIPTORS: ACHING

## 2021-07-24 ASSESSMENT — PAIN DESCRIPTION - FREQUENCY
FREQUENCY: INTERMITTENT
FREQUENCY: CONTINUOUS
FREQUENCY: INTERMITTENT

## 2021-07-24 NOTE — PROGRESS NOTES
Hospitalist Progress Note      Name:  Lance Caban /Age/Sex: 1945  (76 y.o. male)   MRN & CSN:  [id number] & [id number] Admission Date/Time: 2021 12:49 PM   Location:  -A PCP: Hawa Sommers MD         Hospital Day: 3    Assessment and Plan:   Lance Caban is a 76 y.o.  male  who presents with <principal problem not specified>    1. ACUTE/CHRONIC RESPIRATORY FAILURE WITH HYPOXIA  -due to pneumothorax. Currently stable, as per reports his pulse ox was down, will continue supplemental oxygen     2. PNEUMOTHORAX ON RIGHT  -post chest tube, stable. For re evaluation by pulmonary     3. ACUTE EXACERBATION COPD  -IV steroids, nebs as needed     4. HYPERTENSIVE URGENCY  -resolved will continue medication    Diet ADULT DIET; Regular; Low Fat/Low Chol/High Fiber/2 gm Na  Adult Oral Nutrition Supplement; Standard High Calorie/High Protein Oral Supplement  Adult Oral Nutrition Supplement; Frozen Oral Supplement   DVT Prophylaxis [x] Lovenox, []  Heparin, [] SCDs, [] Ambulation   GI Prophylaxis [] PPI,  [] H2 Blocker,  [] Carafate,  [] Diet/Tube Feeds   Code Status Full Code   Disposition Patient requires continued admission due to Ptx   MDM [] Low, [] Moderate,[]  High  Patient's risk as above due to Ptx     History of Present Illness:     Chief Complaint: <principal problem not specified>  Lance Caban is a 76 y.o.  male  who presents with pneumothorax    No issues overnight. He is sitting up in bed, no dyspnea at this time. He states pain is controllable at this time, he is using incentive spirometer. No complaints       Ten point ROS reviewed negative, unless as noted above    Objective:        Intake/Output Summary (Last 24 hours) at 2021 1258  Last data filed at 2021 1141  Gross per 24 hour   Intake 870.5 ml   Output 1490 ml   Net -619.5 ml      Vitals:   Vitals:    21 1200   BP: 114/61   Pulse: 93   Resp: 16   Temp:    SpO2: (!) 89%     Physical Exam:   GEN Awake male, sitting upright in bed in no apparent distress. Appears given age. EYES Pupils are equally round. No scleral erythema, discharge, or conjunctivitis. HENT Mucous membranes are moist. Oral pharynx without exudates, no evidence of thrush. NECK Supple, no apparent thyromegaly or masses. RESP Scattered rhonchi, no wheezes, rales. Symmetric chest movement  CARDIO/VASC S1/S2 auscultated. Regular rate without appreciable murmurs, rubs, or gallops. No JVD or carotid bruits. Peripheral pulses equal bilaterally and palpable. No peripheral edema. GI Abdomen is soft without significant tenderness, masses, or guarding. Bowel sounds are normoactive. Rectal exam deferred. HEME/LYMPH No palpable cervical lymphadenopathy and no hepatosplenomegaly. No petechiae or ecchymoses. MSK No gross joint deformities. SKIN Normal coloration, warm, dry. NEURO Cranial nerves appear grossly intact, normal speech, no lateralizing weakness. PSYCH Awake, alert, oriented x 4. Affect appropriate.     Medications:   Medications:    albuterol sulfate HFA  2 puff Inhalation 4x daily    ipratropium  2 puff Inhalation 4x daily    atorvastatin  10 mg Oral Daily    levothyroxine  25 mcg Oral Daily    metoprolol tartrate  12.5 mg Oral BID    budesonide-formoterol  2 puff Inhalation BID    sodium chloride flush  5-40 mL Intravenous 2 times per day    enoxaparin  30 mg Subcutaneous Daily    methylPREDNISolone  40 mg Intravenous Q12H    nicotine  1 patch Transdermal Daily      Infusions:    nitroGLYCERIN Stopped (07/22/21 1514)    sodium chloride       PRN Meds: albuterol sulfate HFA, 2 puff, Q6H PRN  sodium chloride flush, 5-40 mL, PRN  sodium chloride, 25 mL, PRN  ondansetron, 4 mg, Q8H PRN   Or  ondansetron, 4 mg, Q6H PRN  polyethylene glycol, 17 g, Daily PRN  acetaminophen, 650 mg, Q6H PRN   Or  acetaminophen, 650 mg, Q6H PRN  potassium chloride, 10 mEq, PRN  magnesium sulfate, 2,000 mg, PRN  hydrALAZINE, 10 mg, Q6H

## 2021-07-24 NOTE — PLAN OF CARE
Problem: Falls - Risk of:  Goal: Will remain free from falls  Description: Will remain free from falls  Outcome: Ongoing  Goal: Absence of physical injury  Description: Absence of physical injury  Outcome: Ongoing     Problem: Skin Integrity:  Goal: Will show no infection signs and symptoms  Description: Will show no infection signs and symptoms  Outcome: Ongoing  Goal: Absence of new skin breakdown  Description: Absence of new skin breakdown  Outcome: Ongoing     Problem: Pain:  Goal: Pain level will decrease  Description: Pain level will decrease  Outcome: Ongoing  Goal: Control of acute pain  Description: Control of acute pain  Outcome: Ongoing  Goal: Control of chronic pain  Description: Control of chronic pain  Outcome: Ongoing     Problem: Gas Exchange - Impaired:  Goal: Levels of oxygenation will improve  Description: Levels of oxygenation will improve  Outcome: Ongoing     Problem: Discharge Planning:  Goal: Discharged to appropriate level of care  Description: Discharged to appropriate level of care  Outcome: Ongoing     Problem: Nutrition  Goal: Optimal nutrition therapy  Outcome: Ongoing

## 2021-07-24 NOTE — PROGRESS NOTES
Assisted patient up to chair,  Bath completed, requiring min-mod assistance,  Complete bed change, patient sat in chair approx 30-45 min and assisted patient back to bed per request. Patient tolerated fairly well.

## 2021-07-24 NOTE — PROGRESS NOTES
pulmonary      SUBJECTIVE: still; sob     OBJECTIVE    VITALS:  /61   Pulse 93   Temp 98.1 °F (36.7 °C) (Oral)   Resp 16   Ht 5' 7\" (1.702 m)   Wt 126 lb 5.2 oz (57.3 kg)   SpO2 (!) 89%   BMI 19.79 kg/m²   HEAD AND FACE EXAM:  No throat injection, no active exudate,no thrush  NECK EXAM;No JVD, no masses, symmetrical  CHEST EXAM; Expansion equal and symmetrical, no masses  LUNG EXAM; Good breath sounds bilaterally. There are expiratory wheezes both lungs, there are crackles at both lung bases  CARDIOVASCULAR EXAM: Positive S1 and S2, no S3 or S4, no clicks ,no murmurs  RIGHT AND LEFT LOWER EXTRIMITY EXAM: No edema, no swelling, no inflamation            LABS   Lab Results   Component Value Date    WBC 9.0 07/23/2021    HGB 13.4 (L) 07/23/2021    HCT 41.1 (L) 07/23/2021    MCV 99.0 07/23/2021     07/23/2021     Lab Results   Component Value Date    CREATININE 0.7 (L) 07/23/2021    BUN 21 07/23/2021     07/23/2021    K 4.6 07/23/2021    CL 98 (L) 07/23/2021    CO2 25 07/23/2021     Lab Results   Component Value Date    INR 0.87 07/22/2021    PROTIME 11.2 (L) 07/22/2021        No results found for: PHOS   No results for input(s): PH, PO2ART, HVC8OBL, HCO3, BEART, O2SAT in the last 72 hours. Wt Readings from Last 3 Encounters:   07/24/21 126 lb 5.2 oz (57.3 kg)   06/08/21 117 lb 12.8 oz (53.4 kg)   05/10/21 119 lb (54 kg)               ASSESMENT  Ac onm ch resp failure  Right ptx  Ac copd        PLAN  1. cpm  2. Will clamp chest tube at midnight  3.  Will do cxsr tomorrow am and if looks ok will remove chest tube    7/24/2021  Nicolette Henson MD, M.D.

## 2021-07-24 NOTE — PROGRESS NOTES
Patient noted to be more short of breath now than previously, states it just started when he \"was startled by the  Nutrition aide\" RN sat with patient, o2 sats 88-89% at this time, encouraged deep breathing. Lung sounds assessed. Patient does have rhonchi, coughs and clears. o2 sat improved slightly to 90-91%, but quickly dropped back down to 88-89%, [place] RT called, she will be here shortly to give breathing treatment as well as incentive spirometer with patient. Patient denies any distress just reports increased shortness of breath. CT is assessed, tipped/tilted, no acute changes noted. RN to continue to monitor.

## 2021-07-25 ENCOUNTER — APPOINTMENT (OUTPATIENT)
Dept: GENERAL RADIOLOGY | Age: 76
DRG: 199 | End: 2021-07-25
Payer: MEDICARE

## 2021-07-25 PROCEDURE — 6360000002 HC RX W HCPCS: Performed by: NURSE PRACTITIONER

## 2021-07-25 PROCEDURE — 6370000000 HC RX 637 (ALT 250 FOR IP): Performed by: INTERNAL MEDICINE

## 2021-07-25 PROCEDURE — 6370000000 HC RX 637 (ALT 250 FOR IP): Performed by: STUDENT IN AN ORGANIZED HEALTH CARE EDUCATION/TRAINING PROGRAM

## 2021-07-25 PROCEDURE — 6360000002 HC RX W HCPCS: Performed by: INTERNAL MEDICINE

## 2021-07-25 PROCEDURE — 2580000003 HC RX 258: Performed by: INTERNAL MEDICINE

## 2021-07-25 PROCEDURE — 94150 VITAL CAPACITY TEST: CPT

## 2021-07-25 PROCEDURE — 2700000000 HC OXYGEN THERAPY PER DAY

## 2021-07-25 PROCEDURE — 6370000000 HC RX 637 (ALT 250 FOR IP): Performed by: FAMILY MEDICINE

## 2021-07-25 PROCEDURE — 2000000000 HC ICU R&B

## 2021-07-25 PROCEDURE — 94660 CPAP INITIATION&MGMT: CPT

## 2021-07-25 PROCEDURE — 94761 N-INVAS EAR/PLS OXIMETRY MLT: CPT

## 2021-07-25 PROCEDURE — 94640 AIRWAY INHALATION TREATMENT: CPT

## 2021-07-25 PROCEDURE — 71045 X-RAY EXAM CHEST 1 VIEW: CPT

## 2021-07-25 RX ORDER — OXYCODONE HYDROCHLORIDE AND ACETAMINOPHEN 5; 325 MG/1; MG/1
1 TABLET ORAL EVERY 4 HOURS PRN
Status: DISCONTINUED | OUTPATIENT
Start: 2021-07-25 | End: 2021-07-26 | Stop reason: HOSPADM

## 2021-07-25 RX ADMIN — ALBUTEROL SULFATE 2 PUFF: 90 AEROSOL, METERED RESPIRATORY (INHALATION) at 16:27

## 2021-07-25 RX ADMIN — METOPROLOL TARTRATE 12.5 MG: 25 TABLET, FILM COATED ORAL at 08:13

## 2021-07-25 RX ADMIN — BUDESONIDE AND FORMOTEROL FUMARATE DIHYDRATE 2 PUFF: 80; 4.5 AEROSOL RESPIRATORY (INHALATION) at 20:01

## 2021-07-25 RX ADMIN — Medication 2 PUFF: at 11:50

## 2021-07-25 RX ADMIN — MORPHINE SULFATE 2 MG: 2 INJECTION, SOLUTION INTRAMUSCULAR; INTRAVENOUS at 08:09

## 2021-07-25 RX ADMIN — ALBUTEROL SULFATE 2 PUFF: 90 AEROSOL, METERED RESPIRATORY (INHALATION) at 19:59

## 2021-07-25 RX ADMIN — OXYCODONE HYDROCHLORIDE AND ACETAMINOPHEN 1 TABLET: 5; 325 TABLET ORAL at 16:00

## 2021-07-25 RX ADMIN — SODIUM CHLORIDE, PRESERVATIVE FREE 10 ML: 5 INJECTION INTRAVENOUS at 08:12

## 2021-07-25 RX ADMIN — Medication 2 PUFF: at 07:32

## 2021-07-25 RX ADMIN — MORPHINE SULFATE 2 MG: 2 INJECTION, SOLUTION INTRAMUSCULAR; INTRAVENOUS at 21:24

## 2021-07-25 RX ADMIN — Medication 2 PUFF: at 20:00

## 2021-07-25 RX ADMIN — ACETAMINOPHEN 650 MG: 325 TABLET ORAL at 08:12

## 2021-07-25 RX ADMIN — METOPROLOL TARTRATE 12.5 MG: 25 TABLET, FILM COATED ORAL at 21:23

## 2021-07-25 RX ADMIN — ALBUTEROL SULFATE 2 PUFF: 90 AEROSOL, METERED RESPIRATORY (INHALATION) at 07:32

## 2021-07-25 RX ADMIN — METHYLPREDNISOLONE SODIUM SUCCINATE 40 MG: 125 INJECTION, POWDER, LYOPHILIZED, FOR SOLUTION INTRAMUSCULAR; INTRAVENOUS at 21:24

## 2021-07-25 RX ADMIN — ONDANSETRON 4 MG: 2 INJECTION INTRAMUSCULAR; INTRAVENOUS at 08:11

## 2021-07-25 RX ADMIN — METHYLPREDNISOLONE SODIUM SUCCINATE 40 MG: 125 INJECTION, POWDER, LYOPHILIZED, FOR SOLUTION INTRAMUSCULAR; INTRAVENOUS at 08:13

## 2021-07-25 RX ADMIN — ALBUTEROL SULFATE 2 PUFF: 90 AEROSOL, METERED RESPIRATORY (INHALATION) at 11:50

## 2021-07-25 RX ADMIN — MORPHINE SULFATE 2 MG: 2 INJECTION, SOLUTION INTRAMUSCULAR; INTRAVENOUS at 12:45

## 2021-07-25 RX ADMIN — SODIUM CHLORIDE, PRESERVATIVE FREE 10 ML: 5 INJECTION INTRAVENOUS at 03:30

## 2021-07-25 RX ADMIN — BUDESONIDE AND FORMOTEROL FUMARATE DIHYDRATE 2 PUFF: 80; 4.5 AEROSOL RESPIRATORY (INHALATION) at 07:33

## 2021-07-25 RX ADMIN — Medication 2 PUFF: at 16:28

## 2021-07-25 RX ADMIN — LEVOTHYROXINE SODIUM 25 MCG: 25 TABLET ORAL at 07:14

## 2021-07-25 RX ADMIN — SODIUM CHLORIDE, PRESERVATIVE FREE 10 ML: 5 INJECTION INTRAVENOUS at 21:24

## 2021-07-25 RX ADMIN — ATORVASTATIN CALCIUM 10 MG: 10 TABLET, FILM COATED ORAL at 21:23

## 2021-07-25 RX ADMIN — MORPHINE SULFATE 2 MG: 2 INJECTION, SOLUTION INTRAMUSCULAR; INTRAVENOUS at 03:30

## 2021-07-25 RX ADMIN — ENOXAPARIN SODIUM 30 MG: 30 INJECTION SUBCUTANEOUS at 08:12

## 2021-07-25 ASSESSMENT — PAIN DESCRIPTION - ORIENTATION
ORIENTATION: RIGHT

## 2021-07-25 ASSESSMENT — PAIN DESCRIPTION - FREQUENCY
FREQUENCY: INTERMITTENT

## 2021-07-25 ASSESSMENT — PAIN DESCRIPTION - DESCRIPTORS
DESCRIPTORS: ACHING

## 2021-07-25 ASSESSMENT — PAIN DESCRIPTION - PAIN TYPE
TYPE: SURGICAL PAIN

## 2021-07-25 ASSESSMENT — PAIN DESCRIPTION - LOCATION
LOCATION: RIB CAGE

## 2021-07-25 ASSESSMENT — PAIN SCALES - GENERAL
PAINLEVEL_OUTOF10: 8
PAINLEVEL_OUTOF10: 0
PAINLEVEL_OUTOF10: 8
PAINLEVEL_OUTOF10: 2
PAINLEVEL_OUTOF10: 10
PAINLEVEL_OUTOF10: 2
PAINLEVEL_OUTOF10: 7
PAINLEVEL_OUTOF10: 8
PAINLEVEL_OUTOF10: 2
PAINLEVEL_OUTOF10: 2
PAINLEVEL_OUTOF10: 8
PAINLEVEL_OUTOF10: 8

## 2021-07-25 NOTE — PROGRESS NOTES
Hospitalist Progress Note      Name:  Kiran Chance /Age/Sex: 1945  (76 y.o. male)   MRN & CSN:  [id number] & [id number] Admission Date/Time: 2021 12:49 PM   Location:  - PCP: Nely Moore MD         Hospital Day: 4    Assessment and Plan:   Kiran Chance is a 76 y.o.  male  who presents with <principal problem not specified>    1. ACUTE/CHRONIC RESPIRATORY FAILURE WITH HYPOXIA  -due to pneumothorax. Resolved. See below     2. PNEUMOTHORAX ON RIGHT  -post chest tube, which was DCed this morning. He is doing well. Will continue to monitor closely and recheck CXR in AM     3. ACUTE EXACERBATION COPD  -IV steroids, nebs as needed     4. HYPERTENSIVE URGENCY  -resolved will continue to monitor BP    Diet ADULT DIET; Regular; Low Fat/Low Chol/High Fiber/2 gm Na  Adult Oral Nutrition Supplement; Standard High Calorie/High Protein Oral Supplement  Adult Oral Nutrition Supplement; Frozen Oral Supplement   DVT Prophylaxis [x] Lovenox, []  Heparin, [] SCDs, [] Ambulation   GI Prophylaxis [] PPI,  [] H2 Blocker,  [] Carafate,  [] Diet/Tube Feeds   Code Status Full Code   Disposition Patient requires continued admission due to PTx   MDM [] Low, [] Moderate,[]  High  Patient's risk as above due to PTX     History of Present Illness:     Chief Complaint: <principal problem not specified>  Kiran Chance is a 76 y.o.  male  who presents with pneumothorax    No issues overnight. Chest tube was removed earlier. He is currently sitting up in chair, he is feeling much better. He has been using the incentive spirometer, he has no specific complaints       Ten point ROS reviewed negative, unless as noted above    Objective:        Intake/Output Summary (Last 24 hours) at 2021 1220  Last data filed at 2021 1049  Gross per 24 hour   Intake 1060 ml   Output 837 ml   Net 223 ml      Vitals:   Vitals:    21 1100   BP: 122/66   Pulse: 83   Resp: 24   Temp:    SpO2: 93%     Physical Exam:   GEN Awake male, sitting upright in bed in no apparent distress. Appears given age. EYES Pupils are equally round. No scleral erythema, discharge, or conjunctivitis. HENT Mucous membranes are moist. Oral pharynx without exudates, no evidence of thrush. NECK Supple, no apparent thyromegaly or masses. RESP Clear with decreased BA, no wheezes, rales or rhonchi. Symmetric chest movement while on NC  CARDIO/VASC S1/S2 auscultated. Regular rate without appreciable murmurs, rubs, or gallops. No JVD or carotid bruits. Peripheral pulses equal bilaterally and palpable. No peripheral edema. GI Abdomen is soft without significant tenderness, masses, or guarding. Bowel sounds are normoactive. Rectal exam deferred. HEME/LYMPH No palpable cervical lymphadenopathy and no hepatosplenomegaly. No petechiae or ecchymoses. MSK No gross joint deformities. SKIN Normal coloration, warm, dry. NEURO Cranial nerves appear grossly intact, normal speech, no lateralizing weakness. PSYCH Awake, alert, oriented x 4. Affect appropriate.     Medications:   Medications:    albuterol sulfate HFA  2 puff Inhalation 4x daily    ipratropium  2 puff Inhalation 4x daily    atorvastatin  10 mg Oral Daily    levothyroxine  25 mcg Oral Daily    metoprolol tartrate  12.5 mg Oral BID    budesonide-formoterol  2 puff Inhalation BID    sodium chloride flush  5-40 mL Intravenous 2 times per day    enoxaparin  30 mg Subcutaneous Daily    methylPREDNISolone  40 mg Intravenous Q12H    nicotine  1 patch Transdermal Daily      Infusions:    nitroGLYCERIN Stopped (07/22/21 1514)    sodium chloride       PRN Meds: albuterol sulfate HFA, 2 puff, Q6H PRN  sodium chloride flush, 5-40 mL, PRN  sodium chloride, 25 mL, PRN  ondansetron, 4 mg, Q8H PRN   Or  ondansetron, 4 mg, Q6H PRN  polyethylene glycol, 17 g, Daily PRN  acetaminophen, 650 mg, Q6H PRN   Or  acetaminophen, 650 mg, Q6H PRN  potassium chloride, 10 mEq, PRN  magnesium sulfate, 2,000 mg, PRN  hydrALAZINE, 10 mg, Q6H PRN  morphine, 2 mg, Q4H PRN  melatonin, 6 mg, Nightly PRN

## 2021-07-25 NOTE — PROGRESS NOTES
was just at bedside to remove chest tube. Patient tolerated removal without issues.  placed vaseline gauze , betadine soaked gauze and ABD pad. Patient is now resting in bed with respirations unlabored.

## 2021-07-25 NOTE — PLAN OF CARE
Problem: Falls - Risk of:  Goal: Will remain free from falls  Description: Will remain free from falls  Outcome: Ongoing  Goal: Absence of physical injury  Description: Absence of physical injury  Outcome: Ongoing     Problem: Skin Integrity:  Goal: Will show no infection signs and symptoms  Description: Will show no infection signs and symptoms  Outcome: Ongoing  Goal: Absence of new skin breakdown  Description: Absence of new skin breakdown  Outcome: Ongoing     Problem: Pain:  Description: Pain management should include both nonpharmacologic and pharmacologic interventions.   Goal: Pain level will decrease  Description: Pain level will decrease  Outcome: Ongoing  Goal: Control of acute pain  Description: Control of acute pain  Outcome: Ongoing  Goal: Control of chronic pain  Description: Control of chronic pain  Outcome: Ongoing     Problem: Gas Exchange - Impaired:  Goal: Levels of oxygenation will improve  Description: Levels of oxygenation will improve  Outcome: Ongoing     Problem: Discharge Planning:  Goal: Discharged to appropriate level of care  Description: Discharged to appropriate level of care  Outcome: Ongoing     Problem: Nutrition  Goal: Optimal nutrition therapy  Outcome: Ongoing

## 2021-07-26 ENCOUNTER — TELEPHONE (OUTPATIENT)
Dept: INTERNAL MEDICINE CLINIC | Age: 76
End: 2021-07-26

## 2021-07-26 ENCOUNTER — APPOINTMENT (OUTPATIENT)
Dept: GENERAL RADIOLOGY | Age: 76
DRG: 199 | End: 2021-07-26
Payer: MEDICARE

## 2021-07-26 VITALS
TEMPERATURE: 97.9 F | DIASTOLIC BLOOD PRESSURE: 62 MMHG | SYSTOLIC BLOOD PRESSURE: 115 MMHG | OXYGEN SATURATION: 92 % | WEIGHT: 130.8 LBS | HEIGHT: 67 IN | RESPIRATION RATE: 17 BRPM | BODY MASS INDEX: 20.53 KG/M2 | HEART RATE: 82 BPM

## 2021-07-26 DIAGNOSIS — J42 CHRONIC BRONCHITIS, UNSPECIFIED CHRONIC BRONCHITIS TYPE (HCC): ICD-10-CM

## 2021-07-26 PROCEDURE — 6370000000 HC RX 637 (ALT 250 FOR IP): Performed by: STUDENT IN AN ORGANIZED HEALTH CARE EDUCATION/TRAINING PROGRAM

## 2021-07-26 PROCEDURE — 94761 N-INVAS EAR/PLS OXIMETRY MLT: CPT

## 2021-07-26 PROCEDURE — 71045 X-RAY EXAM CHEST 1 VIEW: CPT

## 2021-07-26 PROCEDURE — 2700000000 HC OXYGEN THERAPY PER DAY

## 2021-07-26 PROCEDURE — 2580000003 HC RX 258: Performed by: INTERNAL MEDICINE

## 2021-07-26 PROCEDURE — 6370000000 HC RX 637 (ALT 250 FOR IP): Performed by: INTERNAL MEDICINE

## 2021-07-26 PROCEDURE — 6370000000 HC RX 637 (ALT 250 FOR IP): Performed by: FAMILY MEDICINE

## 2021-07-26 PROCEDURE — 99231 SBSQ HOSP IP/OBS SF/LOW 25: CPT | Performed by: INTERNAL MEDICINE

## 2021-07-26 PROCEDURE — 94150 VITAL CAPACITY TEST: CPT

## 2021-07-26 PROCEDURE — 94640 AIRWAY INHALATION TREATMENT: CPT

## 2021-07-26 PROCEDURE — 6360000002 HC RX W HCPCS: Performed by: INTERNAL MEDICINE

## 2021-07-26 RX ORDER — OXYCODONE HYDROCHLORIDE AND ACETAMINOPHEN 5; 325 MG/1; MG/1
1 TABLET ORAL EVERY 4 HOURS PRN
Qty: 10 TABLET | Refills: 0 | Status: SHIPPED | OUTPATIENT
Start: 2021-07-26 | End: 2021-07-29

## 2021-07-26 RX ORDER — NICOTINE 21 MG/24HR
1 PATCH, TRANSDERMAL 24 HOURS TRANSDERMAL DAILY
Qty: 30 PATCH | Refills: 3 | Status: SHIPPED | OUTPATIENT
Start: 2021-07-27 | End: 2022-05-10 | Stop reason: SDUPTHER

## 2021-07-26 RX ORDER — ALBUTEROL SULFATE 90 UG/1
2 AEROSOL, METERED RESPIRATORY (INHALATION) EVERY 6 HOURS PRN
Qty: 6.7 INHALER | Refills: 2 | Status: SHIPPED | OUTPATIENT
Start: 2021-07-26 | End: 2021-12-16 | Stop reason: SDUPTHER

## 2021-07-26 RX ADMIN — METHYLPREDNISOLONE SODIUM SUCCINATE 40 MG: 125 INJECTION, POWDER, LYOPHILIZED, FOR SOLUTION INTRAMUSCULAR; INTRAVENOUS at 08:31

## 2021-07-26 RX ADMIN — OXYCODONE HYDROCHLORIDE AND ACETAMINOPHEN 1 TABLET: 5; 325 TABLET ORAL at 13:57

## 2021-07-26 RX ADMIN — Medication 2 PUFF: at 11:38

## 2021-07-26 RX ADMIN — SODIUM CHLORIDE, PRESERVATIVE FREE 10 ML: 5 INJECTION INTRAVENOUS at 08:32

## 2021-07-26 RX ADMIN — ENOXAPARIN SODIUM 30 MG: 30 INJECTION SUBCUTANEOUS at 08:31

## 2021-07-26 RX ADMIN — Medication 2 PUFF: at 08:11

## 2021-07-26 RX ADMIN — OXYCODONE HYDROCHLORIDE AND ACETAMINOPHEN 1 TABLET: 5; 325 TABLET ORAL at 06:37

## 2021-07-26 RX ADMIN — LEVOTHYROXINE SODIUM 25 MCG: 25 TABLET ORAL at 06:38

## 2021-07-26 RX ADMIN — METOPROLOL TARTRATE 12.5 MG: 25 TABLET, FILM COATED ORAL at 08:31

## 2021-07-26 RX ADMIN — ALBUTEROL SULFATE 2 PUFF: 90 AEROSOL, METERED RESPIRATORY (INHALATION) at 08:10

## 2021-07-26 RX ADMIN — BUDESONIDE AND FORMOTEROL FUMARATE DIHYDRATE 2 PUFF: 80; 4.5 AEROSOL RESPIRATORY (INHALATION) at 08:11

## 2021-07-26 RX ADMIN — ALBUTEROL SULFATE 2 PUFF: 90 AEROSOL, METERED RESPIRATORY (INHALATION) at 11:38

## 2021-07-26 ASSESSMENT — PAIN SCALES - GENERAL
PAINLEVEL_OUTOF10: 0
PAINLEVEL_OUTOF10: 3
PAINLEVEL_OUTOF10: 6
PAINLEVEL_OUTOF10: 7

## 2021-07-26 ASSESSMENT — PAIN DESCRIPTION - ORIENTATION
ORIENTATION: RIGHT
ORIENTATION: RIGHT

## 2021-07-26 ASSESSMENT — PAIN DESCRIPTION - DESCRIPTORS
DESCRIPTORS: SORE
DESCRIPTORS: ACHING

## 2021-07-26 ASSESSMENT — PAIN DESCRIPTION - LOCATION
LOCATION: RIB CAGE
LOCATION: RIB CAGE

## 2021-07-26 ASSESSMENT — PAIN DESCRIPTION - PAIN TYPE
TYPE: SURGICAL PAIN
TYPE: SURGICAL PAIN

## 2021-07-26 ASSESSMENT — PAIN DESCRIPTION - FREQUENCY
FREQUENCY: INTERMITTENT
FREQUENCY: INTERMITTENT

## 2021-07-26 NOTE — PROGRESS NOTES
Pulmonary and Critical Care  Progress Note      VITALS:  /62   Pulse 82   Temp 97.9 °F (36.6 °C) (Oral)   Resp 17   Ht 5' 7\" (1.702 m)   Wt 130 lb 12.8 oz (59.3 kg)   SpO2 92%   BMI 20.49 kg/m²     Subjective:   CHIEF COMPLAINT :SOB     HPI:                The patient is lying in the bed. He is in mild resp distress. His right chest tube has been removed. He has no pneumothorax on the CXR. Objective:   PHYSICAL EXAM:    LUNGS:Occasional basal crackles  Abd-soft, BS+,NT  Ext- no pedal edema  CVS-s1s2,no murmurs      DATA:    CBC:  No results for input(s): WBC, RBC, HGB, HCT, PLT, MCV, MCH, MCHC, RDW, NRBC, SEGSPCT, BANDSPCT in the last 72 hours. BMP:  No results for input(s): NA, K, CL, CO2, BUN, CREATININE, CALCIUM, GLUCOSE in the last 72 hours. ABG:  No results for input(s): PH, PO2ART, RCC5WMY, HCO3, BEART, O2SAT in the last 72 hours. BNP  No results found for: BNP   D-Dimer:  No results found for: DDIMER   1. Radiology: Interval removal of right chest tube.  No pneumothorax      Assessment/Plan     Patient Active Problem List    Diagnosis Date Noted    Respiratory distress     Acute respiratory failure with hypoxia ([facility] 07/22/2021    Hypothyroidism due to acquired atrophy of thyroid 05/11/2021    SCC (squamous cell carcinoma of lung), right [facility])      Overview Note:     Dr Amrit Brady, Dr Brian Shahid, Dr Cherene Kehr and Dr Libertad Lewis--- NOT candidate for lobectomy.       Malignant neoplasm of lower lobe of right lung ([address] 01/08/2019    Squamous cell lung cancer, right ([address] 12/17/2018    SOB (shortness of breath) on exertion 12/17/2018    Primary lung squamous cell carcinoma, right ([address] 12/01/2018     Overview Note:     Dr Cherene Kehr, Dr Akash Upton Pulmonary nodule, right 08/17/2018    Carotid stenosis, bilateral 08/08/2018    Pulmonary nodule, left 07/30/2018    Subclavian artery stenosis, right ([facility] 07/25/2018    Essential hypertension 01/29/2018    PVD (peripheral vascular disease) with claudication ([facility] 07/28/2017    S/P colonoscopy 02/01/2017     Overview Note:     Dr Jillian Alas at [facility]-  diverticulosis and angioectasis, being referred to surgeon      Hyperlipidemia, mixed     Atherosclerosis of aortic arch ([facility]      Overview Note:     NOTED ON CXR 1/2017      COPD (chronic obstructive pulmonary disease) ([facility]     Rectal cancer ([facility] 01/01/2009     Overview Note:     Dr King Mohr oncology, seen by GI at [facility]- Dr Jillian Alas     COPD  Hypoxia  Grade I diastolic dysfunction  Right spontaneous Pneumothorax s/p chest tube with resolution and removal  Squamous cell lung ca  Cachexia  Debility       1. Inhalers  2. Keep sats > 92%  3. ICS  4. OOB  5. PT/OT  6. Await placement  7. C.w present management  No follow-ups on file.     Electronically signed by Griselda Barber, MD on 7/26/2021 at 12:47 PM

## 2021-07-26 NOTE — DISCHARGE INSTR - DIET
are in safflower, sunflower, and corn oils. They are also the main fat in seafood. Omega-3 fatty acids are types of polyunsaturated fat. Eating fish may lower your chances of getting coronary artery disease. Fatty fish such as salmon and mackerel contain these healthy fatty acids. So do ground flaxseeds and flaxseed oil, soybeans, walnuts, and seeds. Why cut down on unhealthy fats? Eating foods that contain saturated fats can raise the LDL (\"bad\") cholesterol in your blood. Having a high level of LDL cholesterol increases your chance of hardening of the arteries (atherosclerosis), which can lead to heart disease, heart attack, and stroke. In general:  · No more than 10% of your daily calories should come from saturated fat. This is about 20 grams in a 2,000-calorie diet. · No more than 10% of your daily calories should come from polyunsaturated fat. This is about 20 grams in a 2,000-calorie diet. · Monounsaturated fats can be up to 15% of your daily calories. This is about 25 to 30 grams in a 2,000-calorie diet. If you're not sure how much fat you should be eating or how many calories you need each day to stay at a healthy weight, talk to a registered dietitian. A dietitian can help you create a plan that's right for you. What can you do to cut down on fat? Foods like cheese, butter, sausage, and desserts can have a lot of unhealthy fats. Try these tips for healthier meals at home and when you eat out. At home  · Fill up on fruits, vegetables, and whole grains. · Think of meat as a side dish instead of as the main part of your meal.  · When you do eat meat, make it extra-lean ground beef (97% lean), ground turkey breast (without skin added), meats with fat trimmed off before cooking, or skinless chicken. · Try main dishes that use whole wheat pasta, brown rice, dried beans, or vegetables. · Use cooking methods that use little or no fat, such as broiling, steaming, or grilling.  Use cooking spray instead of oil. If you use oil, use a monounsaturated oil, such as canola or olive oil. · Read food labels on canned, bottled, or packaged foods. Choose those with little saturated fat. When eating out at a restaurant  · Order foods that are broiled or poached instead of fried or breaded. · Cut back on the amount of butter or margarine that you use on bread. Use small amounts of olive oil instead. · Order sauces, gravies, and salad dressings on the side, and use only a little. · When you order pasta, choose tomato sauce instead of cream sauce. · Ask for salsa with your baked potato instead of sour cream, butter, cheese, or lewis. Where can you learn more? Go to https://VetCentric.Scribble Press. org and sign in to your [employer] Dot World account. Enter E103 in the [employer] box to learn more about \"Learning About Low-Fat Eating. \"     If you do not have an account, please click on the \"Sign Up Now\" link. Current as of: December 17, 2020               Content Version: 12.9  © 0251-1936 [email or web address]com. Care instructions adapted under license by [employer] If you have questions about a medical condition or this instruction, always ask your healthcare professional. [address] any warranty or liability for your use of this information.

## 2021-07-26 NOTE — DISCHARGE INSTR - OTHER ORDERS
Learning About a Chest Tube  What is a chest tube? A chest tube is a hollow plastic tube. Your doctor puts the tube into the space around your lungs to drain away fluid, blood, or air. An injury to the chest or another condition may cause fluid, blood, or air to build up around your lungs. This buildup can also happen after some surgeries. Having fluid, blood, or air in the space around your lungs can cause your lung to collapse. It can also cause infections or breathing problems. How is the chest tube inserted? Putting in a chest tube is considered minor surgery, and it may be done while you're awake. You may be in the hospital or an outpatient clinic when the tube is inserted. You will be lying on your back or sitting up when it is done. Your doctor will inject medicine to numb the area of your chest where the tube will be put in. After your skin is numb, the doctor will make a small cut, called an incision, between two of your ribs. The doctor will put the tube through the incision and into the space around your lungs. The tube will stay in your chest until all or most of the fluid, blood, or air drains out. This usually takes a few days. Your doctor may attach the tube to a machine that can help the space around your lungs drain better. While the tube is in your chest, you won't be able to be very active. Your doctor may want you to stay in the hospital to get help with your chest tube, or you may be able to care for it at home. What can you expect after the tube has been removed? When the tube is removed, the doctor or nurse will tape a bandage over the incision. It will take about 3 to 4 weeks for the cut to heal completely. It may leave a small scar that will fade with time. You may have some pain in your chest from where the tube was. For most people, the pain goes away after about 2 weeks. If your doctor prescribed pain medicine, take it as prescribed.  If you aren't taking a prescription pain medicine, ask your doctor if you can take an over-the counter medicine, as needed. Your doctor may want you to have a follow-up X-ray to make sure that fluid, blood, or air hasn't built up again in the space around your lungs. Follow-up care is a key part of your treatment and safety. Be sure to make and go to all appointments, and call your doctor if you are having problems. It's also a good idea to know your test results and keep a list of the medicines you take. Where can you learn more? Go to https://Activity RocketpeSOV Therapeutics.v2 Ratings. org and sign in to your Neolinear account. Enter X401 in the SceneDoc box to learn more about \"Learning About a Chest Tube. \"     If you do not have an account, please click on the \"Sign Up Now\" link. Current as of: October 26, 2020               Content Version: 12.9  © 9597-4880 Healthwise, Incorporated. Care instructions adapted under license by [facility]). If you have questions about a medical condition or this instruction, always ask your healthcare professional. [place] [address] any warranty or liability for your use of this information.

## 2021-07-26 NOTE — CARE COORDINATION
Cm met with pt to discuss discharge planning. Pt lives alone in a 1 story home. Pt's wife  in 2021. Pt has supportive children. Pt has home 02, Nebulizer, Air Conditioning and walker. Pt has PCP and insurance to assist with cost of Rxs. CM asked pt about possible HC at discharge and he does not feel if will be needed at this time but is aware that he can contact his PCP if he changes his mind and wants a nurse to check on him. Pt anticipates discharge to home today with no needs.

## 2021-07-26 NOTE — TELEPHONE ENCOUNTER
[address] Transitions Initial Follow Up Call    Outreach made within 2 business days of discharge: yes    Patient: Lance Caban Patient : 1945   MRN: [id number]  Reason for Admission: There are no discharge diagnoses documented for the most recent discharge. Discharge Date: 21       Spoke with: Patient    Discharge department/facility: [facility]    TCM Interactive Patient Contact:  Was patient able to fill all prescriptions:yes  Was patient instructed to bring all medications to the follow-up visit: yes  Is patient taking all medications as directed in the discharge summary?  yes  Does patient understand their discharge instructions: yes  Does patient have questions or concerns that need addressed prior to 7-14 day follow up office visit: no    Scheduled appointment with PCP within 7-14 days    Follow Up  Future Appointments   Date Time Provider Andre Oakes   2021 12:05 AM Farooq Gaona [address] 1 [facility]   8/3/2021  9:15 AM Hawa Sommers MD [address] [facility]   2021  9:30 AM SHERIN, MED ONC [facility]URSE [facility]   2021  9:45 AM MD Sam Alberts6 Rojelio Montero [facility]   11/10/2021  8:15 AM MD Sam Kwon[address] [facility]       Jh Foote MA

## 2021-07-27 ENCOUNTER — CARE COORDINATION (OUTPATIENT)
Dept: CASE MANAGEMENT | Age: 76
End: 2021-07-27

## 2021-08-03 ENCOUNTER — OFFICE VISIT (OUTPATIENT)
Dept: INTERNAL MEDICINE CLINIC | Age: 76
End: 2021-08-03
Payer: MEDICARE

## 2021-08-03 VITALS
HEART RATE: 61 BPM | DIASTOLIC BLOOD PRESSURE: 84 MMHG | RESPIRATION RATE: 20 BRPM | BODY MASS INDEX: 17.85 KG/M2 | SYSTOLIC BLOOD PRESSURE: 120 MMHG | WEIGHT: 114 LBS | OXYGEN SATURATION: 90 %

## 2021-08-03 DIAGNOSIS — F51.01 PRIMARY INSOMNIA: ICD-10-CM

## 2021-08-03 DIAGNOSIS — J44.1 COPD EXACERBATION (HCC): ICD-10-CM

## 2021-08-03 DIAGNOSIS — J96.01 ACUTE RESPIRATORY FAILURE WITH HYPOXIA (HCC): ICD-10-CM

## 2021-08-03 DIAGNOSIS — E03.4 HYPOTHYROIDISM DUE TO ACQUIRED ATROPHY OF THYROID: ICD-10-CM

## 2021-08-03 DIAGNOSIS — J44.1 COPD EXACERBATION (HCC): Primary | ICD-10-CM

## 2021-08-03 DIAGNOSIS — J93.9 PNEUMOTHORAX ON RIGHT: Primary | ICD-10-CM

## 2021-08-03 PROCEDURE — 1111F DSCHRG MED/CURRENT MED MERGE: CPT | Performed by: INTERNAL MEDICINE

## 2021-08-03 PROCEDURE — 99496 TRANSJ CARE MGMT HIGH F2F 7D: CPT | Performed by: INTERNAL MEDICINE

## 2021-08-03 RX ORDER — ALPRAZOLAM 0.25 MG/1
0.25 TABLET ORAL NIGHTLY PRN
Qty: 30 TABLET | Refills: 2 | Status: SHIPPED | OUTPATIENT
Start: 2021-08-03 | End: 2021-09-02

## 2021-08-03 RX ORDER — ALPRAZOLAM 0.25 MG/1
0.25 TABLET ORAL NIGHTLY PRN
Qty: 30 TABLET | Refills: 2 | Status: SHIPPED | OUTPATIENT
Start: 2021-08-03 | End: 2021-08-03 | Stop reason: SDUPTHER

## 2021-08-03 NOTE — PROGRESS NOTES
Post-Discharge Transitional Care Management Services or Hospital Follow Up      Fanta Aguilar   YOB: 1945    Date of Office Visit:  8/3/2021  Date of Hospital Admission: 7/22/21  Date of Hospital Discharge: 7/26/21  Readmission Risk Score(high >=14%.  Medium >=10%):Readmission Risk Score: 15      Care management risk score Rising risk (score 2-5) and Complex Care (Scores >=6): 2     Non face to face  following discharge, date last encounter closed (first attempt may have been earlier): 7/26/21  Call initiated 2 business days of discharge:yes    Patient Active Problem List   Diagnosis    COPD (chronic obstructive pulmonary disease) ([address]    Rectal cancer ([address]    Atherosclerosis of aortic arch ([address]    Hyperlipidemia, mixed    S/P colonoscopy    PVD (peripheral vascular disease) with claudication ([address]    Essential hypertension    Subclavian artery stenosis, right ([address]    Pulmonary nodule, left    Carotid stenosis, bilateral    Pulmonary nodule, right    Squamous cell lung cancer, right ([address]    SOB (shortness of breath) on exertion    Primary lung squamous cell carcinoma, right ([address]    Malignant neoplasm of lower lobe of right lung ([address]    SCC (squamous cell carcinoma of lung), right ([address]    Hypothyroidism due to acquired atrophy of thyroid    Acute respiratory failure with hypoxia ([address]    Respiratory distress       No Known Allergies    Medications listed as ordered at the time of discharge from [facility] Medication Instructions YOHANNES:    Printed on:08/03/21 7804   Medication Information                      albuterol sulfate  (90 Base) MCG/ACT inhaler  Inhale 2 puffs into the lungs every 6 hours as needed for Wheezing or Shortness of Breath             atorvastatin (LIPITOR) 10 MG tablet  Take 1 tablet by mouth daily             Ipratropium-Albuterol (ALBUTEROL-IPRATROPIUM IN)  Inhale 2 puffs into the lungs 4 times daily as needed levothyroxine (SYNTHROID) 25 MCG tablet  TAKE 1 TABLET BY MOUTH EVERY DAY             metoprolol tartrate (LOPRESSOR) 25 MG tablet  Take 0.5 tablets by mouth 2 times daily             Multiple Vitamins-Minerals (PRESERVISION AREDS PO)  Take by mouth daily             nicotine (NICODERM CQ) 21 MG/24HR  Place 1 patch onto the skin daily             OXYGEN  Inhale into the lungs nightly             SYMBICORT 80-4.5 MCG/ACT AERO  Inhale 2 puffs into the lungs 2 times daily                   Medications marked \"taking\" at this time  Outpatient Medications Marked as Taking for the 8/3/21 encounter (Office Visit) with Mirtha Hall MD   Medication Sig Dispense Refill    albuterol sulfate  (90 Base) MCG/ACT inhaler Inhale 2 puffs into the lungs every 6 hours as needed for Wheezing or Shortness of Breath 6.7 Inhaler 2    nicotine (NICODERM CQ) 21 MG/24HR Place 1 patch onto the skin daily 30 patch 3    SYMBICORT 80-4.5 MCG/ACT AERO Inhale 2 puffs into the lungs 2 times daily 1 Inhaler 3    levothyroxine (SYNTHROID) 25 MCG tablet TAKE 1 TABLET BY MOUTH EVERY DAY 30 tablet 5    metoprolol tartrate (LOPRESSOR) 25 MG tablet Take 0.5 tablets by mouth 2 times daily 90 tablet 1    atorvastatin (LIPITOR) 10 MG tablet Take 1 tablet by mouth daily 90 tablet 1    Ipratropium-Albuterol (ALBUTEROL-IPRATROPIUM IN) Inhale 2 puffs into the lungs 4 times daily as needed      OXYGEN Inhale into the lungs nightly      Multiple Vitamins-Minerals (PRESERVISION AREDS PO) Take by mouth daily          Medications patient taking as of now reconciled against medications ordered at time of hospital discharge: Yes    Chief Complaint   Patient presents with    Follow-Up from [address] [place] [address]    Hypothyroidism     newly diagnosed       HPI    Inpatient course: Discharge summary reviewed- see chart.   Satnam Mehta is a 76 y.o.  male  who presents with <principal problem not specified> Progressive shortness of breath. He was found to have a right pneumothorax     Chest tube was placed and patient was admitted. He was monitored closely, given pain medication and pulmonology was consulted. Serial chest XRays were performed, noted stability of lungs. Yesterday the tube was [facility] and he was monitored. Today repeat CXR showed no evidence of pneumothorax. The remainder of his stay was uneventful.     I reviewed PDMP and there were no concerning findings           The patient expressed appropriate understanding of and agreement with the discharge recommendations, medications, and plan.      Consults this admission:  IP CONSULT TO HOSPITALIST  IP CONSULT TO PULMONOLOGY    --- ADMIT H&P-  76 y.o. male history of COPD, lung cancer, anal cancer who presented with worsening shortness of breath. The patient states for the last 3 days he has had progressive worsening dyspnea. He uses 2 L at home. He states he had hot and cold spells today. He denies any chest pain. He decided to come to the ER for further evaluation. The patient has history of squamous cell lung cancer. He is not candidate for surgery. He underwent radiation therapy. He has been under surveillance with oncology with serial imaging. The patient has known radiation fibrosis of right lung.     In ER, presenting BP was 230/115 and he was tachycardic 108 bpm.  He was hypoxic requiring 6 L. He was then transition to the BiPAP due to tachypnea. Patient is placed on nitroglycerin drip for hypertension. Lab work is pertinent for BNP 1237, Trop 0.023, WBC 11. 9.UA pertinent for proteinuria. Chest x-ray showed moderate right pneumothorax. ER physician emergently placed right lung chest tube. Repeat chest x-ray shows improvement of pneumothorax. Patient remains on BiPAP but work of breathing is improved. Acute on chronic hypoxic respiratory failure-due to pneumothorax  -Continue BiPAP. Wean as tolerated.   -Consult pulmonology  -Check echocardiogram     Right chest pneumothorax-possibly due to emphysema  -Right lung chest tube in place  -Follow-up chest x-ray in a.m.  -Management per pulmonology.     COPD exacerbation  -Start IV steroids  -Duo nebs every every 4 hours     Hypertensive urgency-due to pneumothorax  -Weaned off nitroglycerin drip, wean as tolerated. BP improved. -Patient has proteinuria on UA. Check prot/cr ratio.     Squamous Cell Carcinoma of lung  -Not candidate for surgery per CT surgery. s/p radiation treatment. He is under surveillance with Oncology.     Hx of anal canal squamous cell carcinoma  -status post concurrent chemoradiation therapy     Hx PVD s/p angioplasty  -Continue statin      Hx hypothyroidism  -Continue synthroid     Daughter at bedside. Questions and concerns were addressed.     DVT Prophylaxis: Lovenox  Diet: No diet orders on file  Code Status: No Order     Interval history/Current status:   CHEST TUBE REMOVED, HIS R SIDED CHEST PAIN IS BETTER. HAS DYSPNEA AND IS ON CONTINUOUS O2,   WHEEZING IS SPORADIC, NO FEVER OR CHILLS. NOT HAD F/U [address]. NEEDS O2 PORTABLE  HTN- BP HIGH TODAY BUT WAS STABLE IN ED.    STARTED SYNTHROID FOR NEW ONSET HYPOTHYROID IN MAY AND DUE FOR RECHECK    Patient was evaluated today for the diagnosis of COPD. I entered a DME order for home oxygen because the diagnosis and testing requires the patient to have supplemental oxygen. Condition will improve or be benefited by oxygen use. The patient is  able to perform good mobility in a home setting and therefore does require the use of a portable oxygen system. The need for this equipment was discussed with the patient and he understands and is in agreement. HAS HAD SOME ANXIETY AND INSOMNIA NEEDED MEDS FOR SLEEP IN HOSPITAL. Review of Systems    Vitals:    08/03/21 0907 08/03/21 0936   BP: (!) 170/88 120/84   Pulse: 61    Resp: 20    SpO2: 90%    Weight: 114 lb (51.7 kg)      Body mass index is 17.85 kg/m².    Wt Readings from Last 3 Encounters:   08/03/21 114 lb (51.7 kg)   07/26/21 130 lb 12.8 oz (59.3 kg)   06/08/21 117 lb 12.8 oz (53.4 kg)     BP Readings from Last 3 Encounters:   08/03/21 (!) 170/88   07/26/21 115/62   06/08/21 (!) 166/72       Physical Exam        Assessment/Plan:  1. Pneumothorax on right  Resolved after chest tube, also arrange for f/u Pulmonary.  - Sunni Hunt MD, Pulmonology, [place] [address] MED LIST    2. Acute respiratory failure with hypoxia ([facility]  Still w some SOB, set up for home O2 eval and also portable O2.    - Sunni Hunt MD, Pulmonology, [place] [address] MED LIST    3. COPD exacerbation ([address]  Better after treatment in hospital and no wheezing on exam today  - Sunni Hunt MD, Pulmonology, [address] [place] [address] CURRENT OUTPATIENT MED LIST    4. Hypothyroidism due to acquired atrophy of thyroid  F/u tfts  - TSH without Reflex; Future  - T4, Free; Future  - VT DISCHARGE MEDS RECONCILED W/ CURRENT OUTPATIENT MED LIST    5. Primary insomnia  Trial xanax also prn qhs for his chr insomnia  - ALPRAZolam (XANAX) 0.25 MG tablet; Take 1 tablet by mouth nightly as needed for Anxiety for up to 30 days. Dispense: 30 tablet;  Refill: 2  - VT DISCHARGE MEDS RECONCILED W/ CURRENT OUTPATIENT MED LIST        Medical Decision Making: high complexity

## 2021-08-10 ENCOUNTER — CARE COORDINATION (OUTPATIENT)
Dept: CARE COORDINATION | Age: 76
End: 2021-08-10

## 2021-08-10 NOTE — CARE COORDINATION
Yrn 45 Transitions Follow Up Call    8/10/2021    Patient: Zenobia Ledesma  Patient : 1945   MRN: <[id number]>  Reason for Admission: Progressive shortness of breath. He was found to have a right pneumothorax  Discharge Date: 21 RARS: Readmission Risk Score: 15       Spoke with: 66 Karin Fang Transitions Follow Up Call    Pt doing well, has family assisting where and when needed. Pt states decrease smoking and not around O2. Denies needs at this time, SOB better. Aggreable to f/u calls. Needs to be reviewed by the provider   Additional needs identified to be addressed with provider: No  none             Method of communication with provider : none      Care Transition Nurse (CTN) contacted the patient by telephone to follow up after admission on -. Verified name and  with patient as identifiers. Addressed changes since last contact: none  Discussed follow-up appointments. If no appointment was previously scheduled, appointment scheduling offered: No and pt completed. Is follow up appointment scheduled within 7 days of discharge? Yes. Advance Care Planning:   Does patient have an Advance Directive: patient declined education. CTN reviewed discharge instructions, medical action plan and red flags with patient and discussed any barriers to care and/or understanding of plan of care after discharge. Discussed appropriate site of care based on symptoms and resources available to patient including: PCP, Specialist and When to call 911. The patient agrees to contact the PCP office for questions related to their healthcare. Patients top risk factors for readmission: smoker, lung CA  Interventions to address risk factors: Obtained and reviewed discharge summary and/or continuity of care documents      Non-[facility] follow up appointment(s): na    CTN provided contact information for future needs.  Plan for follow-up call in 5-7 days based on severity of symptoms and risk factors. Plan for next call: referral to ambulatory care manager-refer to leads, pt could use education on chronic illness and smoking cessation if agreeable and if O2 needs have been addressed, pt looking into portable O2        Care Transitions Subsequent and Final Call    Schedule Follow Up Appointment with PCP: Completed  Subsequent and Final Calls  Do you have any ongoing symptoms?: Yes  Onset of Patient-reported symptoms: Other  Patient-reported symptoms: Shortness of Breath  Have your medications changed?: No  Do you have any questions related to your medications?: No  Do you currently have any active services?: No  Do you have any needs or concerns that I can assist you with?: No  Identified Barriers: None  Care Transitions Interventions     Other Therapy Services: Declined     Senior Services: Declined      Smoking Cessation: Declined    Disease Specific Clinic: [facility] Work: Declined    Other Interventions:            Follow Up  Future Appointments   Date Time Provider Andre Oakes   8/25/2021  9:30 AM POPEYE Singer - CNP [facility] PULM [place]   9/7/2021  9:30 AM [facility] CT ROOM 2 [facility] Imaging   9/8/2021  9:30 AM SHERIN, MED ONC NURSE SHERIN MED ONC Zara Roxo   9/8/2021  9:45 AM Lawanda Ratliff MD [facility] CC MMA   11/10/2021  8:15 AM Tad Silva MD [facility] E S IM LORETTA Andrews RN

## 2021-08-17 LAB
T4 FREE: 1.08 NG/DL (ref 0.8–1.8)
TSH SERPL DL<=0.05 MIU/L-ACNC: 6.89 MCIU/ML (ref 0.4–4.5)

## 2021-08-18 DIAGNOSIS — E03.4 HYPOTHYROIDISM DUE TO ACQUIRED ATROPHY OF THYROID: Primary | ICD-10-CM

## 2021-08-18 RX ORDER — LEVOTHYROXINE SODIUM 0.07 MG/1
75 TABLET ORAL DAILY
Qty: 90 TABLET | Refills: 1
Start: 2021-08-18 | End: 2021-08-19 | Stop reason: SDUPTHER

## 2021-08-18 NOTE — RESULT ENCOUNTER NOTE
Call pt, THYROID FXN IS STILL LOW. IF IS ON SYNTHROID 25MCG DAILY, REC TO INCR TO 75MCG DAILY PLS. WE'LL REORDER FOR F/U THYROID TESTS AT HIS APPT NEXT WEEK.  THX.     Place med changes/corrections on EPIC medlist please

## 2021-08-19 RX ORDER — LEVOTHYROXINE SODIUM 0.07 MG/1
75 TABLET ORAL DAILY
Qty: 90 TABLET | Refills: 1 | Status: SHIPPED | OUTPATIENT
Start: 2021-08-19 | End: 2022-02-09

## 2021-08-25 ENCOUNTER — INITIAL CONSULT (OUTPATIENT)
Dept: PULMONOLOGY | Age: 76
End: 2021-08-25
Payer: MEDICARE

## 2021-08-25 ENCOUNTER — TELEPHONE (OUTPATIENT)
Dept: ONCOLOGY | Age: 76
End: 2021-08-25

## 2021-08-25 VITALS
SYSTOLIC BLOOD PRESSURE: 142 MMHG | BODY MASS INDEX: 18.11 KG/M2 | DIASTOLIC BLOOD PRESSURE: 62 MMHG | HEART RATE: 71 BPM | HEIGHT: 67 IN | OXYGEN SATURATION: 97 % | WEIGHT: 115.4 LBS

## 2021-08-25 DIAGNOSIS — C34.91 SCC (SQUAMOUS CELL CARCINOMA OF LUNG), RIGHT (HCC): ICD-10-CM

## 2021-08-25 DIAGNOSIS — Z01.818 PREOP TESTING: ICD-10-CM

## 2021-08-25 DIAGNOSIS — J44.9 CHRONIC OBSTRUCTIVE PULMONARY DISEASE, UNSPECIFIED COPD TYPE (HCC): Primary | ICD-10-CM

## 2021-08-25 DIAGNOSIS — Z00.00 HEALTHCARE MAINTENANCE: ICD-10-CM

## 2021-08-25 DIAGNOSIS — J96.11 CHRONIC RESPIRATORY FAILURE WITH HYPOXIA (HCC): ICD-10-CM

## 2021-08-25 DIAGNOSIS — R06.02 SOB (SHORTNESS OF BREATH) ON EXERTION: ICD-10-CM

## 2021-08-25 PROCEDURE — 3023F SPIROM DOC REV: CPT | Performed by: NURSE PRACTITIONER

## 2021-08-25 PROCEDURE — G8926 SPIRO NO PERF OR DOC: HCPCS | Performed by: NURSE PRACTITIONER

## 2021-08-25 PROCEDURE — 99204 OFFICE O/P NEW MOD 45 MIN: CPT | Performed by: NURSE PRACTITIONER

## 2021-08-25 PROCEDURE — G8427 DOCREV CUR MEDS BY ELIG CLIN: HCPCS | Performed by: NURSE PRACTITIONER

## 2021-08-25 PROCEDURE — G8419 CALC BMI OUT NRM PARAM NOF/U: HCPCS | Performed by: NURSE PRACTITIONER

## 2021-08-25 ASSESSMENT — COPD QUESTIONNAIRES
QUESTION_9 IN PAST WEEK AVERAGE AMOUNT YOUR SOCIAL ACTIVITIES (SUCH AS TALKING, BEING WITH CHILDREN, VISITING FRIENDS/RELATIVES) WERE LIMITED DUE TO BREATHING PROBLEMS: 1
QUESTION_4 IN PAST WEEK HOW OFTEN DID YOU FEEL DEPRESSED (DOWN) BECAUSE OF YOUR BREATHING PROBLEMS: 0
QUESTION_3 IN PAST WEEK HOW OFTEN DID YOU FEEL CONCERNED ABOUT GETTING A COLD OR YOUR BREATHING GETTING WORSE: 2
QUESTION_8 IN PAST WEEK AVERAGE AMOUNT YOUR ABILITY TO DO MODERATE PHYSICAL ACTIVITIES (SUCH AS WALKING, HOUSEWORK, CARRYING THINGS) WERE LIMITED BECAUSE OF BREATHING PROBLEMS: 5
QUESTION_7 IN PAST WEEK AVERAGE AMOUNT YOUR ABILITY TO DO STRENUOUS PHYSICAL ACTIVITIES (SUCH AS CLIMBING STAIRS, HURRYING, PARTICIPATING IN SPORTS) WERE LIMITED BECAUSE OF BREATHING PROBLEMS: 2
QUESTION_10 IN PAST WEEK AVERAGE AMOUNT YOUR ABILITY TO DO SOCIAL ACTIVITIES (SUCH AS TALKING, BEING WITH CHILDREN, VISITING FRIENDS/RELATIVES) WERE LIMITED BECAUSE OF BREATHING PROBLEMS: 0
QUESTION_1 IN PAST WEEK HOW OFTEN DID YOU FEEL SHORT OF BREATH WHILE AT REST: 1
QUESTION_2 IN PAST WEEK HOW OFTEN DID YOU FEEL SHORT OF BREATH WHILE DOING PHYSICAL ACTIVITIES: 6
QUESTION_6 IN PAST WEEK HOW MUCH OF THE TIME DID YOU PRODUCT SPUTUM OR PHLEGM: 1
QUESTION_5 IN PAST WEEK HOW MUCH OF THE TIME DID YOU COUGH: 2

## 2021-08-25 NOTE — PROGRESS NOTES
Subjective:   CHIEF COMPLAINT / HPI:       Bautista Nunn is a 76 y.o. 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 Margo Zuluaga is a former patient of Dr. Esperanza Puri whom he last saw in the office on 12/17/2018. He did recently see in the hospital both Dr. Svitlana Tang and Dr. Esperanza Puri. Margo Zuluaga states he recently presented to the emergency room on 7/22/2021 for 3-day history of worsening shortness of breath, increased inhaler use, increased nonproductive cough. He states his shortness of breath was worsening with any type of activity and at rest.  EMS was called to his residence and upon arrival it was noted that his O2 sat was 70% on his home 2 L oxygen via nasal cannula. They increased his O2 to 6 L and noted his O2 sat to be in the low 90s. He did complain of chest tightness and transport to the hospital.  Upon presentation to the hospital he was placed on BiPAP which did significantly improve his oxygen but he continued to complain of chest tightness. Emergency room provider performed bedside ultrasound which she states demonstrated bilateral B-lines especially in the basilar lung fields concerning for flash pulmonary edema in the setting of high systolic blood pressures versus COPD exacerbation. Chest x-ray was obtained which demonstrated moderate right pneumothorax. Chest tube was placed repeat x-ray showed near complete resoultion of right pneumothorax. He remained on BiPAP and was admitted.   BiPAP was eventually weaned to nasal cannula, chest tube was removed on 7/25/2021, and he was discharged on 7/26/2021 after chest x-ray demonstrated no evidence of pneumothorax. He was discharged with continuation of his home O2 at 2 L/min. Mercedes Zhang states they are practicing social distancing, wearing a mask when out in public, washing hands frequently or using hand . Denies any fever, chills, malaise, change in sensation of taste or smell, headache or lightheadedness. Denies any known contacts with persons with respiratory infection, positive for coronavirus or under investigation for possible coronavirus exposure.     Influenza immunization: 9/17/2020  Pneumococcal immunization: PPV 23 on 7/28/2017, Prevnar 13 on 10/31/2016  COVID-19 immunization: 4/20/2021, 3/30/2021-Pfizer  Smoking history: Current smoker, 60+ pack year history, currently smoking approximately 10 cigarettes a day, attempting cessation   PCP: Hans Macias MD    Past Medical History:  Past Medical History:   Diagnosis Date    Atherosclerosis of aortic arch ([facility]     NOTED ON CXR 1/2017- CONSIDER CARDIO CONSULT    Atypical chest pain     COPD (chronic obstructive pulmonary disease) ([facility]     follow with Dr Mary Patricia- chantix ineffective    Essential hypertension 01/29/2018    Full dentures     History of external beam radiation therapy 2009/2019    pelvis/anus in 2009 and right lower lobe lung mass SBRT in 2019 per Dr. Anthony Moses at [address] [place] [address] Hyperlipidemia, mixed     Hypertension     Hypothyroidism due to acquired atrophy of thyroid 05/11/2021    ANTIPEROXIDASE ANTIBODY NEGATIVE    On home oxygen therapy     2l/nc at night and prn    PAD (peripheral artery disease) ([facility]     R leg on doppler 8/2017-- referring to Dr Regi Hendrickson Primary lung squamous cell carcinoma, right ([address] 12/2018    Dr González Glasgow, Dr Mary Patricia, Dr Anthony Moses- treated w RT,    Pulmonary nodule, left 07/2018    Rectal cancer [facility]) 2009    Dr Maxene Carrel oncology, seen by GI at [facility]- Dr Yoni Mcrae- tx with chemo and radiation     S/P colonoscopy 02/01/2017    Dr Drake Morales at [facility]-  diverticulosis and angioectasis, being referred to surgeon--CONSIDER RECHECK 5 YRS    SCC (squamous cell carcinoma of lung), right (HCC)     Dr Rosangela Umana, periodically Dr Farshad Foster.  had resection w Dr Lucy Kincaid Subclavian artery stenosis, right [facility])     Wears glasses        Current Medications:      Current Outpatient Medications:     levothyroxine (SYNTHROID) 75 MCG tablet, Take 1 tablet by mouth Daily, Disp: 90 tablet, Rfl: 1    albuterol sulfate  (90 Base) MCG/ACT inhaler, Inhale 2 puffs into the lungs every 6 hours as needed for Wheezing or Shortness of Breath, Disp: 6.7 Inhaler, Rfl: 2    nicotine (NICODERM CQ) 21 MG/24HR, Place 1 patch onto the skin daily, Disp: 30 patch, Rfl: 3    SYMBICORT 80-4.5 MCG/ACT AERO, Inhale 2 puffs into the lungs 2 times daily, Disp: 1 Inhaler, Rfl: 3    metoprolol tartrate (LOPRESSOR) 25 MG tablet, Take 0.5 tablets by mouth 2 times daily, Disp: 90 tablet, Rfl: 1    atorvastatin (LIPITOR) 10 MG tablet, Take 1 tablet by mouth daily, Disp: 90 tablet, Rfl: 1    OXYGEN, Inhale into the lungs nightly, Disp: , Rfl:     Multiple Vitamins-Minerals (PRESERVISION AREDS PO), Take by mouth daily, Disp: , Rfl:     Ipratropium-Albuterol (ALBUTEROL-IPRATROPIUM IN), Inhale 2 puffs into the lungs 4 times daily as needed , Disp: , Rfl:     No Known Allergies    Social History:    Social History     Socioeconomic History    Marital status:      Spouse name: None    Number of children: 10    Years of education: None    Highest education level: None   Occupational History    Occupation: retired   Tobacco Use    Smoking status: Current Every Day Smoker     Packs/day: 1.00     Years: 60.00     Pack years: 60.00     Types: Cigarettes    Smokeless tobacco: Never Used   Vaping Use    Vaping Use: Former   Substance and Sexual Activity    Alcohol use:  Yes     Alcohol/week: 20.0 standard drinks     Types: 20 Cans of beer per week     Comment: average\"3 beers per day\" per pt on 12/6/2018    Drug use: No    Sexual activity: None   Other Topics Concern    None   Social History Narrative    6 childrean total ; strong, supportive family. Family does grocery shopping, clean home and prepare meals. Social Determinants of Health     Financial Resource Strain:     Difficulty of Paying Living Expenses:    Food Insecurity:     Worried About Running Out of Food in the Last Year:     [address] in the Last Year:    Transportation Needs:     Lack of Transportation (Medical):  Lack of Transportation (Non-Medical):    Physical Activity:     Days of Exercise per Week:     Minutes of Exercise per Session:    Stress:     Feeling of Stress :    Social Connections:     Frequency of Communication with Friends and Family:     Frequency of Social Gatherings with Friends and Family:     Attends [employer] Services:     Active Member of Clubs or Organizations:     Attends Club or Organization Meetings:     Marital Status:    Intimate Partner Violence:     Fear of Current or Ex-Partner:     Emotionally Abused:     Physically Abused:     Sexually Abused:        Family History:    Family History   Problem Relation Age of Onset    Cirrhosis Mother    Wilhelmenia Laura Father     Cancer Father         brain cancer         REVIEW OF SYSTEMS:    CONSTITUTIONAL:  Negative for fevers, chills, diaphoresis, activity change, appetite change, night sweats, unexpected weight change.    HEENT:  Negative for hearing loss,  sinus pressure, nasal congestion, epistaxis and snoring  RESPIRATORY: Positive shortness of breath on exertion, positive cough with rare productivity, negative wheeze  CARDIOVASCULAR:  Negative for chest pain, palpitations, exertional chest pressure/discomfort, edema, syncope  GASTROINTESTINAL: Negative for nausea, vomiting, diarrhea, constipation, blood in stool and abdominal pain  GENITOURINARY:  Negative for frequency, dysuria and hematuria  HEMATOLOGIC/LYMPHATIC:  Negative for easy bruising, bleeding contact with persons who have not been immunized, social distancing when needing to be in public, handwashing practice, wiping items touched in public such as gas pumps, door handles, shopping carts, etc.  --Recommend yearly flu vaccination  --Recommend Covid 19 booster when available  --Recommend Pneumovax vaccination  --Have discussed signs and symptoms of COPD exacerbation and why it is important to monitor for bronchial infections. To call office should he develop signs of COPD exacerbation/bronchial infection  --I will continue to follow in pulmonary clinic    Return in about 4 weeks (around 9/22/2021). This dictation was performed with a verbal recognition program and it was checked for errors. It is possible that there are still dictated errors within this office note. Any errors should be brought immediately to my attention for correction. All efforts were made to ensure that this office note is accurate.        Electronically signed by POPEYE Caba CNP on 9/23/2021 at 4:59 PM

## 2021-08-25 NOTE — TELEPHONE ENCOUNTER
Pt is going to have scan done on 9/7 [facility] for CT-- 900 arrival for a 930 appt-- npo 4 hours-- pt is aware and stated understanding

## 2021-08-26 ENCOUNTER — CARE COORDINATION (OUTPATIENT)
Dept: CARE COORDINATION | Age: 76
End: 2021-08-26

## 2021-08-26 NOTE — CARE COORDINATION
Yrn 45 Transitions Follow Up Call    2021    Patient: Jah Yadav  Patient : 1945   MRN: <[id number]>  Reason for Admission: Progressive shortness of breath. He was found to have a right pneumothorax  Discharge Date: 21 RARS: Readmission Risk Score: 15         Spoke with: 140 [address] Sheppard Transitions Follow Up Call    Pt states doing well, breathing at baseline. O2 concentrator is in the works with [employer]. Pt is unsure if PFTs will be the test for this. Aware of upcoming appts. Kids are his transportation. Needs to be reviewed by the provider   Additional needs identified to be addressed with provider: Yes  pt needs help with O2 concentrator             Method of communication with provider : chart routing      Care Transition Nurse (CTN) contacted the patient by telephone to follow up after admission on 2021. Verified name and  with patient as identifiers. Addressed changes since last contact: O2 concentrator and ACP docs  Discussed follow-up appointments. If no appointment was previously scheduled, appointment scheduling offered: No.   Is follow up appointment scheduled within 7 days of discharge? Yes. Advance Care Planning:   Does patient have an Advance Directive: pt states he will meet with  to make changes due to wife passed away in March of this year. , not on file; education provided and decision maker updated. CTN reviewed discharge instructions, medical action plan and red flags with patient and discussed any barriers to care and/or understanding of plan of care after discharge. Discussed appropriate site of care based on symptoms and resources available to patient including: PCP, Specialist and When to call 911. The patient agrees to contact the PCP office for questions related to their healthcare. Patients top risk factors for readmission: medical condition-Progressive shortness of breath.  He was found to have a right pneumothorax  Interventions to address risk factors: Obtained and reviewed discharge summary and/or continuity of care documents      Non-[facility] follow up appointment(s): na    CTN provided contact information for future needs. Plan for follow-up call in 5-7 days based on severity of symptoms and risk factors. Plan for next call: referral to ambulatory care manager-please see last notes. Wife passed in March, pt has support of children. Needs to get O2 concentrator. Will add Pulmonologist ot care team. Wife was organizer of medical/financial things. Pt is agreeable. Care Transitions Subsequent and Final Call    Schedule Follow Up Appointment with PCP: Completed  Subsequent and Final Calls  Do you have any ongoing symptoms?: No  Have your medications changed?: No  Do you have any questions related to your medications?: No  Do you currently have any active services?: No  Do you have any needs or concerns that I can assist you with?: No  Identified Barriers: None  Care Transitions Interventions     Other Therapy Services: Declined     Senior Services: Declined      Smoking Cessation: Declined    Disease Specific Clinic: [facility]  Work: Declined    Other Interventions:            Follow Up  Future Appointments   Date Time Provider Andre Oakes   9/7/2021  9:30 AM [facility] CT ROOM 2 [facility] [address] [facility] Imaging   9/8/2021  9:30 AM SHERIN, MED ONC NURSE [facility] MED ONC [place]   9/8/2021  9:45 AM Luis Turcios MD [facility] CC [facility]   9/20/2021  4:00 PM [facility] PFT [facility]   9/23/2021  9:30 AM POPEYE Mcconnell CNP [facility] PULM [facility]   11/10/2021  8:15 AM Bard Crys MD [facility] E S IM [facility]   12/21/2021 10:00 AM POPEYE Mcconnell CNP [facility]       Lito Wallis RN

## 2021-08-26 NOTE — TELEPHONE ENCOUNTER
Celena Bocanegra, this may be more appropriately routed to his Pulmonologist    Phoebe and Paxton Ortega, also check w pt if we can f/u w him in a month, does not appear has f/u appt scheduled w me.   If having problems w O2 concentrator also check if we can assist.  He should also maintain eval w pulmonology

## 2021-08-28 PROBLEM — J93.9 PNEUMOTHORAX ON RIGHT: Status: ACTIVE | Noted: 2021-08-28

## 2021-08-28 PROBLEM — J96.11 CHRONIC RESPIRATORY FAILURE WITH HYPOXIA (HCC): Status: ACTIVE | Noted: 2021-08-28

## 2021-09-02 ENCOUNTER — CARE COORDINATION (OUTPATIENT)
Dept: CARE COORDINATION | Age: 76
End: 2021-09-02

## 2021-09-02 DIAGNOSIS — Z01.811 PREOP PULMONARY/RESPIRATORY EXAM: Primary | ICD-10-CM

## 2021-09-02 NOTE — CARE COORDINATION
often do you have trouble taking your medications the way you have been told to take them?: I always take them as prescribed. Oxygen Regimen: Continuous    Method of Delivery: Nasal Cannula         Current Housing: Private Residence        Per the Fall Risk Screening, did the patient have 2 or more falls or 1 fall with injury in the past year?: No     Frequent urination at night?: Yes  Do you use rails/bars?: No  Do you have a non-slip tub mat?: Yes     Are you experiencing loss of meaning?: No  Are you experiencing loss of hope and peace?: No     Thinking about your patient's physical health needs, are there any symptoms or problems (risk indicators) you are unsure about that require further investigation?: No identified areas of uncertainly or problems already being investigated   Are the patients physical health problems impacting on their mental well-being?: No identified areas of concern   Are there any problems with your patients lifestyle behaviors (alcohol, drugs, diet, exercise) that are impacting on physical or mental well-being?: No identified areas of concern   Do you have any other concerns about your patients mental well-being?  How would you rate their severity and impact on the patient?: No identified areas of concern   How would you rate their home environment in terms of safety and stability (including domestic violence, insecure housing, neighbor harassment)?: Consistently safe, supportive, stable, no identified problems   How do daily activities impact on the patient's well-being? (include current or anticipated unemployment, work, caregiving, access to transportation or other): No identified problems or perceived positive benefits   How would you rate their social network (family, work, friends)?: Good participation with social networks   How would you rate their financial resources (including ability to afford all required medical care)?: Financially secure, resources adequate, no identified problems   How wells does the patient now understand their health and well-being (symptoms, signs or risk factors) and what they need to do to manage their health?: Reasonable to good understanding and already engages in managing health or is willing to undertake better management   How well do you think your patient can engage in healthcare discussions? (Barriers include language, deafness, aphasia, alcohol or drug problems, learning difficulties, concentration): Clear and open communication, no identified barriers   Do other services need to be involved to help this patient?: Other care/services not required at this time   Are current services involved with this patient well-coordinated? (Include coordination with other services you are now recommendation): All required care/services in place and well-coordinated   Suggested Interventions and Community Resources  Fall Risk Prevention: In Process       Set up/Review an Education Plan, Set up/Review Goals              Prior to Admission medications    Medication Sig Start Date End Date Taking? Authorizing Provider   levothyroxine (SYNTHROID) 75 MCG tablet Take 1 tablet by mouth Daily 8/19/21  Yes Rhoda Higuera MD   ALPRAZolam Carlynn Remak) 0.25 MG tablet Take 1 tablet by mouth nightly as needed for Anxiety for up to 30 days.  8/3/21 9/2/21 Yes Rhoda Higuera MD   albuterol sulfate  (90 Base) MCG/ACT inhaler Inhale 2 puffs into the lungs every 6 hours as needed for Wheezing or Shortness of Breath 7/26/21  Yes Rhoda Higuera MD   nicotine (NICODERM CQ) 21 MG/24HR Place 1 patch onto the skin daily 7/27/21  Yes Flavia Samson MD   SYMBICORT 80-4.5 MCG/ACT AERO Inhale 2 puffs into the lungs 2 times daily 6/2/21  Yes Rhoda Higuera MD   metoprolol tartrate (LOPRESSOR) 25 MG tablet Take 0.5 tablets by mouth 2 times daily 5/10/21  Yes Rhoda Higuera MD   atorvastatin (LIPITOR) 10 MG tablet Take 1 tablet by mouth daily 5/10/21  Yes Meagan Kumar MD   Ipratropium-Albuterol (ALBUTEROL-IPRATROPIUM IN) Inhale 2 puffs into the lungs 4 times daily as needed    Yes Historical Provider, MD   OXYGEN Inhale into the lungs nightly   Yes Historical Provider, MD   Multiple Vitamins-Minerals (PRESERVISION AREDS PO) Take by mouth daily   Yes Historical Provider, MD       Future Appointments   Date Time Provider Andre Xavieri   2021  9:30 AM [facility] CT ROOM 2 [facility] Imaging   2021  9:30 AM SRMZ, MED ONC NURSE [facility] MED ONC [place]   2021  9:45 AM Luis Mckeon MD [facility] CC MMA   2021  4:00 PM [facility] PFT [facility] PFT [place]   2021  9:30 AM POPEYE Alva CNP [facility] PULM [facility]   11/10/2021  8:15 AM Meagan Kumar MD [facility] E S IM [facility]   2021 10:00 AM POPEYE Alva CNP [facility] PULM MMA     ,   COPD Assessment    Does the patient understand envrionmental exposure?: Yes  Is the patient able to verbalize Rescue vs. Long Acting medications?: Yes  Does the patient have a nebulizer?: Yes  Does the patient use a space with inhaled medications?: No            Symptoms:     Increase use of rapid acting/rescue inhaled medications?: No  Change in chronic cough?: No/At Baseline  Change in sputum?: No/At Baseline  Sputum characteristics: Clear, Thin  Self Monitoring - SaO2: Yes  Baseline SaO2 Readin  Have you had a recent diagnosis of pneumonia either by PCP or at a hospital?: No (Comment: Pneumothorax)      and   General Assessment    Do you have any symptoms that are causing concern?: No

## 2021-09-07 ENCOUNTER — HOSPITAL ENCOUNTER (OUTPATIENT)
Dept: CT IMAGING | Age: 76
Discharge: HOME | End: 2021-09-07
Payer: MEDICARE

## 2021-09-07 DIAGNOSIS — C34.31 MALIGNANT NEOPLASM OF LOWER LOBE OF RIGHT LUNG (HCC): ICD-10-CM

## 2021-09-07 LAB
GFR AFRICAN AMERICAN: >60 ML/MIN/1.73M2
GFR NON-AFRICAN AMERICAN: >60 ML/MIN/1.73M2
POC CREATININE: 0.7 MG/DL (ref 0.9–1.3)

## 2021-09-07 PROCEDURE — 6360000004 HC RX CONTRAST MEDICATION: Performed by: INTERNAL MEDICINE

## 2021-09-07 PROCEDURE — 71260 CT THORAX DX C+: CPT

## 2021-09-07 RX ORDER — SODIUM CHLORIDE 0.9 % (FLUSH) 0.9 %
5-40 SYRINGE (ML) INJECTION PRN
Status: DISCONTINUED | OUTPATIENT
Start: 2021-09-07 | End: 2021-09-08 | Stop reason: HOSPADM

## 2021-09-07 RX ADMIN — IOPAMIDOL 75 ML: 755 INJECTION, SOLUTION INTRAVENOUS at 09:45

## 2021-09-07 NOTE — PROGRESS NOTES
Patient Name: Shane Brothers  Patient : 1945  Patient MRN: [id number]     Primary Oncologist: Otis Rubio MD  Referring Provider: Catie Coronado MD     Date of Service: 2021      Chief Complaint:   Chief Complaint   Patient presents with    Follow-up     Patient Active Problem List:     Malignant neoplasm of lower lobe of right lung      SCC (squamous cell carcinoma of lung), right     HPI:   Mr. Nicolas Novak is a 30-year-old very pleasant gentleman with medical history significant for hypertension, COPD, hyperlipidemia, peripheral arterial disease, coronary artery disease, history of anal canal squamous cell carcinoma, status post chemoradiation therapy (completed in 2009), initially referred to me on 2018 for evaluation of clinical stage IA1 right lower lobe squamous cell lung cancer. He stated that he has screening low dose CT scan of the chest (ordered by Dr. Estephanie Cordoba) on 2018 and it showed 10 mm ground glass left lower lobe nodule, which is new since . Subsequently he had PET/CT scan on 18 and it showed metabolically active 5 mm nodule in the right lower lobe, potentially malignant. There was no FDG activity associated with the ground glass left lower lobe nodule seen on prior CT scan. Repeat CT scan on 18 showed increasing irregular right lower lobe nodule 7.9 x 7.2 mm, considered neoplastic until proven otherwise. Resolution of left lower lobe ground glass density. He subsequently underwent CT guided biopsy of right lower lobe lung mass on 18 and final pathology was consistent with squamous cell carcinoma. He was subsequently referred to me for evaluation. PET/CT scan done on 2018 showed slightly increased size and increased uptake of the biopsy-proven malignancy in the right lower lobe. No findings of metastatic disease.     MRI of the brain with and without contrast done on 1/3/19 showed No acute intracranial abnormality. No intracranial metastatic disease. 2. Age-related parenchymal volume loss and mild chronic microvascular ischemic changes. He was seen by cardio thoracic surgeon and they don't think he is a candidate for surgery. He was subsequently seen by Dr. Minerva Villanueva and he was treated with stereotactic body radiation therapy between February 19 and February 26, 2019. CT scan of the chest with contrast done on May 28, 2019 showed Slight interval decrease in size of a 5 mm right lower lobe pulmonary nodule which was previously biopsied. No new findings of metastatic disease in the chest.  He has moderate to severe emphysema. CT scan of the chest done on November 29, 2019 showed with the exception of a new 5 mm right lower lobe pulmonary nodule, the remainder of pulmonary nodules are not significantly changed as compared to prior, for which continued follow-up is recommended per protocol. Basilar mucous plugging. Atherosclerosis, including coronary artery calcification. Progressive compression deformities of T7 and T8 vertebral bodies as compared to prior as well as fracture of T7 spinous process. CT scan of the chest done on 6/4/20 showed no CT scan evidence of intrathoracic metastatic disease. Previously identified right lower lobe 5 mm pulmonary nodule has resolved, likely related to atelectasis. Severe emphysema. Minor mucous plugging in the right lower lobe again noted. CT scan of the chest with contrast done on December 1, 2020 showed interval appearance of a focal opacity in the superior segment of the right upper lobe. The finding is nonspecific and may represent infectious/inflammatory etiology, posttreatment changes [favored], or recurrence. Multiple irregular nodular opacities scattered throughout the lungs which may represent infectious/inflammatory etiology [favored] versus metastatic disease. The nodules are too small for PET CT characterization and biopsy.     CT scan of the chest done on June 1, 2021 showed similar evolving radiation fibrosis of the right upper lobe. Spiculated part solid nodule of the right upper lobe measuring 1.8 cm with 0.3 cm solid component suspicious for evolving adenocarcinoma-spectrum lesion. No interval change in additional bilateral subcentimeter and pericentimeter groundglass and solid nodules. These also may represent evolving adenocarcinoma spectrum lesions and warrant attention on follow-up. CT chest with contrast done on September 7, 2021 showed similar posttreatment change in the right lower lobe without evidence of disease. Unchanged 8 mm groundglass nodule of the right upper lobe and increased in size now 1.2 cm groundglass nodule of the left upper lobe. On September 8, 2021, he presented to me for follow-up. I have been following him for Stage IA1 right lower lobe squamous cell lung cancer and he is status post stereotactic body radiation therapy. He has been under close observation since then. On today's visit, I reviewed with him findings of CT scan of the chest done on 9/7/2021 and we looked at his CT scans together. I recognize that he has enlarging small groundglass nodule in the left upper lobe. It is most consistent with infectious process than malignancy. I recommend him to have repeat CT chest in 6 months. I will set up for CT chest in 6 months and I will see him again after that. Further management will be based on findings on CT scan. Hypertension - his BP is normal. Recommend to continue with metoprolol. Hypothyroidism - stable and recommend to continue with levothyroxine 75 mcg daily. COPD - still smoking. Encourage him to quit smoking. To continue with symbicort and bronchodilator nebulizer. Hyperlipidemia - stable on lipitor 10 mg daily. Health maintenance - I recommend him to have age-appropriate cancer screening, exercise, low-fat and low-sodium diet.     He does not have any significant symptoms at today's visit. Past Medical History  Significant for  1. Hypertension  2. Hyperlipidemia  3. COPD  4. Peripheral arterial disease  5. Coronary artery disease  6. History of anal canal squamous cell carcinoma, status post concurrent chemoradiation therapy (on remission)    Surgical History  Significant for   1. Hemorrhoid surgery  2. Chemoport placement  3. Cataract surgery  4. Tonsillectomy  5. Colectomy in 2009 by Dr. Natalie Sanabria  6. Angioplasty of bilateral legs by Dr. Cruz Hind    Allergies  No known medication allergies    Social History  He is a current smoker and he smokes approximately 1 pack/day for last 60 years. He drinks alcohol daily and denies illicit drug abuse. He is currently living in [facility]. Family History  Significant for pancreatic cancer in 1 of his brother. No other pertinent family history. Oncology History   Malignant neoplasm of lower lobe of right lung ([facility] [address]   1/8/2019 Initial Diagnosis    Malignant neoplasm of lower lobe of right lung ([facility] [address]     2/19/2019 - 2/26/2019 Radiation    Right lower lobe: 54 trey in 3 fractions SBRT       Review of Systems: \"Per interval history; otherwise 10 point ROS is negative. \"  His energy level is fair, appetite, and sleep are good. He denies fever, chills, night sweats, cough, shortness of breath, chest pain, hemoptysis or palpitations. His bowel and bladder functions are normal. He denies nausea, vomiting, abdominal pain, diarrhea, constipation, dysuria, loss of appetite or weight loss. He doesn't have neuropathy and he denies bleeding or clotting issues. He denies any pain in his body. Denies anxiety or depression. The rest of the systems are unremarkable.     Vital Signs:  BP 95/69 (Site: Right Wrist, Position: Sitting)   Pulse 80   Temp 98.6 °F (37 °C) (Temporal)   Resp 18   Ht 5' 7\" (1.702 m)   Wt 115 lb 3.2 oz (52.3 kg)   SpO2 97%   BMI 18.04 kg/m²     Physical Exam:  CONSTITUTIONAL: awake, no apparent distress, alert, cooperative,   EYES: pupils equal, round and reactive to light, sclera clear and conjunctiva normal  ENT: Normocephalic, without obvious abnormality, atraumatic  NECK: supple, symmetrical, no jugular venous distension and no carotid bruits   HEMATOLOGIC/LYMPHATIC: no cervical, supraclavicular or axillary lymphadenopathy   LUNGS: VBS, no wheezes, no rhonchi, no increased work of breathing, no crackles, clear to auscultation   CARDIOVASCULAR: regular rate and rhythm, normal S1 and S2, no murmur noted  ABDOMEN: soft, non-distended, normal bowel sounds x 4, non-tender, no masses palpated, no hepatosplenomegaly   MUSCULOSKELETAL: full range of motion noted, tone is normal  NEUROLOGIC: awake, alert, oriented to name, place and time. Motor skills grossly intact. SKIN: Normal skin color, texture, turgor and no jaundice.  appears intact   EXTREMITIES: no leg swelling, no clubbing, no cyanosis, no LE edema,      Labs:  Hematology:  Lab Results   Component Value Date    WBC 9.0 07/23/2021    RBC 4.15 (L) 07/23/2021    HGB 13.4 (L) 07/23/2021    HCT 41.1 (L) 07/23/2021    MCV 99.0 07/23/2021    MCH 32.3 (H) 07/23/2021    MCHC 32.6 07/23/2021    RDW 13.5 07/23/2021     07/23/2021    MPV 9.4 07/23/2021    BANDSPCT 5 02/27/2012    SEGSPCT 86.7 (H) 07/22/2021    EOSRELPCT 0.2 07/22/2021    BASOPCT 0.3 07/22/2021    LYMPHOPCT 5.3 (L) 07/22/2021    MONOPCT 6.8 (H) 07/22/2021    BANDABS 0.32 02/27/2012    SEGSABS 10.3 07/22/2021    EOSABS 0.0 07/22/2021    BASOSABS 0.0 07/22/2021    LYMPHSABS 0.6 07/22/2021    MONOSABS 0.8 07/22/2021    DIFFTYPE AUTOMATED DIFFERENTIAL 07/22/2021    PLTM FEW LARGE PLATELET FORMS SEEN 46/83/8532     No results found for: ESR  Chemistry:  Lab Results   Component Value Date     07/23/2021    K 4.6 07/23/2021    CL 98 (L) 07/23/2021    CO2 25 07/23/2021    BUN 21 07/23/2021    CREATININE 0.7 (L) 09/07/2021    GLUCOSE 150 (H) 07/23/2021    CALCIUM 8.9 07/23/2021    PROT 6.9 07/22/2021 LABALBU 4.6 07/22/2021    BILITOT 0.5 07/22/2021    ALKPHOS 71 07/22/2021    AST 24 07/22/2021    ALT 27 07/22/2021    LABGLOM >60 09/07/2021    GFRAA >60 09/07/2021    AGRATIO 2.4 (H) 05/10/2021    GLOB 1.9 05/10/2021    POCGLU 171 (H) 07/22/2021     No results found for: MMA, LDH, HOMOCYSTEINE  No components found for: LD  Lab Results   Component Value Date    TSHHS 4.200 12/01/2020    T4FREE 1.08 08/17/2021     Immunology:  Lab Results   Component Value Date    PROT 6.9 07/22/2021     No results found for: Elina Villagomez, KLFLCR  No results found for: B2M  Coagulation Panel:  Lab Results   Component Value Date    PROTIME 11.2 (L) 07/22/2021    INR 0.87 07/22/2021    APTT 24.9 12/07/2018     Anemia Panel:  No results found for: Karmenflora Milner, FOLATE  Tumor Markers:  No results found for: , CEA, , LABCA2, PSA  Observations:  No data recorded     Assessment & Plan:   Stage IA1 right lower lobe squamous cell lung cancer    PLAN  Mr. Dat Anderson is a 67year old very pleasant gentleman who was found to have 1 cm ground glass nodule in his left lower lobe on screening low dose CT scan, done on July 20, 2018. Subsequently, he had PET/CT scan and it showed metabolically active 5 mm nodule in right lower lobe, potentially malignant. No metabolic activity in left lower lobe ground glass nodule. Repeat CT scan of the chest on November 14, 2018 showed increased in size of right lower lobe lung mass, considered neoplastic until proven otherwise. Subsequently had CT guided biopsy of the right lower lobe lung mass on Decmeber 7, 2018 and final pathology was consistent with squamous cell carcinoma. PET/CT scan done on December 27, 2018 showed slightly increased size and increased uptake of the biopsy-proven malignancy in the right lower lobe. No findings of metastatic disease. MRI of the brain with and without contrast done on 1/3/19 showed No acute intracranial abnormality.  No intracranial metastatic disease. 2. Age-related parenchymal volume loss and mild chronic microvascular ischemic changes. He was seen by cardio thoracic surgeon and they don't think he is a candidate for surgery. He was subsequently seen by Dr. Bita Lebron and he was treated with stereotactic body radiation therapy between February 19 and February 26, 2019. He has been in a close observation since then. CT chest with contrast done on September 7, 2021 showed similar posttreatment change in the right lower lobe without evidence of disease. Unchanged 8 mm groundglass nodule of the right upper lobe and increased in size now 1.2 cm groundglass nodule of the left upper lobe. On September 8, 2021, he presented to me for follow-up. I have been following him for Stage IA1 right lower lobe squamous cell lung cancer and he is status post stereotactic body radiation therapy. He has been under close observation since then. On today's visit, I reviewed with him findings of CT scan of the chest done on 9/7/2021 and we looked at his CT scans together. I recognize that he has enlarging small groundglass nodule in the left upper lobe. It is most consistent with infectious process than malignancy. I recommend him to have repeat CT chest in 6 months. I will set up for CT chest in 6 months and I will see him again after that. Further management will be based on findings on CT scan. Hypertension - his BP is normal. Recommend to continue with metoprolol. Hypothyroidism - stable and recommend to continue with levothyroxine 75 mcg daily. COPD - still smoking. Encourage him to quit smoking. To continue with symbicort and bronchodilator nebulizer. Hyperlipidemia - stable on lipitor 10 mg daily. Health maintenance - I recommend him to have age-appropriate cancer screening, exercise, low-fat and low-sodium diet. I answered all his questions and concerns for today.  I asked him to follow-up with primary care physician,  Lebron, and Dr. Everardo Blank on regular basis. Recent imaging and labs were reviewed and discussed with the patient.

## 2021-09-08 ENCOUNTER — HOSPITAL ENCOUNTER (OUTPATIENT)
Dept: INFUSION THERAPY | Age: 76
Discharge: HOME | End: 2021-09-08
Payer: MEDICARE

## 2021-09-08 ENCOUNTER — OFFICE VISIT (OUTPATIENT)
Dept: ONCOLOGY | Age: 76
End: 2021-09-08
Payer: MEDICARE

## 2021-09-08 VITALS
HEIGHT: 67 IN | BODY MASS INDEX: 18.08 KG/M2 | SYSTOLIC BLOOD PRESSURE: 95 MMHG | TEMPERATURE: 98.6 F | DIASTOLIC BLOOD PRESSURE: 69 MMHG | HEART RATE: 80 BPM | WEIGHT: 115.2 LBS | OXYGEN SATURATION: 97 % | RESPIRATION RATE: 18 BRPM

## 2021-09-08 DIAGNOSIS — C34.91 SQUAMOUS CELL LUNG CANCER, RIGHT (HCC): Primary | ICD-10-CM

## 2021-09-08 PROCEDURE — 3017F COLORECTAL CA SCREEN DOC REV: CPT | Performed by: INTERNAL MEDICINE

## 2021-09-08 PROCEDURE — 99211 OFF/OP EST MAY X REQ PHY/QHP: CPT

## 2021-09-08 PROCEDURE — 4040F PNEUMOC VAC/ADMIN/RCVD: CPT | Performed by: INTERNAL MEDICINE

## 2021-09-08 PROCEDURE — 4004F PT TOBACCO SCREEN RCVD TLK: CPT | Performed by: INTERNAL MEDICINE

## 2021-09-08 PROCEDURE — G8419 CALC BMI OUT NRM PARAM NOF/U: HCPCS | Performed by: INTERNAL MEDICINE

## 2021-09-08 PROCEDURE — G8427 DOCREV CUR MEDS BY ELIG CLIN: HCPCS | Performed by: INTERNAL MEDICINE

## 2021-09-08 PROCEDURE — 1123F ACP DISCUSS/DSCN MKR DOCD: CPT | Performed by: INTERNAL MEDICINE

## 2021-09-08 PROCEDURE — 99214 OFFICE O/P EST MOD 30 MIN: CPT | Performed by: INTERNAL MEDICINE

## 2021-09-08 RX ORDER — LEVOTHYROXINE SODIUM 0.03 MG/1
TABLET ORAL
COMMUNITY
Start: 2021-09-02 | End: 2021-09-08

## 2021-09-08 NOTE — PROGRESS NOTES
MA Rooming Questions  Patient: Gilbert Borja  MRN: [id number]    Date: 9/8/2021        1. Do you have any new issues? COLLAPSED LUNG         2. Do you need any refills on medications?    no    3. Have you had any imaging done since your last visit? yes -     4. Have you been hospitalized or seen in the emergency room since your last visit here?   yes -     5. Did the patient have a depression screening completed today?  No    No data recorded     PHQ-9 Given to (if applicable):               PHQ-9 Score (if applicable):                     [] Positive     []  Negative              Does question #9 need addressed (if applicable)                     [] Yes    []  No               Roma Gonzalez CMA

## 2021-09-20 ENCOUNTER — HOSPITAL ENCOUNTER (OUTPATIENT)
Dept: LAB | Age: 76
Discharge: HOME | End: 2021-09-20
Payer: MEDICARE

## 2021-09-20 ENCOUNTER — HOSPITAL ENCOUNTER (OUTPATIENT)
Dept: PULMONOLOGY | Age: 76
Discharge: HOME | End: 2021-09-20
Payer: MEDICARE

## 2021-09-20 PROCEDURE — U0003 INFECTIOUS AGENT DETECTION BY NUCLEIC ACID (DNA OR RNA); SEVERE ACUTE RESPIRATORY SYNDROME CORONAVIRUS 2 (SARS-COV-2) (CORONAVIRUS DISEASE [COVID-19]), AMPLIFIED PROBE TECHNIQUE, MAKING USE OF HIGH THROUGHPUT TECHNOLOGIES AS DESCRIBED BY CMS-2020-01-R: HCPCS

## 2021-09-20 PROCEDURE — U0005 INFEC AGEN DETEC AMPLI PROBE: HCPCS

## 2021-09-20 PROCEDURE — 94618 PULMONARY STRESS TESTING: CPT

## 2021-09-20 NOTE — PROGRESS NOTES
9/20/2021 3:25 PM  Patient Room #: Room/bed info not found  Patient Name: David Linn    (Step 1 Done by RN if possible otherwise call Pulmonary Diagnostics)  1. Place patient on room air at rest for at least 30 minutes. If patient falls below 88% before 30 minutes then you can record the level and stop. Record room air saturation level _95_ %. If patient is at 88% or below, they will qualify for home oxygen and you can stop. If level does not fall below 88%, fill in level above. If indicated continue to Step 2. Signature:__Mansoor Mathew RRT___ Date: 09/20/2021_  (Step 2&3 Done by [facility])  2. Ambulate patient on room air until saturation falls below 89%. Record level of room air saturation with ambulation_87 %. Next, place patient back on _2__lpm oxygen and ambulate, record level _92_%. (Note:  this level must show improvement from room air level done with ambulation.)  If patients saturation on room air with ambulation is 88% or below AND patient shows improvement with oxygen during ambulation, they will qualify for home oxygen and you can stop. If patient does not drop below 89%, then patient should have an overnight oximetry trending on room air to see if level falls below 88%. Complete level in Step 3 below. 3. Room air overnight oximetry level 88 % for___  cumulative minutes. If patients room air oxygen level is < 89% for at least 5 cumulative minutes, patient will qualify for home oxygen and you can stop. (Attach Night Trending Report)    Complete order below: Diagnosis:_COPD__  Home oxygen at:  Length of Need: X? Lifetime ?  3 Months     _2__lpm or __%   via  [x] nasal cannula  []mask  [x] other:_Conserving Device_         []continuous [x]  with activity  []  Nocturnal   [x] Portable Tanks [x]  Concentrator        Therapist Signature:__Mansoor Mathew RRT___     Date:  09/20/2021___    Physician Signature:  _____________________    Date:________  Physician Printed Name:  Milka Patel MD___   NPI # _[id number]__    [x] Patient Qualifies      [] Patient Does NOT qualify

## 2021-09-20 NOTE — PROGRESS NOTES
Patient was seen  for  COPD. I am prescribing oxygen because the diagnosis and testing requires the patient to have oxygen in the home. Conditions will improve or be benefited by oxygen use. The patient is able to perform good mobility and therefore requires the use of a portable oxygen system for ambulation.

## 2021-09-22 LAB
SARS-COV-2: NOT DETECTED
SOURCE: NORMAL

## 2021-09-23 ENCOUNTER — VIRTUAL VISIT (OUTPATIENT)
Dept: PULMONOLOGY | Age: 76
End: 2021-09-23
Payer: MEDICARE

## 2021-09-23 DIAGNOSIS — J96.11 CHRONIC RESPIRATORY FAILURE WITH HYPOXIA (HCC): ICD-10-CM

## 2021-09-23 DIAGNOSIS — J44.9 CHRONIC OBSTRUCTIVE PULMONARY DISEASE, UNSPECIFIED COPD TYPE (HCC): Primary | ICD-10-CM

## 2021-09-23 DIAGNOSIS — Z00.00 HEALTHCARE MAINTENANCE: ICD-10-CM

## 2021-09-23 DIAGNOSIS — C34.91 SCC (SQUAMOUS CELL CARCINOMA OF LUNG), RIGHT (HCC): ICD-10-CM

## 2021-09-23 PROCEDURE — 99214 OFFICE O/P EST MOD 30 MIN: CPT | Performed by: NURSE PRACTITIONER

## 2021-09-23 PROCEDURE — 4040F PNEUMOC VAC/ADMIN/RCVD: CPT | Performed by: NURSE PRACTITIONER

## 2021-09-23 PROCEDURE — 3017F COLORECTAL CA SCREEN DOC REV: CPT | Performed by: NURSE PRACTITIONER

## 2021-09-23 PROCEDURE — 1123F ACP DISCUSS/DSCN MKR DOCD: CPT | Performed by: NURSE PRACTITIONER

## 2021-09-23 PROCEDURE — G8427 DOCREV CUR MEDS BY ELIG CLIN: HCPCS | Performed by: NURSE PRACTITIONER

## 2021-09-23 NOTE — PROGRESS NOTES
[place] [address] Pulmonary   Telephone Visit Note      Cedrick Presley is a 76 y.o. male evaluated via my chart telephone visit on 9/23/2021. Consent:  Pursuant to emergency declaration under the [address] in the [employer], 1135 waiver authorization in the [facility] Act, this telephone visit was completed with patient's consent, to reduce the patient's risk of exposure to COVID-19 and provide necessary medical care. He and/or health care decision maker is aware that that he may receive a bill for this telephone service, depending on his insurance coverage, and has provided verbal consent to proceed. Patient is at his residency and Provider is currently in Pulmonary Clinic at [place] [address] Pulmonary. Documentation:  Niurka Chapman was initially seen in consult on 8/25/2021 for COPD, shortness of breath, SCC lung. He did complete a CT lung screening on 9/7/2021 which demonstrated posttreatment changes in the right lower lobe without evidence of disease. Moderate to severe emphysema was noted, there was an unchanged 8 mm groundglass nodule in the RUL, and an increase in groundglass nodule in BETH which now measures 1.2 cm previously measuring 0.7 cm. Dr. David Bernabe has already ordered a follow-up CT scan. He has not completed his pulmonary function test yet. Testing is scheduled for tomorrow. His Covid test was completed last week and was negative unfortunately the results were not available in a timely fashion and therefore his pulmonary function test had to be rescheduled. He is currently on Symbicort and albuterol he denies refill of either medication at this time. He is using his Symbicort twice a day and his albuterol sometimes 2-3 times a day at most.  Based on CT findings and the frequency of use of his albuterol rescue inhaler I am anticipating a change in his bronchodilator therapy however I will wait until PFT test are available.   He states he has at least one refill on his Symbicort at this time and is only I have asked him not to refill any additional Symbicort until we talk after pulmonary function test results are known. Mikal Fail states he has not required hospitalization, visits to the emergency room, walk-in clinic, urgent care for respiratory related illness since last visit 8/25/2021. Mikal Fail states they are practicing social distancing, wearing a mask when out in public, washing hands frequently or using hand . Denies any fever, chills, malaise, change in sensation of taste or smell, headache or lightheadedness. Denies any known contacts with persons with respiratory infection, positive for coronavirus or under investigation for possible coronavirus exposure.     Influenza immunization: 9/17/2020  Pneumococcal immunization: Prevnar 13 on 10/31/2016  COVID-19 immunization: 4/20/2021, 3/30/2021-Pfizer  Smoking history: Current smoker with 60-year pack history  PCP: Guerline Phan MD    Past Medical History:   Diagnosis Date    Atherosclerosis of aortic arch ([address]     NOTED ON CXR 1/2017- CONSIDER CARDIO CONSULT    Atypical chest pain     COPD (chronic obstructive pulmonary disease) ([address]     follow with Dr Brian Shahid- chantix ineffective    Essential hypertension 01/29/2018    Full dentures     History of external beam radiation therapy 2009/2019    pelvis/anus in 2009 and right lower lobe lung mass SBRT in 2019 per Dr. Libertad Lewis at [address] [facility] Hyperlipidemia, mixed     Hypertension     Hypothyroidism due to acquired atrophy of thyroid 05/11/2021    ANTIPEROXIDASE ANTIBODY NEGATIVE    On home oxygen therapy     2l/nc at night and prn    PAD (peripheral artery disease) ([address]     R leg on doppler 8/2017-- referring to Dr Soraya Ingram Primary lung squamous cell carcinoma, right ([address] 12/2018    Dr Cherene Kehr, Dr Brian Shahid, Dr Libertad Lewis- treated w RT,    Pulmonary nodule, left 07/2018    Rectal cancer [facility]) 2009    Dr Modesta Bernal oncology, seen by GI at [facility]- Dr Leida Varghese- tx with chemo and radiation     S/P colonoscopy 02/01/2017    Dr Kiersten Carrera at [facility]-  diverticulosis and angioectasis, being referred to surgeon--CONSIDER RECHECK 11 YRS    SCC (squamous cell carcinoma of lung), right [facility])     Dr Kassie Mortimer, periodically Dr Rashida Keenan.  had resection w Dr Igor Khalil Subclavian artery stenosis, right [facility])    Ganesh Thurman Wears glasses        Medication and allergies have been reviewed    Social and family history are unchanged since last visit    ROS:   Constitutional: Denies fever, denies chills   Cardiovascular: Denies chest pain, denies palpitations. Pulmonary: Denies cough, denies SOB, denies wheeze    Physical exam not complete due to telephone visit  Alert and oriented  No conversational dyspnea, no cough, no audible wheeze  Normal mentation     Radiology  CT chest with contrast 9/27/2021  Mediastinum: Moderate coronary artery calcification.  No pericardial   effusion.  No lymphadenopathy.  No pericardial effusion.       Lungs/pleura: Moderate to severe emphysema.  Similar posttreatment change in   the right lower lobe.  Unchanged 8 mm ground-glass nodule of the right upper   lobe on series 4, image 94.  Apparent increase in size of ground-glass nodule   of the left upper lobe now measuring 1.2 cm on series 4, image 105,   previously 0.7 cm.  Bandlike subpleural scarring versus atelectasis in the   right middle lobe.  No new suspicious lesions.       Upper Abdomen: Similar low-density thickening of the left adrenal gland   without discrete measurable mass lesion.  Similar mild dilatation of the main   pancreatic duct to 4 mm, partially imaged.  This finding appears new relative   to PET-CT dated 12/27/2018.       Soft Tissues/Bones: Diffuse osteopenia.  Similar moderate compression   deformities of T7 and T8.     CT chest portable 7/26/2021  Interval removal of right chest tube.  No residual or recurrent pneumothorax.    No pleural effusion.  Unchanged cardiomediastinal silhouette.  Upper lobe   predominant centrilobular emphysema.  Area of right perihilar linear scarring   is unchanged.  No new, focal airspace consolidation.  Subcutaneous emphysema   in the right chest wall is unchanged. Diagnosis:    ICD-10-CM    1. Chronic obstructive pulmonary disease, unspecified COPD type ([facility]  J44.9    2. SCC (squamous cell carcinoma of lung), right ([facility])  C34.91    3. Chronic respiratory failure with hypoxia ([facility])  J96.11    4. Healthcare maintenance  Z00.00           Plan:  --I discussed with Liz Gomez recent radiology findings. Dr. Kassie Mortimer is following his lung cancer and is ordering yearly CT scans of the chest.  He is aware of enlarged nodule lung nodule in right upper lobe. --I will call him after pulmonary function tests are known to determine if changes in his bronchodilator therapy are needed. At this time we will continue his Symbicort and his albuterol rescue inhaler. I am anticipating making a change in his bronchodilator therapy following pulmonary function test based on findings on CT and increased use of albuterol rescue inhaler daily. --Encouraged to wear his oxygen at night, 6-minute walk test to be completed when he completes pulmonary function test tomorrow to assess for oxygen need during the day. --While he has been vaccinated at this time for COVID-19 I strongly recommend that he continue Coronavirus precaution including: Wearing mask when in public and when in contact with persons who have not been immunized, social distancing when needing to be in public, handwashing practice, wiping items touched in public such as gas pumps, door handles, shopping carts, etc.  --Recommend yearly flu vaccination  --Recommend Covid 19 booster when available  --Recommend Pneumovax vaccination  --Have discussed signs and symptoms of COPD exacerbation and why it is important to monitor for bronchial infections.   To call office should he develop signs of COPD exacerbation/bronchial infection  --I will continue to follow in pulmonary clinic

## 2021-09-24 ENCOUNTER — HOSPITAL ENCOUNTER (OUTPATIENT)
Dept: PULMONOLOGY | Age: 76
Discharge: HOME | End: 2021-09-24
Payer: MEDICARE

## 2021-09-24 DIAGNOSIS — C34.91 SCC (SQUAMOUS CELL CARCINOMA OF LUNG), RIGHT (HCC): ICD-10-CM

## 2021-09-24 DIAGNOSIS — J44.9 CHRONIC OBSTRUCTIVE PULMONARY DISEASE, UNSPECIFIED COPD TYPE (HCC): ICD-10-CM

## 2021-09-24 LAB
DLCO %PRED: 26 %
DLCO PRED: NORMAL
DLCO/VA %PRED: NORMAL
DLCO/VA PRED: NORMAL
DLCO/VA: NORMAL
DLCO: NORMAL
EXPIRATORY TIME-POST: NORMAL
EXPIRATORY TIME: NORMAL
FEF 25-75% %CHNG: NORMAL
FEF 25-75% %PRED-POST: NORMAL
FEF 25-75% %PRED-PRE: NORMAL
FEF 25-75% PRED: NORMAL
FEF 25-75%-POST: NORMAL
FEF 25-75%-PRE: NORMAL
FEV1 %PRED-POST: 28 %
FEV1 %PRED-PRE: 21 %
FEV1 PRED: NORMAL
FEV1-POST: NORMAL
FEV1-PRE: NORMAL
FEV1/FVC %PRED-POST: NORMAL
FEV1/FVC %PRED-PRE: NORMAL
FEV1/FVC PRED: NORMAL
FEV1/FVC-POST: 48 %
FEV1/FVC-PRE: 44 %
FVC %PRED-POST: NORMAL
FVC %PRED-PRE: NORMAL
FVC PRED: NORMAL
FVC-POST: NORMAL
FVC-PRE: NORMAL
GAW %PRED: NORMAL
GAW PRED: NORMAL
GAW: NORMAL
IC %PRED: NORMAL
IC PRED: NORMAL
IC: NORMAL
MEP: NORMAL
MIP: NORMAL
MVV %PRED-PRE: NORMAL
MVV PRED: NORMAL
MVV-PRE: NORMAL
PEF %PRED-POST: NORMAL
PEF %PRED-PRE: NORMAL
PEF PRED: NORMAL
PEF%CHNG: NORMAL
PEF-POST: NORMAL
PEF-PRE: NORMAL
RAW %PRED: NORMAL
RAW PRED: NORMAL
RAW: NORMAL
RV %PRED: NORMAL
RV PRED: NORMAL
RV: NORMAL
SVC %PRED: NORMAL
SVC PRED: NORMAL
SVC: NORMAL
TLC %PRED: 114 %
TLC PRED: NORMAL
TLC: NORMAL
VA %PRED: NORMAL
VA PRED: NORMAL
VA: NORMAL
VTG %PRED: NORMAL
VTG PRED: NORMAL
VTG: NORMAL

## 2021-09-24 PROCEDURE — 94727 GAS DIL/WSHOT DETER LNG VOL: CPT

## 2021-09-24 PROCEDURE — 94060 EVALUATION OF WHEEZING: CPT

## 2021-09-24 PROCEDURE — 94729 DIFFUSING CAPACITY: CPT

## 2021-09-24 ASSESSMENT — PULMONARY FUNCTION TESTS
FEV1_PERCENT_PREDICTED_POST: 28
FEV1/FVC_PRE: 44
FEV1/FVC_POST: 48
FEV1_PERCENT_PREDICTED_PRE: 21

## 2021-09-26 DIAGNOSIS — J42 CHRONIC BRONCHITIS, UNSPECIFIED CHRONIC BRONCHITIS TYPE (HCC): ICD-10-CM

## 2021-09-27 RX ORDER — DILTIAZEM HYDROCHLORIDE 60 MG/1
TABLET, FILM COATED ORAL
Qty: 30.6 EACH | Refills: 1 | Status: SHIPPED | OUTPATIENT
Start: 2021-09-27 | End: 2021-11-10 | Stop reason: ALTCHOICE

## 2021-10-01 ENCOUNTER — CARE COORDINATION (OUTPATIENT)
Dept: CARE COORDINATION | Age: 76
End: 2021-10-01

## 2021-10-01 ENCOUNTER — TELEPHONE (OUTPATIENT)
Dept: PULMONOLOGY | Age: 76
End: 2021-10-01

## 2021-10-01 DIAGNOSIS — J44.9 CHRONIC OBSTRUCTIVE PULMONARY DISEASE, UNSPECIFIED COPD TYPE (HCC): Primary | ICD-10-CM

## 2021-10-01 RX ORDER — BUDESONIDE, GLYCOPYRROLATE, AND FORMOTEROL FUMARATE 160; 9; 4.8 UG/1; UG/1; UG/1
2 AEROSOL, METERED RESPIRATORY (INHALATION) 2 TIMES DAILY
Qty: 1 EACH | Refills: 5 | Status: SHIPPED | OUTPATIENT
Start: 2021-10-01 | End: 2021-10-31

## 2021-10-01 SDOH — HEALTH STABILITY: MENTAL HEALTH: HOW OFTEN DO YOU HAVE A DRINK CONTAINING ALCOHOL?: NEVER

## 2021-10-01 SDOH — SOCIAL STABILITY: SOCIAL NETWORK: HOW OFTEN DO YOU GET TOGETHER WITH FRIENDS OR RELATIVES?: ONCE A WEEK

## 2021-10-01 SDOH — SOCIAL STABILITY: SOCIAL NETWORK
DO YOU BELONG TO ANY CLUBS OR ORGANIZATIONS SUCH AS CHURCH GROUPS UNIONS, FRATERNAL OR ATHLETIC GROUPS, OR SCHOOL GROUPS?: NO

## 2021-10-01 SDOH — SOCIAL STABILITY: SOCIAL NETWORK: IN A TYPICAL WEEK, HOW MANY TIMES DO YOU TALK ON THE PHONE WITH FAMILY, FRIENDS, OR NEIGHBORS?: TWICE A WEEK

## 2021-10-01 SDOH — HEALTH STABILITY: PHYSICAL HEALTH: ON AVERAGE, HOW MANY DAYS PER WEEK DO YOU ENGAGE IN MODERATE TO STRENUOUS EXERCISE (LIKE A BRISK WALK)?: 3 DAYS

## 2021-10-01 SDOH — ECONOMIC STABILITY: TRANSPORTATION INSECURITY
IN THE PAST 12 MONTHS, HAS THE LACK OF TRANSPORTATION KEPT YOU FROM MEDICAL APPOINTMENTS OR FROM GETTING MEDICATIONS?: NO

## 2021-10-01 SDOH — ECONOMIC STABILITY: INCOME INSECURITY: HOW HARD IS IT FOR YOU TO PAY FOR THE VERY BASICS LIKE FOOD, HOUSING, MEDICAL CARE, AND HEATING?: NOT HARD AT ALL

## 2021-10-01 SDOH — SOCIAL STABILITY: SOCIAL NETWORK: HOW OFTEN DO YOU ATTEND CHURCH OR RELIGIOUS SERVICES?: NEVER

## 2021-10-01 SDOH — ECONOMIC STABILITY: HOUSING INSECURITY: IN THE LAST 12 MONTHS, HOW MANY PLACES HAVE YOU LIVED?: 1

## 2021-10-01 SDOH — ECONOMIC STABILITY: FOOD INSECURITY: WITHIN THE PAST 12 MONTHS, YOU WORRIED THAT YOUR FOOD WOULD RUN OUT BEFORE YOU GOT MONEY TO BUY MORE.: NEVER TRUE

## 2021-10-01 SDOH — ECONOMIC STABILITY: TRANSPORTATION INSECURITY
IN THE PAST 12 MONTHS, HAS LACK OF TRANSPORTATION KEPT YOU FROM MEETINGS, WORK, OR FROM GETTING THINGS NEEDED FOR DAILY LIVING?: NO

## 2021-10-01 SDOH — ECONOMIC STABILITY: HOUSING INSECURITY
IN THE LAST 12 MONTHS, WAS THERE A TIME WHEN YOU DID NOT HAVE A STEADY PLACE TO SLEEP OR SLEPT IN A SHELTER (INCLUDING NOW)?: NO

## 2021-10-01 SDOH — ECONOMIC STABILITY: INCOME INSECURITY: IN THE LAST 12 MONTHS, WAS THERE A TIME WHEN YOU WERE NOT ABLE TO PAY THE MORTGAGE OR RENT ON TIME?: NO

## 2021-10-01 SDOH — SOCIAL STABILITY: SOCIAL NETWORK: HOW OFTEN DO YOU ATTENT MEETINGS OF THE CLUB OR ORGANIZATION YOU BELONG TO?: NEVER

## 2021-10-01 SDOH — ECONOMIC STABILITY: FOOD INSECURITY: WITHIN THE PAST 12 MONTHS, THE FOOD YOU BOUGHT JUST DIDN'T LAST AND YOU DIDN'T HAVE MONEY TO GET MORE.: NEVER TRUE

## 2021-10-01 SDOH — HEALTH STABILITY: PHYSICAL HEALTH: ON AVERAGE, HOW MANY MINUTES DO YOU ENGAGE IN EXERCISE AT THIS LEVEL?: 10 MIN

## 2021-10-01 SDOH — HEALTH STABILITY: MENTAL HEALTH
STRESS IS WHEN SOMEONE FEELS TENSE, NERVOUS, ANXIOUS, OR CAN'T SLEEP AT NIGHT BECAUSE THEIR MIND IS TROUBLED. HOW STRESSED ARE YOU?: NOT AT ALL

## 2021-10-01 NOTE — TELEPHONE ENCOUNTER
Pt called to let Maira Griffiths know that the Mely Morales seems to be helping a lot better and would like for her to send in an rx for that inhaler to [facility] on [address] (in system already).

## 2021-10-01 NOTE — PROGRESS NOTES
Deepika Bowden called the office stating that he was tolerating the sample of Breztri given to him from the office. Prescription has been sent to his pharmacy of record with 5 refills.

## 2021-10-01 NOTE — CARE COORDINATION
Ambulatory Care Coordination Note  10/1/2021  CM Risk Score: 2  Charlson 10 Year Mortality Risk Score: 100%     ACC: Brigid Brown RN    Summary Note:   Spoke with patient for ACM follow up. Patient reports that he is doing well. Denies recent falls. Instructed on safety/ fall prevention measures. COPD:  Breathing is at baseline. Encouraged patient to avoid triggers. Reviewed COPD zone management tool and s/s to report to MD.  Marya Getting on Rx for Breztri called in to Pharmacy as per request per Pulmonology. Patient reports that his family assists with care as needed. Patient denies questions or resource needs. ACM contact information provided should needs arise. Plan for next outreach: Address Care Gaps, ACP support        Care Coordination Interventions    Program Enrollment: Complex Care  Referral from Primary Care Provider: No  Suggested Interventions and Community Resources  Fall Risk Prevention: In Process         Goals Addressed                 This Visit's Progress     Reduce Falls    On track     I will reduce my risk of falls by the following: Remove rugs or use non slip rugs  Install grab bars in bathroom  Use walking aids like cane or walker  Follow through on orders for PT    Barriers: fear of failure and overwhelmed by complexity of regimen  Plan for overcoming my barriers: Ensure clear pathways, proper use of DME  Confidence:  8/10/21  Anticipated Goal Completion Date: 12/2/21            Prior to Admission medications    Medication Sig Start Date End Date Taking?  Authorizing Provider   Budeson-Glycopyrrol-Formoterol (BREZTRI AEROSPHERE) 160-9-4.8 MCG/ACT AERO Inhale 2 puffs into the lungs 2 times daily Use with spacer for improved medication delivery 10/1/21 10/31/21  POPEYE Lion - CNP   SYMBICORT 80-4.5 MCG/ACT AERO TAKE 2 PUFFS BY MOUTH TWICE A DAY 9/27/21   Dominick Feliciano MD   levothyroxine (SYNTHROID) 75 MCG tablet Take 1 tablet by mouth Daily 8/19/21 Michelle Andrews MD   albuterol sulfate  (90 Base) MCG/ACT inhaler Inhale 2 puffs into the lungs every 6 hours as needed for Wheezing or Shortness of Breath 7/26/21   Michelle Andrews MD   nicotine (NICODERM CQ) 21 MG/24HR Place 1 patch onto the skin daily 7/27/21   Xavi Schroeder MD   metoprolol tartrate (LOPRESSOR) 25 MG tablet Take 0.5 tablets by mouth 2 times daily 5/10/21   Michelle Andrews MD   atorvastatin (LIPITOR) 10 MG tablet Take 1 tablet by mouth daily 5/10/21   Michelle Andrews MD   Ipratropium-Albuterol (ALBUTEROL-IPRATROPIUM IN) Inhale 2 puffs into the lungs 4 times daily as needed     Historical Provider, MD   OXYGEN Inhale into the lungs nightly    Historical Provider, MD   Multiple Vitamins-Minerals (PRESERVISION AREDS PO) Take by mouth daily    Historical Provider, MD       Future Appointments   Date Time Provider Andre Oakes   11/10/2021  8:15 AM Michelle Andrews MD [address] IM [facility]   12/21/2021 10:00 AM Dima Cornejo, APRN - CNP [address] PULM [facility]   3/8/2022  9:30 AM Juhi Maher MD [address] CC [facility]     ,   COPD Assessment    Does the patient understand envrionmental exposure?: Yes  Is the patient able to verbalize Rescue vs. Long Acting medications?: Yes  Does the patient have a nebulizer?: Yes  Does the patient use a space with inhaled medications?: No            Symptoms:         and   General Assessment    Do you have any symptoms that are causing concern?: No

## 2021-10-05 ENCOUNTER — TELEPHONE (OUTPATIENT)
Dept: PULMONOLOGY | Age: 76
End: 2021-10-05

## 2021-10-05 NOTE — TELEPHONE ENCOUNTER
Called pt back who left us a message stating [facility] never got his new rx for Apliiq. I explained that on our end it looked like it had been sent. However, I printed it off and I'm faxing it directly instead of through the computer for him.

## 2021-10-27 ENCOUNTER — CARE COORDINATION (OUTPATIENT)
Dept: CARE COORDINATION | Age: 76
End: 2021-10-27

## 2021-10-27 NOTE — CARE COORDINATION
Ambulatory Care Coordination Note  10/27/2021  CM Risk Score: 2  Charlson 10 Year Mortality Risk Score: 100%     ACC: Ori Lucio RN    Summary Note:   Spoke with patient. Reports that he is feeling about the same. COPD:  Reports SOB on exertion as per baseline. Denies increased cough, wheezing or chest pain. Encouraged compliance with medications as directed. Instructed on the recommendation for routine Provider follow up. Confirmed next appt. 11/10 with PCP. Instructed patient to pace activity to avoid over exertion; avoid potential triggers. Reviewed COPD zone management tool and s./s to report to MD.    Safety/ falls:  Patient reports that he is very careful; denies falls. Reviewed fall prevention measures:  Instructed patient to ensure clear pathways, adequate lighting, avoid sudden changes in position. Discussed Care Gaps:  Patient reports that he has taken COVID booster and flu vaccine; has not had shingles. Encouraged patient to discuss any concerns with Provider. Advance Care Planning   Healthcare Decision Maker:    Primary Decision Maker: Bubba Mcrae Child - [phone]    Primary Decision Maker: Enma Puga - [phone]    Secondary Decision Maker: Marline Lyons - Child - [phone]    Today we documented Decision Maker(s) consistent with Legal Next of Kin hierarchy. Discussed ACP. Patient is agreeable to have paperwork mailed to his home. Declined support. Denies any questions or further needs at this time. AC contact information provided should needs arise. Plan for next outreach:  ACP completion, support needs    Note routed to TAMAR LOZADA [facility] with request to mail documents. Care Coordination Interventions    Program Enrollment: Complex Care  Referral from Primary Care Provider: No  Suggested Interventions and Community Resources  Fall Risk Prevention: In Process  Zone Management Tools:  In Process         Goals Addressed                 This Visit's Progress     Wellness Goal   On track     Patient Self-Management Goal for Health Maintenance  Goal: I will chose a goal related to tobacco cessation:  I will think about my triggers for smoking, I will think about reasons why I should quit smoking, I will tell someone I am trying to quit smoking, and I will avoid triggers and negative influences. I will follow a healthy diet as discussed by my provider. I will schedule a yearly preventative care visit. I will schedule screening colonoscopies as directed by my provider. I will schedule routine mammograms as directed by my provider. Barriers: overwhelmed by complexity of regimen and stress  Plan for overcoming my barriers: Patient to utilize zone management tool for COPD symptoms. Confidence:  8/10  Anticipated Goal Completion Date: 12/2/21            Prior to Admission medications    Medication Sig Start Date End Date Taking?  Authorizing Provider   Budeson-Glycopyrrol-Formoterol (BREZTRI AEROSPHERE) 160-9-4.8 MCG/ACT AERO Inhale 2 puffs into the lungs 2 times daily Use with spacer for improved medication delivery 10/1/21 10/31/21  POPEYE Ruelas - CNP   SYMBICORT 80-4.5 MCG/ACT AERO TAKE 2 PUFFS BY MOUTH TWICE A DAY 9/27/21   Trudy Hernández MD   levothyroxine (SYNTHROID) 75 MCG tablet Take 1 tablet by mouth Daily 8/19/21   Trudy Hernández MD   albuterol sulfate  (90 Base) MCG/ACT inhaler Inhale 2 puffs into the lungs every 6 hours as needed for Wheezing or Shortness of Breath 7/26/21   Trudy Hernández MD   nicotine (NICODERM CQ) 21 MG/24HR Place 1 patch onto the skin daily 7/27/21   Todd Mayfield MD   metoprolol tartrate (LOPRESSOR) 25 MG tablet Take 0.5 tablets by mouth 2 times daily 5/10/21   Trudy Hernández MD   atorvastatin (LIPITOR) 10 MG tablet Take 1 tablet by mouth daily 5/10/21   Trudy Hernández MD   Ipratropium-Albuterol (ALBUTEROL-IPRATROPIUM IN) Inhale 2 puffs into the lungs 4 times daily as needed     Historical Provider, MD   OXYGEN Inhale into the lungs nightly    Historical Provider, MD   Multiple Vitamins-Minerals (PRESERVISION AREDS PO) Take by mouth daily    Historical Provider, MD       Future Appointments   Date Time Provider Andre Oakes   11/10/2021  8:15 AM Barber Ang MD [facility] E S IM [facility]   12/21/2021 10:00 AM German Cornejo, APRN - CNP [facility] PULM [facility]   3/8/2022  9:30 AM Rupa De La Torre MD [facility] CC [facility]     ,   COPD Assessment    Does the patient understand envrionmental exposure?: Yes  Is the patient able to verbalize Rescue vs. Long Acting medications?: Yes  Does the patient have a nebulizer?: Yes  Does the patient use a space with inhaled medications?: No            Symptoms:     Symptom course: no change      and   General Assessment    Do you have any symptoms that are causing concern?: No

## 2021-10-29 ENCOUNTER — CARE COORDINATION (OUTPATIENT)
Dept: CARE COORDINATION | Age: 76
End: 2021-10-29

## 2021-11-10 ENCOUNTER — OFFICE VISIT (OUTPATIENT)
Dept: INTERNAL MEDICINE CLINIC | Age: 76
End: 2021-11-10
Payer: MEDICARE

## 2021-11-10 VITALS
WEIGHT: 117 LBS | HEART RATE: 77 BPM | DIASTOLIC BLOOD PRESSURE: 68 MMHG | RESPIRATION RATE: 16 BRPM | SYSTOLIC BLOOD PRESSURE: 138 MMHG | OXYGEN SATURATION: 96 % | HEIGHT: 67 IN | BODY MASS INDEX: 18.36 KG/M2

## 2021-11-10 DIAGNOSIS — C34.91 SCC (SQUAMOUS CELL CARCINOMA OF LUNG), RIGHT (HCC): ICD-10-CM

## 2021-11-10 DIAGNOSIS — I73.9 PVD (PERIPHERAL VASCULAR DISEASE) WITH CLAUDICATION (HCC): ICD-10-CM

## 2021-11-10 DIAGNOSIS — I10 ESSENTIAL HYPERTENSION: ICD-10-CM

## 2021-11-10 DIAGNOSIS — E78.2 HYPERLIPIDEMIA, MIXED: ICD-10-CM

## 2021-11-10 DIAGNOSIS — E03.4 HYPOTHYROIDISM DUE TO ACQUIRED ATROPHY OF THYROID: ICD-10-CM

## 2021-11-10 DIAGNOSIS — Z00.00 ROUTINE GENERAL MEDICAL EXAMINATION AT A HEALTH CARE FACILITY: Primary | ICD-10-CM

## 2021-11-10 DIAGNOSIS — J44.9 CHRONIC OBSTRUCTIVE PULMONARY DISEASE, UNSPECIFIED COPD TYPE (HCC): ICD-10-CM

## 2021-11-10 LAB
A/G RATIO: 2.3 (ref 1.1–2.2)
ALBUMIN SERPL-MCNC: 4.2 G/DL (ref 3.4–5)
ALP BLD-CCNC: 67 U/L (ref 40–129)
ALT SERPL-CCNC: 15 U/L (ref 10–40)
ANION GAP SERPL CALCULATED.3IONS-SCNC: 12 MMOL/L (ref 3–16)
AST SERPL-CCNC: 14 U/L (ref 15–37)
BASOPHILS ABSOLUTE: 0 K/UL (ref 0–0.2)
BASOPHILS RELATIVE PERCENT: 0.7 %
BILIRUB SERPL-MCNC: 0.4 MG/DL (ref 0–1)
BUN BLDV-MCNC: 17 MG/DL (ref 7–20)
CALCIUM SERPL-MCNC: 9.3 MG/DL (ref 8.3–10.6)
CHLORIDE BLD-SCNC: 101 MMOL/L (ref 99–110)
CHOLESTEROL, TOTAL: 145 MG/DL (ref 0–199)
CO2: 30 MMOL/L (ref 21–32)
CREAT SERPL-MCNC: 0.8 MG/DL (ref 0.8–1.3)
EOSINOPHILS ABSOLUTE: 0.1 K/UL (ref 0–0.6)
EOSINOPHILS RELATIVE PERCENT: 1 %
GFR AFRICAN AMERICAN: >60
GFR NON-AFRICAN AMERICAN: >60
GLUCOSE BLD-MCNC: 90 MG/DL (ref 70–99)
HCT VFR BLD CALC: 42.3 % (ref 40.5–52.5)
HDLC SERPL-MCNC: 85 MG/DL (ref 40–60)
HEMOGLOBIN: 14.2 G/DL (ref 13.5–17.5)
LDL CHOLESTEROL CALCULATED: 26 MG/DL
LYMPHOCYTES ABSOLUTE: 1 K/UL (ref 1–5.1)
LYMPHOCYTES RELATIVE PERCENT: 14.5 %
MCH RBC QN AUTO: 33.1 PG (ref 26–34)
MCHC RBC AUTO-ENTMCNC: 33.5 G/DL (ref 31–36)
MCV RBC AUTO: 98.7 FL (ref 80–100)
MONOCYTES ABSOLUTE: 0.8 K/UL (ref 0–1.3)
MONOCYTES RELATIVE PERCENT: 11 %
NEUTROPHILS ABSOLUTE: 5 K/UL (ref 1.7–7.7)
NEUTROPHILS RELATIVE PERCENT: 72.8 %
PDW BLD-RTO: 13.7 % (ref 12.4–15.4)
PLATELET # BLD: 205 K/UL (ref 135–450)
PMV BLD AUTO: 9 FL (ref 5–10.5)
POTASSIUM SERPL-SCNC: 5.1 MMOL/L (ref 3.5–5.1)
RBC # BLD: 4.28 M/UL (ref 4.2–5.9)
SODIUM BLD-SCNC: 143 MMOL/L (ref 136–145)
T4 FREE: 1.5 NG/DL (ref 0.9–1.8)
TOTAL PROTEIN: 6 G/DL (ref 6.4–8.2)
TRIGL SERPL-MCNC: 170 MG/DL (ref 0–150)
TSH SERPL DL<=0.05 MIU/L-ACNC: 1.58 UIU/ML (ref 0.27–4.2)
VLDLC SERPL CALC-MCNC: 34 MG/DL
WBC # BLD: 6.9 K/UL (ref 4–11)

## 2021-11-10 PROCEDURE — 99214 OFFICE O/P EST MOD 30 MIN: CPT | Performed by: INTERNAL MEDICINE

## 2021-11-10 PROCEDURE — G8484 FLU IMMUNIZE NO ADMIN: HCPCS | Performed by: INTERNAL MEDICINE

## 2021-11-10 PROCEDURE — 36415 COLL VENOUS BLD VENIPUNCTURE: CPT | Performed by: INTERNAL MEDICINE

## 2021-11-10 PROCEDURE — G8419 CALC BMI OUT NRM PARAM NOF/U: HCPCS | Performed by: INTERNAL MEDICINE

## 2021-11-10 PROCEDURE — 4004F PT TOBACCO SCREEN RCVD TLK: CPT | Performed by: INTERNAL MEDICINE

## 2021-11-10 PROCEDURE — G8427 DOCREV CUR MEDS BY ELIG CLIN: HCPCS | Performed by: INTERNAL MEDICINE

## 2021-11-10 PROCEDURE — 4040F PNEUMOC VAC/ADMIN/RCVD: CPT | Performed by: INTERNAL MEDICINE

## 2021-11-10 PROCEDURE — 3023F SPIROM DOC REV: CPT | Performed by: INTERNAL MEDICINE

## 2021-11-10 PROCEDURE — 1123F ACP DISCUSS/DSCN MKR DOCD: CPT | Performed by: INTERNAL MEDICINE

## 2021-11-10 PROCEDURE — 3017F COLORECTAL CA SCREEN DOC REV: CPT | Performed by: INTERNAL MEDICINE

## 2021-11-10 PROCEDURE — G8926 SPIRO NO PERF OR DOC: HCPCS | Performed by: INTERNAL MEDICINE

## 2021-11-10 PROCEDURE — G0439 PPPS, SUBSEQ VISIT: HCPCS | Performed by: INTERNAL MEDICINE

## 2021-11-10 RX ORDER — BUDESONIDE, GLYCOPYRROLATE, AND FORMOTEROL FUMARATE 160; 9; 4.8 UG/1; UG/1; UG/1
AEROSOL, METERED RESPIRATORY (INHALATION) DAILY
COMMUNITY
Start: 2021-11-03 | End: 2021-11-16 | Stop reason: SDUPTHER

## 2021-11-10 ASSESSMENT — PATIENT HEALTH QUESTIONNAIRE - PHQ9
SUM OF ALL RESPONSES TO PHQ9 QUESTIONS 1 & 2: 0
SUM OF ALL RESPONSES TO PHQ QUESTIONS 1-9: 0
SUM OF ALL RESPONSES TO PHQ QUESTIONS 1-9: 0
1. LITTLE INTEREST OR PLEASURE IN DOING THINGS: 0
SUM OF ALL RESPONSES TO PHQ QUESTIONS 1-9: 0
2. FEELING DOWN, DEPRESSED OR HOPELESS: 0

## 2021-11-10 ASSESSMENT — LIFESTYLE VARIABLES
AUDIT-C TOTAL SCORE: INCOMPLETE
AUDIT TOTAL SCORE: INCOMPLETE
HOW OFTEN DO YOU HAVE A DRINK CONTAINING ALCOHOL: 0
HOW OFTEN DO YOU HAVE A DRINK CONTAINING ALCOHOL: NEVER

## 2021-11-10 NOTE — PROGRESS NOTES
Medicare Annual Wellness Visit  Name: Chitra Castillo Date: 11/10/2021   MRN: [id number] Sex: Male   Age: 76 y.o. Ethnicity: Non- / Non    : 1945 Race: White (non-)      Charlene Gilbert is here for Medicare AWV and COPD (symbicort changed to [facility])    Screenings for behavioral, psychosocial and functional/safety risks, and cognitive dysfunction are all negative except as indicated below. These results, as well as other patient data from the [address] form, are documented in Flowsheets linked to this Encounter. COPD has been stable without any recent cough or wheezing, SOB, fevers, chills. Seeing Dr Oswald Kaur. Switched symbicort to breztri. Still smoking 10-12cig/day    Hypothyroid has been asymptomatic w/o sx of fatigue, constipation, cold intolerance, depression. Scc r LUNG, CONT F/U DR CLAYTON.  STATES F/U CT IS SCHED NEXT MO. Lipids:  Is continuing statin therapy and low fat diet. Tolerating medications w/o myalgias or GI upset. PAD- no leg pain and defers f/u Dr Carmen Thompson at this time. No Known Allergies    Prior to Visit Medications    Medication Sig Taking?  Authorizing Provider   levothyroxine (SYNTHROID) 75 MCG tablet Take 1 tablet by mouth Daily Yes Vince Milner MD   albuterol sulfate  (90 Base) MCG/ACT inhaler Inhale 2 puffs into the lungs every 6 hours as needed for Wheezing or Shortness of Breath Yes Vince Milner MD   nicotine (NICODERM CQ) 21 MG/24HR Place 1 patch onto the skin daily Yes Vlad Vega MD   metoprolol tartrate (LOPRESSOR) 25 MG tablet Take 0.5 tablets by mouth 2 times daily Yes Vince Milner MD   atorvastatin (LIPITOR) 10 MG tablet Take 1 tablet by mouth daily Yes Vince Milner MD   Ipratropium-Albuterol (ALBUTEROL-IPRATROPIUM IN) Inhale 2 puffs into the lungs 4 times daily as needed  Yes Historical Provider, MD   OXYGEN Inhale into the lungs nightly Yes Historical Provider, MD Multiple Vitamins-Minerals (PRESERVISION AREDS PO) Take by mouth daily Yes Historical Provider, MD Lyanne Curling 160-9-4.8 MCG/ACT AERO daily  Historical Provider, MD       Past Medical History:   Diagnosis Date    Atherosclerosis of aortic arch ([facility] [address]     NOTED ON CXR 1/2017- CONSIDER CARDIO CONSULT    Atypical chest pain     COPD (chronic obstructive pulmonary disease) ([facility] [address]     follow with Dr Delvin Cadet- chantix ineffective    Essential hypertension 01/29/2018    Full dentures     History of external beam radiation therapy 2009/2019    pelvis/anus in 2009 and right lower lobe lung mass SBRT in 2019 per Dr. Evelio Andrews at [address] Hyperlipidemia, mixed     Hypertension     Hypothyroidism due to acquired atrophy of thyroid 05/11/2021    ANTIPEROXIDASE ANTIBODY NEGATIVE    On home oxygen therapy     2l/nc at night and prn    PAD (peripheral artery disease) ([facility] [address] [facility]     R leg on doppler 8/2017-- referring to Dr Joel Colón Primary lung squamous cell carcinoma, right ([facility] [address] 12/2018    Dr Brayan Clancy, Dr Delvin Cadet, Dr Evelio Andrews- treated w RT,    Pulmonary nodule, left 07/2018    Rectal cancer [facility]) 2009    Dr Shagufta Persaud oncology, seen by GI at [facility]- Dr Jovany Karimi- tx with chemo and radiation     S/P colonoscopy 02/01/2017    Dr Shirin Padilla at [facility]-  diverticulosis and angioectasis, being referred to surgeon--CONSIDER RECHECK 11 YRS    SCC (squamous cell carcinoma of lung), right [facility])     Dr Brayan Clancy, periodically Dr Evelio Andrews.  had resection w Dr Maldonado Persons Subclavian artery stenosis, right [facility])    Jaja Muro Wears glasses        Past Surgical History:   Procedure Laterality Date    ANGIOPLASTY Bilateral 2017    Dr Nayla Carrillo for bilat leg PVD   Leticia Joyce Left 2018    Dr Tara Sterling  2009    Dr Hayward Fearing then needed revision at [address] [facility] one 2017   [address]  2009    LUNG BIOPSY  12/07/2018    LYMPH NODE BIOPSY  07/2009    right axilla    TONSILLECTOMY  age 11       Family History   Problem Relation Age of Onset    Cirrhosis Mother     Lung Cancer Father     Cancer Father         brain cancer       CareTeam (Including outside providers/suppliers regularly involved in providing care):   Patient Care Team:  Vince Milner MD as PCP - Refugio Bello MD as PCP - [address] [place] [address] [facility]  Debra Rendon MD as Consulting Physician (Hematology and Oncology)  Glenroy Platt MD as Consulting Physician (Pulmonary Disease)  Rin Sage RN as [facility]    Wt Readings from Last 3 Encounters:   11/10/21 117 lb (53.1 kg)   09/08/21 115 lb 3.2 oz (52.3 kg)   08/25/21 115 lb 6.4 oz (52.3 kg)     Vitals:    11/10/21 0817   BP: 138/68   Pulse: 77   Resp: 16   SpO2: 96%   Weight: 117 lb (53.1 kg)   Height: 5' 7\" (1.702 m)     Body mass index is 18.32 kg/m². Based upon direct observation of the patient, evaluation of cognition reveals recent and remote memory intact.     General Appearance: alert and oriented to person, place and time, well developed and well- nourished, in no acute distress  Skin: warm and dry, no rash or erythema  Head: normocephalic and atraumatic  Eyes: pupils equal, round, and reactive to light, extraocular eye movements intact, conjunctivae normal  Neck: supple and non-tender without mass, no thyromegaly or thyroid nodules, no cervical lymphadenopathy  Pulmonary/Chest: clear to auscultation bilaterally- no wheezes, rales or rhonchi, normal air movement, no respiratory distress  Cardiovascular: normal rate, regular rhythm, normal S1 and S2, no murmurs, rubs, clicks, or gallops, distal pulses intact, no carotid bruits  Abdomen: soft, non-tender, non-distended, normal bowel sounds, no masses or organomegaly  Extremities: no cyanosis, clubbing or edema  Musculoskeletal: normal range of motion, no joint swelling, deformity or tenderness  Neurologic: reflexes normal and symmetric, no cranial nerve deficit, gait, coordination and speech normal    Patient's complete Health Risk Assessment and screening values have been reviewed and are found in Flowsheets. The following problems were reviewed today and where indicated follow up appointments were made and/or referrals ordered. Positive Risk Factor Screenings with Interventions:         Substance History:  Social History     Tobacco History     Smoking Status  Current Every Day Smoker Smoking Frequency  1 pack/day for 60 years (60 pk yrs) Smoking Tobacco Type  Cigarettes    Smokeless Tobacco Use  Never Used          Alcohol History     Alcohol Use Status  Yes Drinks/Week  20 Cans of beer per week Amount  20.0 standard drinks of alcohol/wk Comment  average\"3 beers per day\" per pt on 12/6/2018          Drug Use     Drug Use Status  No          Sexual Activity     Sexually Active  Not Asked               Alcohol Screening:       A score of 8 or more is associated with harmful or hazardous drinking. A score of 13 or more in women, and 15 or more in men, is likely to indicate alcohol dependence. Substance Abuse Interventions:  · to work on smoking cessation    General Health and ACP:  General  In general, how would you say your health is?: Good  In the past 7 days, have you experienced any of the following?  New or Increased Pain, New or Increased Fatigue, Loneliness, Social Isolation, Stress or Anger?: None of These  Do you get the social and emotional support that you need?: Yes  Do you have a Living Will?: Yes  Advance Directives     Power of [address] [place] [address] Will ACP-Advance Directive ACP-Power of     Not on File Not on File Not on File Not on File      General Health Risk Interventions:  · no issues above, plenty of support fr dtrs    Health Habits/Nutrition:  Health Habits/Nutrition  Do you exercise for at least 20 minutes 2-3 times per week?: (!) No  Have you lost any weight without trying in the past 3 months?: No  Do you eat only one meal per day?: No  Have you

## 2021-11-10 NOTE — PATIENT INSTRUCTIONS
To help decrease smoking further, please consider SCHEDULING NICORETTE GUM OR A LOZENGE 3X/DAY- AFTER MEALS    FOR IMPROVING YOUR OVERALL CONDITIONING, PLEASE CONSIDER AN EXERCISE PEDDLER USING 2+ HRS/DAY  Personalized Preventive Plan for Gordon Capellan - 11/10/2021  Medicare offers a range of preventive health benefits. Some of the tests and screenings are paid in full while other may be subject to a deductible, co-insurance, and/or copay. Some of these benefits include a comprehensive review of your medical history including lifestyle, illnesses that may run in your family, and various assessments and screenings as appropriate. After reviewing your medical record and screening and assessments performed today your provider may have ordered immunizations, labs, imaging, and/or referrals for you. A list of these orders (if applicable) as well as your Preventive Care list are included within your After Visit Summary for your review. Other Preventive Recommendations:    · A preventive eye exam performed by an eye specialist is recommended every 1-2 years to screen for glaucoma; cataracts, macular degeneration, and other eye disorders. · A preventive dental visit is recommended every 6 months. · Try to get at least 150 minutes of exercise per week or 10,000 steps per day on a pedometer . · Order or download the FREE \"Exercise & Physical Activity: Your Everyday Guide\" from [employer]. Call 0-326.452.7929 or search [employer] online. · You need 4524-9779 mg of calcium and 9733-7125 IU of vitamin D per day. It is possible to meet your calcium requirement with diet alone, but a vitamin D supplement is usually necessary to meet this goal.  · When exposed to the sun, use a sunscreen that protects against both UVA and UVB radiation with an SPF of 30 or greater. Reapply every 2 to 3 hours or after sweating, drying off with a towel, or swimming.   · Always wear a seat belt when traveling in a car. Always wear a helmet when riding a bicycle or motorcycle.

## 2021-11-15 ENCOUNTER — CARE COORDINATION (OUTPATIENT)
Dept: CARE COORDINATION | Age: 76
End: 2021-11-15

## 2021-11-15 NOTE — CARE COORDINATION
Ambulatory Care Coordination Note  11/15/2021  CM Risk Score: 2  Charlson 10 Year Mortality Risk Score: 100%     ACC: Dereck Coyne RN    Summary Note:   Spoke with patient for ACM follow up. COPD:  Patient reports that breathing is at baseline. Reports SOB on exertion. Denies increased cough, SOB, CP or wheezing. Reports that he is using long acting/ rescue inhaler as directed. Patient uses oxygen at night. Encouraged energy conservation; compliance with medication as directed. Instructed patient to avoid potential triggers. Encouraged routine Provider follow up. Confirmed next appt. With Pulmonology 12/21. Reviewed COPD zone management tool and s/s to report to MD.    Patient reports that he is running low on Comiso Rx. Per chart notes Rx. Has been faxed to the pharmacy. Patient also   inquires as to whether he is to take Albuterol- Ipratropium. Reports that he is not using at this time. ACM to follow up to confirm. Falls/ safety:  Patient reports that he has been steady on his feet; denies recent falls. Instructed on safety/ fall prevention measures. ACP:  Patient confirms that he has received paperwork; but has not completed documents at this time. Denies the need for ACP support. Discussed care gaps. Patient denies any questions. ACM contact information provided should questions arise. Plan for next outreach in 3-4 weeks as per patient request:  Confirm ACP support, assess support needs, review zone tools    Note routed to Pulmonology for med. Rec. Care Coordination Interventions    Program Enrollment: Complex Care  Referral from Primary Care Provider: No  Suggested Interventions and Community Resources  Fall Risk Prevention: In Process  Registered Dietician: Declined  Social Work: Declined  Other Services: In Process (Comment: ACP documents)  Zone Management Tools:  In Process         Goals Addressed                 This Visit's Progress     Reduce Falls On track     I will reduce my risk of falls by the following: Remove rugs or use non slip rugs  Install grab bars in bathroom  Use walking aids like cane or walker  Follow through on orders for PT    Barriers: fear of failure and overwhelmed by complexity of regimen  Plan for overcoming my barriers: Ensure clear pathways, proper use of DME  Confidence:  8/10/21  Anticipated Goal Completion Date: 12/2/21       Wellness Goal   On track     Patient Self-Management Goal for Health Maintenance  Goal: I will chose a goal related to tobacco cessation:  I will think about my triggers for smoking, I will think about reasons why I should quit smoking, I will tell someone I am trying to quit smoking, and I will avoid triggers and negative influences. I will follow a healthy diet as discussed by my provider. I will schedule a yearly preventative care visit. I will schedule screening colonoscopies as directed by my provider. I will schedule routine mammograms as directed by my provider. Barriers: overwhelmed by complexity of regimen and stress  Plan for overcoming my barriers: Patient to utilize zone management tool for COPD symptoms. Confidence:  8/10  Anticipated Goal Completion Date: 12/2/21            Prior to Admission medications    Medication Sig Start Date End Date Taking?  Authorizing Provider   Randal Herzog 160-9-4.8 MCG/ACT AERO daily 11/3/21   Historical Provider, MD   levothyroxine (SYNTHROID) 75 MCG tablet Take 1 tablet by mouth Daily 8/19/21   Mao Garcia MD   albuterol sulfate  (90 Base) MCG/ACT inhaler Inhale 2 puffs into the lungs every 6 hours as needed for Wheezing or Shortness of Breath 7/26/21   Mao Garcia MD   nicotine (NICODERM CQ) 21 MG/24HR Place 1 patch onto the skin daily 7/27/21   Karla Hollis MD   metoprolol tartrate (LOPRESSOR) 25 MG tablet Take 0.5 tablets by mouth 2 times daily 5/10/21   Mao Garcia MD   atorvastatin (LIPITOR) 10 MG tablet Take 1 tablet by mouth daily 5/10/21   Archie Matson MD   Ipratropium-Albuterol (ALBUTEROL-IPRATROPIUM IN) Inhale 2 puffs into the lungs 4 times daily as needed     Historical Provider, MD   OXYGEN Inhale into the lungs nightly    Historical Provider, MD   Multiple Vitamins-Minerals (PRESERVISION AREDS PO) Take by mouth daily    Historical Provider, MD       Future Appointments   Date Time Provider Andre Violette   2021 10:00 AM Lennox Adler Emmert-Reed, APRN - CNP [address] [place] [facility]   3/8/2022  9:30  S Ayan Watkins MD [address] [facility]   5/10/2022  8:30 AM Archie Matson MD  [address] [place] [facility]     ,   COPD Assessment    Does the patient understand envrionmental exposure?: Yes  Is the patient able to verbalize Rescue vs. Long Acting medications?: Yes  Does the patient have a nebulizer?: Yes  Does the patient use a space with inhaled medications?: No            Symptoms:     Symptom course: no change  Change in chronic cough?: No/At Baseline  Change in sputum?: No/At Baseline  Self Monitoring - SaO2: Yes  Baseline SaO2 Readin  Have you had a recent diagnosis of pneumonia either by PCP or at a hospital?: No      and   General Assessment    Do you have any symptoms that are causing concern?: No

## 2021-11-16 DIAGNOSIS — J44.9 CHRONIC OBSTRUCTIVE PULMONARY DISEASE, UNSPECIFIED COPD TYPE (HCC): Primary | ICD-10-CM

## 2021-11-16 RX ORDER — BUDESONIDE, GLYCOPYRROLATE, AND FORMOTEROL FUMARATE 160; 9; 4.8 UG/1; UG/1; UG/1
2 AEROSOL, METERED RESPIRATORY (INHALATION) DAILY
Qty: 1 EACH | Refills: 5 | Status: SHIPPED | OUTPATIENT
Start: 2021-11-16 | End: 2022-05-10 | Stop reason: SDUPTHER

## 2021-11-16 NOTE — TELEPHONE ENCOUNTER
Spoke with Padmini Weston after receiving message from his primary care provider over concerns with use of albuterol rescue inhaler, Breztri and Atrovent. Leisajaqueline Trista states he is using his Zenaida Gist twice a day with spacer, his albuterol rescue inhaler 2 and on rare occasions 3 times a day. He states he is not using Atrovent at this time at all. He is requesting that we send a refill for his Zenaida Gist to his pharmacy.

## 2021-11-16 NOTE — PROGRESS NOTES
Spoke with Tavo Oh concerning his inhalers. He states he is using his Breztri with a spacer 2 puffs twice a day. He is also continuing to use his albuterol rescue inhaler 2 times a day on rare occasion 3 times a day. He is not using albuterol ipratropium at all.     Clint Goodpasture

## 2021-12-16 ENCOUNTER — CARE COORDINATION (OUTPATIENT)
Dept: CARE COORDINATION | Age: 76
End: 2021-12-16

## 2021-12-16 ENCOUNTER — VIRTUAL VISIT (OUTPATIENT)
Dept: PULMONOLOGY | Age: 76
End: 2021-12-16
Payer: MEDICARE

## 2021-12-16 DIAGNOSIS — J42 CHRONIC BRONCHITIS, UNSPECIFIED CHRONIC BRONCHITIS TYPE (HCC): ICD-10-CM

## 2021-12-16 DIAGNOSIS — J44.9 CHRONIC OBSTRUCTIVE PULMONARY DISEASE, UNSPECIFIED COPD TYPE (HCC): Primary | ICD-10-CM

## 2021-12-16 DIAGNOSIS — Z00.00 HEALTHCARE MAINTENANCE: ICD-10-CM

## 2021-12-16 DIAGNOSIS — J96.11 CHRONIC RESPIRATORY FAILURE WITH HYPOXIA (HCC): ICD-10-CM

## 2021-12-16 DIAGNOSIS — C34.91 SCC (SQUAMOUS CELL CARCINOMA OF LUNG), RIGHT (HCC): ICD-10-CM

## 2021-12-16 PROCEDURE — 99214 OFFICE O/P EST MOD 30 MIN: CPT | Performed by: NURSE PRACTITIONER

## 2021-12-16 PROCEDURE — 1123F ACP DISCUSS/DSCN MKR DOCD: CPT | Performed by: NURSE PRACTITIONER

## 2021-12-16 PROCEDURE — 3017F COLORECTAL CA SCREEN DOC REV: CPT | Performed by: NURSE PRACTITIONER

## 2021-12-16 PROCEDURE — 4040F PNEUMOC VAC/ADMIN/RCVD: CPT | Performed by: NURSE PRACTITIONER

## 2021-12-16 PROCEDURE — G8428 CUR MEDS NOT DOCUMENT: HCPCS | Performed by: NURSE PRACTITIONER

## 2021-12-16 RX ORDER — ALBUTEROL SULFATE 90 UG/1
2 AEROSOL, METERED RESPIRATORY (INHALATION) EVERY 6 HOURS PRN
Qty: 1 EACH | Refills: 5 | Status: SHIPPED | OUTPATIENT
Start: 2021-12-16 | End: 2022-05-10 | Stop reason: SDUPTHER

## 2021-12-17 NOTE — PROGRESS NOTES
[address] Pulmonary   Virtual Visit Note      Chastity Okeefe is a 76 y.o. male evaluated via my chart video on 12/16/2021. Video via doxy. Consent:  Pursuant to emergency declaration under the [address] in the [employer], 1135 waiver authorization in the [facility] Act, this virtual visit was completed with patient's consent, to reduce the patient's risk of exposure to COVID-19 and provide necessary medical care. He and/or health care decision maker is aware that that he may receive a bill for this telephone service, depending on his insurance coverage, and has provided verbal consent to proceed. Patient is at his home and Provider is currently in Pulmonary Clinic at [facility] [address] Documentation:    Harjinder Selina states he feels overall he is doing very well. He continues to follow with Dr. Iris Bonilla for small cell lung cancer and had a CT scan completed on 9/7/2021 of which he was told there has been no increase size in 5 mm nodule that they are monitoring. He states he will have a repeat CT scan in 3 months. Harjinder Marks states he has not required hospitalization, visits to the emergency room, walk-in clinic, urgent care for respiratory related illness since last visit 9/23/2021. He does state that he does continue to have some shortness of breath with exertional activity. He states he will use his albuterol rescue inhaler and has good relief at that time. He is currently on Breztri which he is using twice a day with spacer, he does have his Combivent which he uses 4 times daily as needed. He states that he has rare use of his Combivent but will use his albuterol rescue inhaler when he has shortness of breath with activity. He did complete a pulmonary function test on 9/24/2021 which demonstrated severe obstructive disease, restrictive component, severe diffusion defect. We will repeat his pulmonary function test 9/2022.     He remains on 2 L supplemental oxygen. He is monitoring his O2 sats at home. He states at rest his O2 sats are above 90% and sometimes he will remove his oxygen while he is sitting watching TV. He does check his O2 sats during that time. He does wear his O2 to with all activity and at nighttime. Yael Perez has received 3 doses of his COVID-19 immunization and is up-to-date on his flu immunization. Yael Perez does offer some concerns that this will be his first holiday without his wife of many years. His wife  3/2021 from a severe MI. He states he does have family and friends that are around to support him and help him but he still feeling this loss especially with this first Kathryn. He states laughingly \"but I am currently beating her at jeopardy\".     Past Medical History:   Diagnosis Date    Atherosclerosis of aortic arch ([facility] [address]     NOTED ON CXR 2017- CONSIDER CARDIO CONSULT    COPD (chronic obstructive pulmonary disease) ([facility] [address]     follow with Dr Maximiliano Lane- chantix ineffective    Essential hypertension 2018    Full dentures     History of external beam radiation therapy     pelvis/anus in  and right lower lobe lung mass SBRT in 2019 per Dr. Jacqueline Rivas at [facility] Hyperlipidemia, mixed     Hypertension     Hypothyroidism due to acquired atrophy of thyroid 2021    ANTIPEROXIDASE ANTIBODY NEGATIVE    On home oxygen therapy     2l/nc at night and prn    PAD (peripheral artery disease) ([facility] [address]     R leg on doppler 2017-- referring to Dr Seble Jones Primary lung squamous cell carcinoma, right ([facility] [address] 2018    Dr Melinda Edmonds, Dr Maximiliano Lane, Dr Jacqueline Rivas- treated w RT,    Pulmonary nodule, left 2018    Rectal cancer [facility])     Dr Estuardo Carlin oncology, seen by GI at [facility]- Dr Yamile Negrete- tx with chemo and radiation     S/P colonoscopy 2017    Dr Kailey Harrison at [facility]-  diverticulosis and angioectasis, being referred to surgeon--CONSIDER RECHECK 5 YRS    SCC (squamous cell carcinoma of lung), right [facility])     Dr Mary Grace To, periodically Dr Master Bustos.  had resection w Dr Mari Guzman Subclavian artery stenosis, right ([address]     Wears glasses          Current Outpatient Medications:     BREZTRI AEROSPHERE 160-9-4.8 MCG/ACT AERO, Inhale 2 puffs into the lungs daily daily, Disp: 1 each, Rfl: 5    levothyroxine (SYNTHROID) 75 MCG tablet, Take 1 tablet by mouth Daily, Disp: 90 tablet, Rfl: 1    albuterol sulfate  (90 Base) MCG/ACT inhaler, Inhale 2 puffs into the lungs every 6 hours as needed for Wheezing or Shortness of Breath, Disp: 6.7 Inhaler, Rfl: 2    nicotine (NICODERM CQ) 21 MG/24HR, Place 1 patch onto the skin daily, Disp: 30 patch, Rfl: 3    metoprolol tartrate (LOPRESSOR) 25 MG tablet, Take 0.5 tablets by mouth 2 times daily, Disp: 90 tablet, Rfl: 1    atorvastatin (LIPITOR) 10 MG tablet, Take 1 tablet by mouth daily, Disp: 90 tablet, Rfl: 1    Ipratropium-Albuterol (ALBUTEROL-IPRATROPIUM IN), Inhale 2 puffs into the lungs 4 times daily as needed , Disp: , Rfl:     OXYGEN, Inhale into the lungs nightly, Disp: , Rfl:     Multiple Vitamins-Minerals (PRESERVISION AREDS PO), Take by mouth daily, Disp: , Rfl:     ROS:   Constitutional: Denies fever, denies chills   Cardiovascular: Denies chest pain, denies palpitations. Pulmonary: Positive cough, positive SOBOE, denies wheeze  Musculoskeletal:  Denies joint swelling, denies joint pain   Psychiatric/Behavioral: Negative for dysphoric mood    Physical exam via video exam per My chart/Doxy:    Gen: No distress. Normal appearance  Eyes:  No sclera icterus. No conjunctival injection. ENT:  External appearance of ears and nose normal.  Resp: No respiratory distress noted, no conversational dyspnea, no cough present during Video exam. O2 sat per patient home monitor 92%  Skin: Exposed areas appear in tact  M/S: No cyanosis. No clubbing. Neuro: no focal deficit, mental status at baseline  Psych: Oriented x 3.  Appropriate mood and behavior. Intact judgement and insight. PFT testin2021  FVC 49% predicted/58% predicted, FEV1 21% predicted/28% predicted, FEF 25-75 12% predicted/17% predicted, % predicted, RV to 24% predicted, DLCO 26% predicted  Significant response to bronchodilator is noted with increase in both FVC and FEV1  Overall testing demonstrates severe obstructive disease with significant response to bronchodilator therapy and severe diffusion defect    Radiology:  2021 chest CT with contrast  Similar posttreatment changes in the right lower lobe without evidence of disease. Unchanged 8 mm groundglass nodule in right upper lobe and increased in size now 1.2 cm groundglass nodule in left upper lobe. Similar mild dilation of the main pancreatic duct, partially imaged    Diagnosis:    ICD-10-CM    1. Chronic obstructive pulmonary disease, unspecified COPD type ([facility] [address]  J44.9    2. Chronic respiratory failure with hypoxia (HCC)  J96.11    3. SCC (squamous cell carcinoma of lung), right (HCC)  C34.91    4.  Healthcare maintenance  Z00.00           Plan:  --Continue Breztri, denies need for refill  --Continue albuterol rescue inhaler as needed, denies need for refill  --Continue Combivent every 4 hours as needed, denies need for refill  --Repeat PFT 2022  --Repeat CT chest, already ordered by Dr. Ortiz Maldonado  --Continue follow-up with Dr. Ortiz Maldonado  --While he has been vaccinated at this time for COVID-19 I strongly recommend that he continue Coronavirus precaution including: Wearing mask when in public and when in contact with persons who have not been immunized, social distancing when needing to be in public, handwashing practice, wiping items touched in public such as gas pumps, door handles, shopping carts, etc.  --Recommend yearly flu vaccination  --Recommend Covid 19 booster when available  --Recommend Pneumovax vaccination  --Have discussed signs and symptoms of COPD exacerbation and why it is important to monitor for bronchial infections. To call office should he develop signs of COPD exacerbation/bronchial infection  --I will continue to follow in pulmonary clinic, 3/22/2022 9 AM      I affirm this is a Patient initiated episode with an established patient who has not had a related appointment within my department in the past 7 days or scheduled within the next 24 hours. I have spent 23 minutes reviewing previous notes, test results and communicating with patient discussing the diagnosis and importance of treatment plan.     Note: not billable if this call serves to triage the patient into an appointment for the relevant concern    Roosevelt Callahan, POPEYE - CNP

## 2021-12-30 ENCOUNTER — CARE COORDINATION (OUTPATIENT)
Dept: CARE COORDINATION | Age: 76
End: 2021-12-30

## 2021-12-30 NOTE — CARE COORDINATION
Ambulatory Care Coordination Note  12/30/2021  CM Risk Score: 2  Charlson 10 Year Mortality Risk Score: 100%     ACC: Mariela Pal RN    Summary Note:    Spoke with patient for ACM follow up. Patient reports that he is feeling well. COPD:  Encouraged compliance with medications. Instructed patient to avoid cigarette smoke; strong chemicals. Patient is using long acting/ rescue inhaler as directed. Denies worsening symptoms. Reviewed COPD zone management tool and s/s to report to MD.    Patient reports that he is using his oxygen at night. Reports that he takes a nap during the day and uses it at that time as well. Encouraged patient to pace activity to avoid overexertion. Discussed safety/ fall prevention. Reviewed Care gaps. Further assessment deferred as patient reports that his children are visiting. ACM contact information provided should needs arise. Plan for next outreach; Re-address ACP support. Care Coordination Interventions    Program Enrollment: Complex Care  Referral from Primary Care Provider: No  Suggested Interventions and Community Resources  Fall Risk Prevention: In Process  Registered Dietician: Declined  Social Work: Declined  Other Services: In Process (Comment: ACP documents)  Zone Management Tools:  In Process         Goals Addressed                 This Visit's Progress     Reduce Falls    On track     I will reduce my risk of falls by the following: Remove rugs or use non slip rugs  Install grab bars in bathroom  Use walking aids like cane or walker  Follow through on orders for PT    Barriers: fear of failure and overwhelmed by complexity of regimen  Plan for overcoming my barriers: Ensure clear pathways, proper use of DME  Confidence:  8/10/21  Anticipated Goal Completion Date: 12/2/21       Wellness Goal   On track     Patient Self-Management Goal for Health Maintenance  Goal: I will chose a goal related to tobacco cessation:  I will think about my triggers for smoking, I will think about reasons why I should quit smoking, I will tell someone I am trying to quit smoking, and I will avoid triggers and negative influences. I will follow a healthy diet as discussed by my provider. I will schedule a yearly preventative care visit. I will schedule screening colonoscopies as directed by my provider. I will schedule routine mammograms as directed by my provider. Barriers: overwhelmed by complexity of regimen and stress  Plan for overcoming my barriers: Patient to utilize zone management tool for COPD symptoms. Confidence:  8/10  Anticipated Goal Completion Date: 12/2/21            Prior to Admission medications    Medication Sig Start Date End Date Taking?  Authorizing Provider   albuterol sulfate  (90 Base) MCG/ACT inhaler Inhale 2 puffs into the lungs every 6 hours as needed for Wheezing or Shortness of Breath 12/16/21   POPEYE Shannon - [place]   BREZTRI AEROSPHERE 160-9-4.8 MCG/ACT AERO Inhale 2 puffs into the lungs daily daily 11/16/21   POPEYE Shannon CNP   levothyroxine (SYNTHROID) 75 MCG tablet Take 1 tablet by mouth Daily 8/19/21   Cindra Romberg, MD   nicotine (NICODERM CQ) 21 MG/24HR Place 1 patch onto the skin daily 7/27/21   Tong Patiño MD   metoprolol tartrate (LOPRESSOR) 25 MG tablet Take 0.5 tablets by mouth 2 times daily 5/10/21   Cindra Romberg, MD   atorvastatin (LIPITOR) 10 MG tablet Take 1 tablet by mouth daily 5/10/21   Cindra Romberg, MD   Ipratropium-Albuterol (ALBUTEROL-IPRATROPIUM IN) Inhale 2 puffs into the lungs 4 times daily as needed     Historical Provider, MD   OXYGEN Inhale into the lungs nightly    Historical Provider, MD   Multiple Vitamins-Minerals (PRESERVISION AREDS PO) Take by mouth daily    Historical Provider, MD       Future Appointments   Date Time Provider Andre Oakes   3/4/2022  9:30 AM [facility] CT ROOM 1 [facility]   3/8/2022  9:30 AM Lars Rivas MD [facility]   5/10/2022  8:30 AM Archie Matson MD [facility] IM [facility]     ,   COPD Assessment    Does the patient understand envrionmental exposure?: Yes  Is the patient able to verbalize Rescue vs. Long Acting medications?: Yes  Does the patient have a nebulizer?: Yes  Does the patient use a space with inhaled medications?: No            Symptoms:     Symptom course: no change  Change in chronic cough?: No/At Baseline  Change in sputum?: No/At Baseline  Self Monitoring - SaO2: No      and   General Assessment    Do you have any symptoms that are causing concern?: No

## 2022-01-06 ENCOUNTER — TELEPHONE (OUTPATIENT)
Dept: ONCOLOGY | Age: 77
End: 2022-01-06

## 2022-01-06 NOTE — TELEPHONE ENCOUNTER
Called patient regarding his CT scan on 03.04.2022 at [facility] arr 0900. Went over prep and he stated understanding.

## 2022-02-02 ENCOUNTER — CARE COORDINATION (OUTPATIENT)
Dept: CARE COORDINATION | Age: 77
End: 2022-02-02

## 2022-02-09 DIAGNOSIS — I10 ESSENTIAL HYPERTENSION: ICD-10-CM

## 2022-02-09 DIAGNOSIS — E78.2 HYPERLIPIDEMIA, MIXED: ICD-10-CM

## 2022-02-09 DIAGNOSIS — I70.0 ATHEROSCLEROSIS OF AORTIC ARCH (HCC): ICD-10-CM

## 2022-02-09 RX ORDER — ATORVASTATIN CALCIUM 10 MG/1
10 TABLET, FILM COATED ORAL DAILY
Qty: 90 TABLET | Refills: 1 | Status: SHIPPED | OUTPATIENT
Start: 2022-02-09 | End: 2022-05-10 | Stop reason: SDUPTHER

## 2022-02-10 ENCOUNTER — CARE COORDINATION (OUTPATIENT)
Dept: CARE COORDINATION | Age: 77
End: 2022-02-10

## 2022-02-10 NOTE — CARE COORDINATION
healthy diet as discussed by my provider. I will schedule a yearly preventative care visit. I will schedule screening colonoscopies as directed by my provider. I will schedule routine mammograms as directed by my provider. Barriers: overwhelmed by complexity of regimen and stress  Plan for overcoming my barriers: Patient to utilize zone management tool for COPD symptoms. Confidence:  8/10  Anticipated Goal Completion Date: 12/2/21            Prior to Admission medications    Medication Sig Start Date End Date Taking?  Authorizing Provider   levothyroxine (SYNTHROID) 75 MCG tablet TAKE 1 TABLET BY MOUTH EVERY DAY 2/9/22   Aliyah Alba MD   metoprolol tartrate (LOPRESSOR) 25 MG tablet Take 0.5 tablets by mouth 2 times daily 2/9/22   Aliyah Alba MD   atorvastatin (LIPITOR) 10 MG tablet Take 1 tablet by mouth daily 2/9/22   Aliyah Alba MD   albuterol sulfate  (90 Base) MCG/ACT inhaler Inhale 2 puffs into the lungs every 6 hours as needed for Wheezing or Shortness of Breath 12/16/21   POPEYE Miller - [place]   BREZTRI AEROSPHERE 160-9-4.8 MCG/ACT AERO Inhale 2 puffs into the lungs daily daily 11/16/21   POPEYE Miller CNP   nicotine (NICODERM CQ) 21 MG/24HR Place 1 patch onto the skin daily 7/27/21   Ambika Wyman MD   Ipratropium-Albuterol (ALBUTEROL-IPRATROPIUM IN) Inhale 2 puffs into the lungs 4 times daily as needed     Historical Provider, MD   OXYGEN Inhale into the lungs nightly    Historical Provider, MD   Multiple Vitamins-Minerals (PRESERVISION AREDS PO) Take by mouth daily    Historical Provider, MD       Future Appointments   Date Time Provider Andre Oakes   3/4/2022  9:30 AM [facility] CT ROOM 1 [facility]   3/8/2022  9:30  S Ayan Watkins MD [address] [facility]   5/10/2022  8:30 AM Aliyah Alba MD [address] [facility]     ,   COPD Assessment    Does the patient understand envrionmental exposure?: Yes  Is the patient able to verbalize Rescue

## 2022-02-22 ENCOUNTER — TELEPHONE (OUTPATIENT)
Dept: PULMONOLOGY | Age: 77
End: 2022-02-22

## 2022-02-22 NOTE — TELEPHONE ENCOUNTER
Spoke with pt regarding order received from [address] [place] [address].  He states to discard that RX bc he has bought one that is more portable for him and easier to maneuver

## 2022-03-04 ENCOUNTER — HOSPITAL ENCOUNTER (OUTPATIENT)
Dept: CT IMAGING | Age: 77
Discharge: HOME | End: 2022-03-04
Payer: MEDICARE

## 2022-03-04 DIAGNOSIS — C34.91 SQUAMOUS CELL LUNG CANCER, RIGHT (HCC): ICD-10-CM

## 2022-03-04 PROCEDURE — 6360000004 HC RX CONTRAST MEDICATION: Performed by: INTERNAL MEDICINE

## 2022-03-04 PROCEDURE — 71260 CT THORAX DX C+: CPT

## 2022-03-04 RX ORDER — SODIUM CHLORIDE 0.9 % (FLUSH) 0.9 %
5-40 SYRINGE (ML) INJECTION PRN
Status: DISCONTINUED | OUTPATIENT
Start: 2022-03-04 | End: 2022-03-05 | Stop reason: HOSPADM

## 2022-03-04 RX ADMIN — IOPAMIDOL 75 ML: 755 INJECTION, SOLUTION INTRAVENOUS at 09:30

## 2022-03-06 NOTE — PROGRESS NOTES
Patient Name: Alvarez Nathan  Patient : 1945  Patient MRN: [id number]     Primary Oncologist: Hannah Hilario MD  Referring Provider: Aram Pope MD     Date of Service: 3/8/2022      Chief Complaint:   Chief Complaint   Patient presents with    Follow-up     Patient Active Problem List:     Malignant neoplasm of lower lobe of right lung      SCC (squamous cell carcinoma of lung), right     HPI:    [facility] is a 78-year-old very pleasant gentleman with medical history significant for hypertension, COPD, hyperlipidemia, peripheral arterial disease, coronary artery disease, history of anal canal squamous cell carcinoma, status post chemoradiation therapy (completed in 2009), initially referred to me on 2018 for evaluation of clinical stage IA1 right lower lobe squamous cell lung cancer. He stated that he has screening low dose CT scan of the chest (ordered by Dr. Gil Perry) on 2018 and it showed 10 mm ground glass left lower lobe nodule, which is new since . Subsequently he had PET/CT scan on 18 and it showed metabolically active 5 mm nodule in the right lower lobe, potentially malignant. There was no FDG activity associated with the ground glass left lower lobe nodule seen on prior CT scan. Repeat CT scan on 18 showed increasing irregular right lower lobe nodule 7.9 x 7.2 mm, considered neoplastic until proven otherwise. Resolution of left lower lobe ground glass density. He subsequently underwent CT guided biopsy of right lower lobe lung mass on 18 and final pathology was consistent with squamous cell carcinoma. He was subsequently referred to me for evaluation. PET/CT scan done on 2018 showed slightly increased size and increased uptake of the biopsy-proven malignancy in the right lower lobe. No findings of metastatic disease.     MRI of the brain with and without contrast done on 1/3/19 showed No acute intracranial abnormality. No intracranial metastatic disease. 2. Age-related parenchymal volume loss and mild chronic microvascular ischemic changes. He was seen by cardio thoracic surgeon and they don't think he is a candidate for surgery. He was subsequently seen by Dr. Nir Lopez and he was treated with stereotactic body radiation therapy between February 19 and February 26, 2019. CT chest done on March 4, 2022 showed slightly increasing consolidation within the superior segment of the right lower lobe with adjacent trace pleural effusion and volume loss likely evolving posttreatment changes/fibrosis. Slight interval decrease in size of a 6 mm groundglass nodule in the anterior inferior right upper lobe with interval resolution of nodule in the left upper lobe. On March 8, 2022, he presented to me for follow-up. I have been following him for Stage IA1 right lower lobe squamous cell lung cancer and he is status post stereotactic body radiation therapy. He has been under close observation since then. On today's visit, I reviewed with him findings of CT scan of the chest done on 3/4/2022 and we looked at his CT scans together. I recognize that he has slight increased consolidation in superior segment of RLL, most likely treatment related changes. I recommend him to have repeat CT chest in 6 months. I will set up for CT chest in 6 months and I will see him again after that. Further management will be based on findings on CT scan. Hypertension - his BP is normal. Recommend to continue with metoprolol. Hypothyroidism - stable and recommend to continue with levothyroxine 75 mcg daily. COPD - still smoking. Encourage him to quit smoking. To continue with symbicort and bronchodilator nebulizer. Hyperlipidemia - stable on lipitor 10 mg daily. Health maintenance - I recommend him to have age-appropriate cancer screening, exercise, low-fat and low-sodium diet.     He does not have any significant symptoms at today's visit. Past Medical History  Significant for  1. Hypertension  2. Hyperlipidemia  3. COPD  4. Peripheral arterial disease  5. Coronary artery disease  6. History of anal canal squamous cell carcinoma, status post concurrent chemoradiation therapy (on remission)    Surgical History  Significant for   1. Hemorrhoid surgery  2. Chemoport placement  3. Cataract surgery  4. Tonsillectomy  5. Colectomy in 2009 by Dr. Soledad Stephens  6. Angioplasty of bilateral legs by Dr. Sindhu Candelaria    Allergies  No known medication allergies    Social History  He is a current smoker and he smokes approximately 1 pack/day for last 60 years. He drinks alcohol daily and denies illicit drug abuse. He is currently living in [facility]. Family History  Significant for pancreatic cancer in 1 of his brother. No other pertinent family history. Oncology History   Malignant neoplasm of lower lobe of right lung ([facility] [address]   1/8/2019 Initial Diagnosis    Malignant neoplasm of lower lobe of right lung ([facility] [address]     2/19/2019 - 2/26/2019 Radiation    Right lower lobe: 54 trey in 3 fractions SBRT       Review of Systems: \"Per interval history; otherwise 10 point ROS is negative. \"  His energy level is good and his sleep is fine. He doesn't have fever, chills, night sweats, cough, shortness of breath, chest pain, hemoptysis or palpitations. His bowel and bladder functions are normal. He denies nausea, vomiting, abdominal pain, diarrhea, constipation, dysuria, loss of appetite or weight loss. He doesn't have neuropathy and he denies bleeding or clotting issues. He doesn't have any pain in his body. Denies anxiety or depression. The rest of the systems are unremarkable.     Vital Signs:  /80 (Site: Right Upper Arm, Position: Sitting, Cuff Size: Medium Adult)   Pulse 92   Temp 97.6 °F (36.4 °C) (Infrared)   Ht 5' 7\" (1.702 m)   Wt 120 lb (54.4 kg)   SpO2 97%   BMI 18.79 kg/m²     Physical Exam:  CONSTITUTIONAL: awake, no apparent distress, alert, cooperative,   EYES: pupils equal, round and reactive to light, sclera clear and conjunctiva normal  ENT: Normocephalic, without obvious abnormality, atraumatic  NECK: supple, symmetrical, no jugular venous distension and no carotid bruits   HEMATOLOGIC/LYMPHATIC: no cervical, supraclavicular or axillary lymphadenopathy   LUNGS: VBS, no wheezes, clear to auscultation, no rhonchi, no increased work of breathing, no crackles,    CARDIOVASCULAR: regular rate and rhythm, normal S1 and S2, no murmur noted  ABDOMEN: soft, non-distended, normal bowel sounds x 4, non-tender, no masses palpated, no hepatosplenomegaly   MUSCULOSKELETAL: full range of motion noted, tone is normal  NEUROLOGIC: awake, alert, oriented to name, place and time. Motor skills grossly intact. SKIN: Normal skin color, texture, turgor and no jaundice.  appears intact   EXTREMITIES: no clubbing, no cyanosis, no leg swelling, no LE edema,      Labs:  Hematology:  Lab Results   Component Value Date    WBC 6.9 11/10/2021    RBC 4.28 11/10/2021    HGB 14.2 11/10/2021    HCT 42.3 11/10/2021    MCV 98.7 11/10/2021    MCH 33.1 11/10/2021    MCHC 33.5 11/10/2021    RDW 13.7 11/10/2021     11/10/2021    MPV 9.0 11/10/2021    BANDSPCT 5 02/27/2012    SEGSPCT 86.7 (H) 07/22/2021    EOSRELPCT 1.0 11/10/2021    BASOPCT 0.7 11/10/2021    LYMPHOPCT 14.5 11/10/2021    MONOPCT 11.0 11/10/2021    BANDABS 0.32 02/27/2012    SEGSABS 10.3 07/22/2021    EOSABS 0.1 11/10/2021    BASOSABS 0.0 11/10/2021    LYMPHSABS 1.0 11/10/2021    MONOSABS 0.8 11/10/2021    DIFFTYPE AUTOMATED DIFFERENTIAL 07/22/2021    PLTM FEW LARGE PLATELET FORMS SEEN 98/00/9568     No results found for: ESR  Chemistry:  Lab Results   Component Value Date     11/10/2021    K 5.1 11/10/2021     11/10/2021    CO2 30 11/10/2021    BUN 17 11/10/2021    CREATININE 0.8 (L) 03/04/2022    GLUCOSE 90 11/10/2021    CALCIUM 9.3 11/10/2021    PROT 6.0 (L) 11/10/2021    LABALBU 4.2 11/10/2021    BILITOT 0.4 11/10/2021    ALKPHOS 67 11/10/2021    AST 14 (L) 11/10/2021    ALT 15 11/10/2021    LABGLOM >60 03/04/2022    GFRAA >60 03/04/2022    AGRATIO 2.3 (H) 11/10/2021    GLOB 1.9 05/10/2021    POCGLU 171 (H) 07/22/2021     No results found for: MMA, LDH, HOMOCYSTEINE  No components found for: LD  Lab Results   Component Value Date    TSHHS 4.200 12/01/2020    T4FREE 1.5 11/10/2021     Immunology:  Lab Results   Component Value Date    PROT 6.0 (L) 11/10/2021     No results found for: Willistine Dessert, KLFLCR  No results found for: B2M  Coagulation Panel:  Lab Results   Component Value Date    PROTIME 11.2 (L) 07/22/2021    INR 0.87 07/22/2021    APTT 24.9 12/07/2018     Anemia Panel:  No results found for: Aura Hensen, FOLATE  Tumor Markers:  No results found for: , CEA, , LABCA2, PSA  Observations:  No data recorded     Assessment & Plan:   Stage IA1 right lower lobe squamous cell lung cancer    PLAN  Mr. Sukhwinder Cobb is a 67year old very pleasant gentleman who was found to have 1 cm ground glass nodule in his left lower lobe on screening low dose CT scan, done on July 20, 2018. Subsequently, he had PET/CT scan and it showed metabolically active 5 mm nodule in right lower lobe, potentially malignant. No metabolic activity in left lower lobe ground glass nodule. Repeat CT scan of the chest on November 14, 2018 showed increased in size of right lower lobe lung mass, considered neoplastic until proven otherwise. Subsequently had CT guided biopsy of the right lower lobe lung mass on Decmeber 7, 2018 and final pathology was consistent with squamous cell carcinoma. PET/CT scan done on December 27, 2018 showed slightly increased size and increased uptake of the biopsy-proven malignancy in the right lower lobe. No findings of metastatic disease. MRI of the brain with and without contrast done on 1/3/19 showed No acute intracranial abnormality.  No intracranial metastatic disease. 2. Age-related parenchymal volume loss and mild chronic microvascular ischemic changes. He was seen by cardio thoracic surgeon and they don't think he is a candidate for surgery. He was subsequently seen by Dr. Ramesh Alba and he was treated with stereotactic body radiation therapy between February 19 and February 26, 2019. He has been in a close observation since then. CT chest with contrast done on September 7, 2021 showed similar posttreatment change in the right lower lobe without evidence of disease. Unchanged 8 mm groundglass nodule of the right upper lobe and increased in size now 1.2 cm groundglass nodule of the left upper lobe. CT chest done on March 4, 2022 showed slightly increasing consolidation within the superior segment of the right lower lobe with adjacent trace pleural effusion and volume loss likely evolving posttreatment changes/fibrosis. Slight interval decrease in size of a 6 mm groundglass nodule in the anterior inferior right upper lobe with interval resolution of nodule in the left upper lobe. On March 8, 2022, he presented to me for follow-up. I have been following him for Stage IA1 right lower lobe squamous cell lung cancer and he is status post stereotactic body radiation therapy. He has been under close observation since then. On today's visit, I reviewed with him findings of CT scan of the chest done on 3/4/2022 and we looked at his CT scans together. I recognize that he has slight increased consolidation in superior segment of RLL, most likely treatment related changes. I recommend him to have repeat CT chest in 6 months. I will set up for CT chest in 6 months and I will see him again after that. Further management will be based on findings on CT scan. Hypertension - his BP is normal. Recommend to continue with metoprolol. Hypothyroidism - stable and recommend to continue with levothyroxine 75 mcg daily.      COPD - still smoking. Encourage him to quit smoking. To continue with symbicort and bronchodilator nebulizer. Hyperlipidemia - stable on lipitor 10 mg daily. Health maintenance - I recommend him to have age-appropriate cancer screening, exercise, low-fat and low-sodium diet. I answered all his questions and concerns for today. I asked him to follow-up with primary care physician, Dr. Erika Sepulveda, and Dr. Angel Neumann on regular basis. Recent imaging and labs were reviewed and discussed with the patient.

## 2022-03-08 ENCOUNTER — OFFICE VISIT (OUTPATIENT)
Dept: ONCOLOGY | Age: 77
End: 2022-03-08
Payer: MEDICARE

## 2022-03-08 ENCOUNTER — HOSPITAL ENCOUNTER (OUTPATIENT)
Dept: INFUSION THERAPY | Age: 77
Discharge: HOME | End: 2022-03-08

## 2022-03-08 VITALS
WEIGHT: 120 LBS | DIASTOLIC BLOOD PRESSURE: 80 MMHG | OXYGEN SATURATION: 97 % | HEIGHT: 67 IN | TEMPERATURE: 97.6 F | BODY MASS INDEX: 18.83 KG/M2 | HEART RATE: 92 BPM | SYSTOLIC BLOOD PRESSURE: 112 MMHG

## 2022-03-08 DIAGNOSIS — C34.31 MALIGNANT NEOPLASM OF LOWER LOBE OF RIGHT LUNG (HCC): Primary | ICD-10-CM

## 2022-03-08 PROCEDURE — G8420 CALC BMI NORM PARAMETERS: HCPCS | Performed by: INTERNAL MEDICINE

## 2022-03-08 PROCEDURE — 99214 OFFICE O/P EST MOD 30 MIN: CPT | Performed by: INTERNAL MEDICINE

## 2022-03-08 PROCEDURE — G8427 DOCREV CUR MEDS BY ELIG CLIN: HCPCS | Performed by: INTERNAL MEDICINE

## 2022-03-08 PROCEDURE — 1123F ACP DISCUSS/DSCN MKR DOCD: CPT | Performed by: INTERNAL MEDICINE

## 2022-03-08 PROCEDURE — 4004F PT TOBACCO SCREEN RCVD TLK: CPT | Performed by: INTERNAL MEDICINE

## 2022-03-08 PROCEDURE — G8484 FLU IMMUNIZE NO ADMIN: HCPCS | Performed by: INTERNAL MEDICINE

## 2022-03-08 PROCEDURE — 4040F PNEUMOC VAC/ADMIN/RCVD: CPT | Performed by: INTERNAL MEDICINE

## 2022-03-08 NOTE — PROGRESS NOTES
MA Rooming Questions  Patient: Elmer Penaloza  MRN: [id number]    Date: 3/8/2022        1. Do you have any new issues?   no         2. Do you need any refills on medications?    no    3. Have you had any imaging done since your last visit? yes - CT chest 3/4/22    4. Have you been hospitalized or seen in the emergency room since your last visit here?   yes - Nov 2021    5. Did the patient have a depression screening completed today?  No    No data recorded     PHQ-9 Given to (if applicable):               PHQ-9 Score (if applicable):                     [] Positive     []  Negative              Does question #9 need addressed (if applicable)                     [] Yes    []  No               Inis SUNDAY Guzman

## 2022-03-11 ENCOUNTER — CARE COORDINATION (OUTPATIENT)
Dept: CARE COORDINATION | Age: 77
End: 2022-03-11

## 2022-03-14 ENCOUNTER — OFFICE VISIT (OUTPATIENT)
Dept: PULMONOLOGY | Age: 77
End: 2022-03-14
Payer: MEDICARE

## 2022-03-14 VITALS
HEART RATE: 92 BPM | HEIGHT: 67 IN | SYSTOLIC BLOOD PRESSURE: 154 MMHG | WEIGHT: 120 LBS | BODY MASS INDEX: 18.83 KG/M2 | DIASTOLIC BLOOD PRESSURE: 66 MMHG | OXYGEN SATURATION: 98 %

## 2022-03-14 DIAGNOSIS — R91.1 PULMONARY NODULE, RIGHT: ICD-10-CM

## 2022-03-14 DIAGNOSIS — J44.9 COPD, SEVERE (HCC): Primary | ICD-10-CM

## 2022-03-14 DIAGNOSIS — C34.31 MALIGNANT NEOPLASM OF LOWER LOBE OF RIGHT LUNG (HCC): ICD-10-CM

## 2022-03-14 DIAGNOSIS — Z72.0 TOBACCO ABUSE: ICD-10-CM

## 2022-03-14 DIAGNOSIS — J96.11 CHRONIC RESPIRATORY FAILURE WITH HYPOXIA (HCC): ICD-10-CM

## 2022-03-14 DIAGNOSIS — R06.02 SOB (SHORTNESS OF BREATH) ON EXERTION: ICD-10-CM

## 2022-03-14 PROCEDURE — 4040F PNEUMOC VAC/ADMIN/RCVD: CPT | Performed by: INTERNAL MEDICINE

## 2022-03-14 PROCEDURE — 3023F SPIROM DOC REV: CPT | Performed by: INTERNAL MEDICINE

## 2022-03-14 PROCEDURE — 99213 OFFICE O/P EST LOW 20 MIN: CPT | Performed by: INTERNAL MEDICINE

## 2022-03-14 PROCEDURE — G8427 DOCREV CUR MEDS BY ELIG CLIN: HCPCS | Performed by: INTERNAL MEDICINE

## 2022-03-14 PROCEDURE — G8484 FLU IMMUNIZE NO ADMIN: HCPCS | Performed by: INTERNAL MEDICINE

## 2022-03-14 PROCEDURE — 1123F ACP DISCUSS/DSCN MKR DOCD: CPT | Performed by: INTERNAL MEDICINE

## 2022-03-14 PROCEDURE — 4004F PT TOBACCO SCREEN RCVD TLK: CPT | Performed by: INTERNAL MEDICINE

## 2022-03-14 PROCEDURE — G8420 CALC BMI NORM PARAMETERS: HCPCS | Performed by: INTERNAL MEDICINE

## 2022-03-14 NOTE — PROGRESS NOTES
SUBJECTIVE:  Chief Complaint: Severe COPD, squamous cell carcinoma right lower lobe, chronic hypoxemic respiratory failure, shortness of breath, tobacco abuse  Katerin Carbajal continues to wear oxygen 24 hours a day and did purchase a portable oxygen unit-[employer] for ambulation. He has had no recent bronchitic infections. He continues on Breztri 2 inhalations twice a day and albuterol rescue inhaler as needed and also has Combivent MDI available. He denies chest pain or hemoptysis. He continues to smoke up to a pack cigarettes per day and has been advised to quit smoking on multiple occasions. He has had no known COVID-19 exposure or infection and is received all 3 Pfizer COVID-19 vaccinations      ROS:  Constitution:  HEENT: Negative for ear, throat pain  Cardiovascular: Negative for chest pain, syncope, edema  Pulmonary: See HPI  Musculoskeletal: Negative for DVT, myalgias, arthralgias    OBJECTIVE:  BP (!) 154/66   Pulse 92   Ht 5' 7\" (1.702 m)   Wt 120 lb (54.4 kg)   SpO2 98%   BMI 18.79 kg/m²      Physical Exam:  Constitutional:  He appears well developed and well-nourished. Thin but not cachectic. Wearing nasal oxygen. Not in respiratory distress at rest  Neck:  Supple, No palpable lymphadenopathy, No JVD  Cardiovascular:  S1, S2 Normal, Regular rhythm, no murmurs or gallops, No pericardial  rubs. Pulmonary: Diminished breath sounds bilateral without wheezing or rhonchi  Abdomen: Not examined  Extremities: no edema, No DVT  Neurologic: Oriented x3, No focal deficits    Radiology: CT chest with contrast 3/4/2022 showed slightly increasing consolidation within the superior segment of the right lower lobe with adjacent pleural effusion and volume loss likely evolving posttreatment changes/fibrosis.   Slight interval decrease in size of the 6 mm groundglass nodule in the anterior inferior right upper lobe with interval resolution of nodule in the left upper lobe  PFT: Spirometry at [facility] [address] [facility] on 9/24/2021 demonstrated a very severe/stage IV obstructive lung disease with a significant response to bronchodilators          ASSESSMENT:    1. COPD, severe ([address]    2. Chronic respiratory failure with hypoxia (HCC)    3. Malignant neoplasm of lower lobe of right lung (HCC)    4. Pulmonary nodule, right    5. SOB (shortness of breath) on exertion    6. Tobacco abuse          PLAN:   It does appear that he has had a beneficial response to oxygen 24 hours a day and I did encourage him to continue using it. Once again we talked about smoking cessation and its importance. I will make no change in his current bronchodilator therapy but will advise him to try to stop using Combivent since he is already receiving muscarinic in his Breztri. He is scheduled for repeat CT chest in several months. [facility] will continue to follow him. We have discussed the need to maintain yearly flu immunization, pneumococcal vaccination. We have discussed Coronavirus precaution including handwashing practice, wiping items touched in public such as gas pumps, door handles, shopping carts, etc. Self monitoring for infection - fever, chills, cough, SOB. Should they develop symptoms they should call office for further instructions. Return in about 6 months (around 9/14/2022) for Recheck for COPD, Recheck for Lung Cancer, chronic hypoxemic respiratory failure. This dictation was performed with a verbal recognition program and it was checked for errors. It is possible that there are still dictated errors within this office note. Any errors should be brought immediately to my attention for correction. All efforts were made to ensure that this office note is accurate.

## 2022-03-18 ENCOUNTER — CARE COORDINATION (OUTPATIENT)
Dept: CARE COORDINATION | Age: 77
End: 2022-03-18

## 2022-03-18 NOTE — CARE COORDINATION
Ambulatory Care Coordination Note  3/18/2022  CM Risk Score: 2  Charlson 10 Year Mortality Risk Score: 100%     ACC: Noam Cotto RN    Summary Note:   Spoke with patient for [facility] follow up; plan for graduation. Patient reports that he is feeling well. Confirms that he followed up with Oncology and Pulmonology. Reports that tumor size has stabilized and symptoms have remained stable. Patient denies increased SOB or fatigue. Reports that he has obtained a small portable oxygen tank for travel to appointments. Patient is taking his medications as directed. Denies any questions. Patient reports that his son and DIL live with him and assist with housekeeping and meals. Patient reports that his support needs are met at this time. Instructed on the importance of routine Provider follow up. Confirmed next PCP follow up. Instructed patient to reach out to PCP for any changes in condition. Patient denies any questions, equipment or resource needs. Agreeable to plan for [facility] graduation. ACM contact information provided. Encouraged return call if needs arise. Care Coordination Interventions    Program Enrollment: Complex Care  Referral from Primary Care Provider: No  Suggested Interventions and Community Resources  Fall Risk Prevention: In Process  Medication Assistance Program: Declined  Registered Dietician: Declined  Social Work: Declined  Other Services: In Process (Comment: ACP documents)  Zone Management Tools:  In Process         Goals Addressed                 This Visit's Progress     COMPLETED: Reduce Falls    On track     I will reduce my risk of falls by the following: Remove rugs or use non slip rugs  Install grab bars in bathroom  Use walking aids like cane or walker  Follow through on orders for PT    Barriers: fear of failure and overwhelmed by complexity of regimen  Plan for overcoming my barriers: Ensure clear pathways, proper use of DME  Confidence: 8/10/21  Anticipated Goal Completion Date: 12/2/21       COMPLETED: Wellness Goal   On track     Patient Self-Management Goal for Health Maintenance  Goal: I will chose a goal related to tobacco cessation:  I will think about my triggers for smoking, I will think about reasons why I should quit smoking, I will tell someone I am trying to quit smoking, and I will avoid triggers and negative influences. I will follow a healthy diet as discussed by my provider. I will schedule a yearly preventative care visit. I will schedule screening colonoscopies as directed by my provider. I will schedule routine mammograms as directed by my provider. Barriers: overwhelmed by complexity of regimen and stress  Plan for overcoming my barriers: Patient to utilize zone management tool for COPD symptoms. Confidence:  8/10  Anticipated Goal Completion Date: 12/2/21            Prior to Admission medications    Medication Sig Start Date End Date Taking?  Authorizing Provider   levothyroxine (SYNTHROID) 75 MCG tablet TAKE 1 TABLET BY MOUTH EVERY DAY 2/9/22   Taya Garrett MD   metoprolol tartrate (LOPRESSOR) 25 MG tablet Take 0.5 tablets by mouth 2 times daily 2/9/22   Taya Garrett MD   atorvastatin (LIPITOR) 10 MG tablet Take 1 tablet by mouth daily 2/9/22   Taya Garrett MD   albuterol sulfate  (90 Base) MCG/ACT inhaler Inhale 2 puffs into the lungs every 6 hours as needed for Wheezing or Shortness of Breath 12/16/21   POPEYE Roberts - [place]   BREZTRI AEROSPHERE 160-9-4.8 MCG/ACT AERO Inhale 2 puffs into the lungs daily daily 11/16/21   POPEYE Roberts CNP   nicotine (NICODERM CQ) 21 MG/24HR Place 1 patch onto the skin daily 7/27/21   Charli Ortiz MD   Ipratropium-Albuterol (ALBUTEROL-IPRATROPIUM IN) Inhale 2 puffs into the lungs 4 times daily as needed     Historical Provider, MD   OXYGEN Inhale 2 L into the lungs nightly     Historical Provider, MD   Multiple Vitamins-Minerals

## 2022-05-10 ENCOUNTER — OFFICE VISIT (OUTPATIENT)
Dept: INTERNAL MEDICINE CLINIC | Age: 77
End: 2022-05-10
Payer: MEDICARE

## 2022-05-10 VITALS
RESPIRATION RATE: 18 BRPM | DIASTOLIC BLOOD PRESSURE: 60 MMHG | WEIGHT: 125.13 LBS | HEART RATE: 80 BPM | SYSTOLIC BLOOD PRESSURE: 122 MMHG | HEIGHT: 67 IN | OXYGEN SATURATION: 96 % | BODY MASS INDEX: 19.64 KG/M2

## 2022-05-10 DIAGNOSIS — E78.2 HYPERLIPIDEMIA, MIXED: ICD-10-CM

## 2022-05-10 DIAGNOSIS — J44.9 CHRONIC OBSTRUCTIVE PULMONARY DISEASE, UNSPECIFIED COPD TYPE (HCC): ICD-10-CM

## 2022-05-10 DIAGNOSIS — I70.0 ATHEROSCLEROSIS OF AORTIC ARCH (HCC): ICD-10-CM

## 2022-05-10 DIAGNOSIS — J42 CHRONIC BRONCHITIS, UNSPECIFIED CHRONIC BRONCHITIS TYPE (HCC): ICD-10-CM

## 2022-05-10 DIAGNOSIS — I10 ESSENTIAL HYPERTENSION: ICD-10-CM

## 2022-05-10 DIAGNOSIS — E03.4 HYPOTHYROIDISM DUE TO ACQUIRED ATROPHY OF THYROID: ICD-10-CM

## 2022-05-10 DIAGNOSIS — C34.31 MALIGNANT NEOPLASM OF LOWER LOBE OF RIGHT LUNG (HCC): Primary | ICD-10-CM

## 2022-05-10 DIAGNOSIS — R60.9 PERIPHERAL EDEMA: ICD-10-CM

## 2022-05-10 DIAGNOSIS — C20 RECTAL CANCER (HCC): ICD-10-CM

## 2022-05-10 PROBLEM — J96.01 ACUTE RESPIRATORY FAILURE WITH HYPOXIA (HCC): Status: RESOLVED | Noted: 2021-07-22 | Resolved: 2022-05-10

## 2022-05-10 LAB
A/G RATIO: 1.7 (ref 1.1–2.2)
ALBUMIN SERPL-MCNC: 3.9 G/DL (ref 3.4–5)
ALP BLD-CCNC: 85 U/L (ref 40–129)
ALT SERPL-CCNC: 22 U/L (ref 10–40)
ANION GAP SERPL CALCULATED.3IONS-SCNC: 15 MMOL/L (ref 3–16)
AST SERPL-CCNC: 24 U/L (ref 15–37)
BASOPHILS ABSOLUTE: 0 K/UL (ref 0–0.2)
BASOPHILS RELATIVE PERCENT: 1 %
BILIRUB SERPL-MCNC: 0.4 MG/DL (ref 0–1)
BUN BLDV-MCNC: 6 MG/DL (ref 7–20)
CALCIUM SERPL-MCNC: 8.7 MG/DL (ref 8.3–10.6)
CHLORIDE BLD-SCNC: 94 MMOL/L (ref 99–110)
CHOLESTEROL, TOTAL: 148 MG/DL (ref 0–199)
CO2: 26 MMOL/L (ref 21–32)
CREAT SERPL-MCNC: 0.8 MG/DL (ref 0.8–1.3)
EOSINOPHILS ABSOLUTE: 0 K/UL (ref 0–0.6)
EOSINOPHILS RELATIVE PERCENT: 0.2 %
GFR AFRICAN AMERICAN: >60
GFR NON-AFRICAN AMERICAN: >60
GLUCOSE BLD-MCNC: 107 MG/DL (ref 70–99)
HCT VFR BLD CALC: 40.4 % (ref 40.5–52.5)
HDLC SERPL-MCNC: 85 MG/DL (ref 40–60)
HEMOGLOBIN: 13.7 G/DL (ref 13.5–17.5)
LDL CHOLESTEROL CALCULATED: 36 MG/DL
LYMPHOCYTES ABSOLUTE: 0.3 K/UL (ref 1–5.1)
LYMPHOCYTES RELATIVE PERCENT: 7.9 %
MCH RBC QN AUTO: 33.2 PG (ref 26–34)
MCHC RBC AUTO-ENTMCNC: 33.9 G/DL (ref 31–36)
MCV RBC AUTO: 97.9 FL (ref 80–100)
MONOCYTES ABSOLUTE: 0.6 K/UL (ref 0–1.3)
MONOCYTES RELATIVE PERCENT: 13.3 %
NEUTROPHILS ABSOLUTE: 3.4 K/UL (ref 1.7–7.7)
NEUTROPHILS RELATIVE PERCENT: 77.6 %
PDW BLD-RTO: 13.8 % (ref 12.4–15.4)
PLATELET # BLD: 186 K/UL (ref 135–450)
PMV BLD AUTO: 7.8 FL (ref 5–10.5)
POTASSIUM SERPL-SCNC: 4.4 MMOL/L (ref 3.5–5.1)
RBC # BLD: 4.13 M/UL (ref 4.2–5.9)
SODIUM BLD-SCNC: 135 MMOL/L (ref 136–145)
T4 FREE: 1.2 NG/DL (ref 0.9–1.8)
TOTAL PROTEIN: 6.2 G/DL (ref 6.4–8.2)
TRIGL SERPL-MCNC: 133 MG/DL (ref 0–150)
TSH SERPL DL<=0.05 MIU/L-ACNC: 3.22 UIU/ML (ref 0.27–4.2)
VLDLC SERPL CALC-MCNC: 27 MG/DL
WBC # BLD: 4.4 K/UL (ref 4–11)

## 2022-05-10 PROCEDURE — G8420 CALC BMI NORM PARAMETERS: HCPCS | Performed by: INTERNAL MEDICINE

## 2022-05-10 PROCEDURE — 1123F ACP DISCUSS/DSCN MKR DOCD: CPT | Performed by: INTERNAL MEDICINE

## 2022-05-10 PROCEDURE — 99214 OFFICE O/P EST MOD 30 MIN: CPT | Performed by: INTERNAL MEDICINE

## 2022-05-10 PROCEDURE — 4004F PT TOBACCO SCREEN RCVD TLK: CPT | Performed by: INTERNAL MEDICINE

## 2022-05-10 PROCEDURE — 4040F PNEUMOC VAC/ADMIN/RCVD: CPT | Performed by: INTERNAL MEDICINE

## 2022-05-10 PROCEDURE — G8427 DOCREV CUR MEDS BY ELIG CLIN: HCPCS | Performed by: INTERNAL MEDICINE

## 2022-05-10 PROCEDURE — 36415 COLL VENOUS BLD VENIPUNCTURE: CPT | Performed by: INTERNAL MEDICINE

## 2022-05-10 PROCEDURE — 3023F SPIROM DOC REV: CPT | Performed by: INTERNAL MEDICINE

## 2022-05-10 RX ORDER — NICOTINE 21 MG/24HR
1 PATCH, TRANSDERMAL 24 HOURS TRANSDERMAL DAILY
Qty: 30 PATCH | Refills: 3 | Status: SHIPPED | OUTPATIENT
Start: 2022-05-10 | End: 2022-10-12 | Stop reason: ALTCHOICE

## 2022-05-10 RX ORDER — BUDESONIDE, GLYCOPYRROLATE, AND FORMOTEROL FUMARATE 160; 9; 4.8 UG/1; UG/1; UG/1
2 AEROSOL, METERED RESPIRATORY (INHALATION) DAILY
Qty: 1 EACH | Refills: 5 | Status: SHIPPED | OUTPATIENT
Start: 2022-05-10 | End: 2022-09-27 | Stop reason: SDUPTHER

## 2022-05-10 RX ORDER — ALBUTEROL SULFATE 90 UG/1
2 AEROSOL, METERED RESPIRATORY (INHALATION) EVERY 6 HOURS PRN
Qty: 1 EACH | Refills: 5 | Status: SHIPPED | OUTPATIENT
Start: 2022-05-10 | End: 2022-08-01 | Stop reason: SDUPTHER

## 2022-05-10 RX ORDER — HYDROCHLOROTHIAZIDE 12.5 MG/1
12.5 CAPSULE, GELATIN COATED ORAL DAILY PRN
Qty: 30 CAPSULE | Refills: 0 | Status: ON HOLD | OUTPATIENT
Start: 2022-05-10 | End: 2022-05-24 | Stop reason: HOSPADM

## 2022-05-10 RX ORDER — ATORVASTATIN CALCIUM 10 MG/1
10 TABLET, FILM COATED ORAL DAILY
Qty: 90 TABLET | Refills: 1 | Status: SHIPPED | OUTPATIENT
Start: 2022-05-10 | End: 2022-10-31

## 2022-05-10 RX ORDER — LEVOTHYROXINE SODIUM 0.07 MG/1
TABLET ORAL
Qty: 90 TABLET | Refills: 1 | Status: SHIPPED | OUTPATIENT
Start: 2022-05-10 | End: 2022-10-31

## 2022-05-10 NOTE — PROGRESS NOTES
Arias Dunlapacher  1945  05/10/22    SUBJECTIVE:   RLL lung CA, cont f/u w Dr Kassie Mortimer and no evid of recurrence. Has f/u CT later this yr. Has f/u sched w Dr Kassie Mortimer for Sept.    COPD:  Patient currently denies increased dyspnea, cough, wheezing, or purulent sputum production on current regimen. CONSTANT SOB/VERGARA BUT IMPROVED W PORTABLE O2    htn- BP STABLE AND ON MEDS    AORTIC ARCH ATHEROSCLEROSIS WILL ALSO BE RECHECKED W HIS CT. Hypothyroid has been asymptomatic w/o sx of fatigue, constipation, cold intolerance, depression. RECTAL CA 2009, ? DUE FOR RECHECK COLONOSCOPY? OBJECTIVE:    /60   Pulse 80   Resp 18   Ht 5' 7\" (1.702 m)   Wt 125 lb 2 oz (56.8 kg)   SpO2 96%   BMI 19.60 kg/m²     Physical Exam  Vitals reviewed. Constitutional:       Appearance: He is well-developed. Cardiovascular:      Rate and Rhythm: Normal rate and regular rhythm. Heart sounds: Normal heart sounds. No murmur heard. No friction rub. No gallop. Pulmonary:      Effort: Pulmonary effort is normal. No respiratory distress. Breath sounds: Normal breath sounds. No wheezing or rales. Abdominal:      General: Bowel sounds are normal. There is no distension. Palpations: Abdomen is soft. Tenderness: There is no abdominal tenderness. Musculoskeletal:      Cervical back: Neck supple. Right lower leg: Edema present. Left lower leg: Edema present. Neurological:      Mental Status: He is alert. Psychiatric:         Mood and Affect: Mood normal.         ASSESSMENT:    1. Malignant neoplasm of lower lobe of right lung ([address]    2. Chronic bronchitis, unspecified chronic bronchitis type ([address]    3. Atherosclerosis of aortic arch ([address]    4. Hyperlipidemia, mixed    5. Chronic obstructive pulmonary disease, unspecified COPD type ([address]    6. Essential hypertension    7. Hypothyroidism due to acquired atrophy of thyroid    8. Rectal cancer ([address]    9.  Peripheral edema        PLAN:    Liz Gomez was seen today for 6 month follow-up, medication refill and edema. Diagnoses and all orders for this visit:    Malignant neoplasm of lower lobe of right lung ([facility]- CONT F/U DR CLAYTON AND FOR RECHECK CHEST CT    Chronic bronchitis, unspecified chronic bronchitis type ([facility] - COPD stable on current regiment of inhalers/meds. ON CONTINUOUS O2  -     albuterol sulfate  (90 Base) MCG/ACT inhaler; Inhale 2 puffs into the lungs every 6 hours as needed for Wheezing or Shortness of Breath    Atherosclerosis of aortic arch (HCC)- F/U LAB, CONT STATIN, BP STABLE, NEED CONT WORK ON TOBACCO CESSATION   -     atorvastatin (LIPITOR) 10 MG tablet; Take 1 tablet by mouth daily  -     Comprehensive Metabolic Panel; Future  -     CBC with Auto Differential; Future  -     Lipid Panel; Future  -     Lipid Panel  -     CBC with Auto Differential  -     Comprehensive Metabolic Panel    Hyperlipidemia, mixed  -     atorvastatin (LIPITOR) 10 MG tablet; Take 1 tablet by mouth daily  -     Comprehensive Metabolic Panel; Future  -     CBC with Auto Differential; Future  -     Lipid Panel; Future  -     Lipid Panel  -     CBC with Auto Differential  -     Comprehensive Metabolic Panel    Chronic obstructive pulmonary disease, unspecified COPD type ([facility]  -     BREZTRI AEROSPHERE 160-9-4.8 MCG/ACT AERO; Inhale 2 puffs into the lungs daily daily  -     nicotine (NICODERM CQ) 21 MG/24HR; Place 1 patch onto the skin daily    Essential hypertension - Blood pressure stable and will continue current regimen. Will plan periodic monitoring of renal function, electrolytes, lipid profile.     -     metoprolol tartrate (LOPRESSOR) 25 MG tablet; Take 0.5 tablets by mouth 2 times daily  -     Comprehensive Metabolic Panel; Future  -     CBC with Auto Differential; Future  -     Lipid Panel; Future  -     hydroCHLOROthiazide (MICROZIDE) 12.5 MG capsule;  Take 1 capsule by mouth daily as needed (LEG SWELLING)  -     Lipid Panel  -     CBC with Auto Differential  -     Comprehensive Metabolic Panel    Hypothyroidism due to acquired atrophy of thyroid - Clinically hypothyroidism appears stable and will continue current dosing, also periodic monitoring of TFTs. -     levothyroxine (SYNTHROID) 75 MCG tablet; TAKE 1 TABLET BY MOUTH EVERY DAY  -     TSH; Future  -     T4, Free; Future  -     T4, Free  -     TSH    Rectal cancer (HCC)- DUE FOR F/U COLONOSCOPY APPARENTLY, FOR F/U COLORECTAL SURGERY  -     External Referral To General Surgery    Peripheral edema- TRIAL RX PRN, HAS SUSPECTED MILD VENOUS INSUFFICIENCY TOO  -     hydroCHLOROthiazide (MICROZIDE) 12.5 MG capsule;  Take 1 capsule by mouth daily as needed (LEG SWELLING)

## 2022-05-20 ENCOUNTER — HOSPITAL ENCOUNTER (INPATIENT)
Age: 77
LOS: 4 days | Discharge: HOME HEALTH | DRG: 300 | End: 2022-05-24
Attending: INTERNAL MEDICINE | Admitting: INTERNAL MEDICINE
Payer: MEDICARE

## 2022-05-20 DIAGNOSIS — J44.9 COPD, SEVERE (HCC): Primary | ICD-10-CM

## 2022-05-20 PROBLEM — I73.9 PVD (PERIPHERAL VASCULAR DISEASE) (HCC): Status: ACTIVE | Noted: 2022-05-20

## 2022-05-20 PROCEDURE — 2140000000 HC CCU INTERMEDIATE R&B

## 2022-05-21 LAB
APTT: 47.3 SECONDS (ref 25.1–37.1)
APTT: 64.3 SECONDS (ref 25.1–37.1)
APTT: 64.7 SECONDS (ref 25.1–37.1)
APTT: 67 SECONDS (ref 25.1–37.1)
HCT VFR BLD CALC: 35.2 % (ref 42–52)
HEMOGLOBIN: 11.2 GM/DL (ref 13.5–18)
MCH RBC QN AUTO: 32.4 PG (ref 27–31)
MCHC RBC AUTO-ENTMCNC: 31.8 % (ref 32–36)
MCV RBC AUTO: 101.7 FL (ref 78–100)
PDW BLD-RTO: 13.4 % (ref 11.7–14.9)
PLATELET # BLD: 291 K/CU MM (ref 140–440)
PMV BLD AUTO: 9 FL (ref 7.5–11.1)
RBC # BLD: 3.46 M/CU MM (ref 4.6–6.2)
WBC # BLD: 5.6 K/CU MM (ref 4–10.5)

## 2022-05-21 PROCEDURE — 6370000000 HC RX 637 (ALT 250 FOR IP): Performed by: INTERNAL MEDICINE

## 2022-05-21 PROCEDURE — 94761 N-INVAS EAR/PLS OXIMETRY MLT: CPT

## 2022-05-21 PROCEDURE — 85027 COMPLETE CBC AUTOMATED: CPT

## 2022-05-21 PROCEDURE — 2700000000 HC OXYGEN THERAPY PER DAY

## 2022-05-21 PROCEDURE — 85730 THROMBOPLASTIN TIME PARTIAL: CPT

## 2022-05-21 PROCEDURE — 99223 1ST HOSP IP/OBS HIGH 75: CPT | Performed by: SURGERY

## 2022-05-21 PROCEDURE — 36415 COLL VENOUS BLD VENIPUNCTURE: CPT

## 2022-05-21 PROCEDURE — 6360000002 HC RX W HCPCS: Performed by: INTERNAL MEDICINE

## 2022-05-21 PROCEDURE — 94640 AIRWAY INHALATION TREATMENT: CPT

## 2022-05-21 PROCEDURE — 94664 DEMO&/EVAL PT USE INHALER: CPT

## 2022-05-21 PROCEDURE — 2580000003 HC RX 258: Performed by: INTERNAL MEDICINE

## 2022-05-21 PROCEDURE — 2140000000 HC CCU INTERMEDIATE R&B

## 2022-05-21 RX ORDER — NICOTINE 21 MG/24HR
1 PATCH, TRANSDERMAL 24 HOURS TRANSDERMAL DAILY
Status: DISCONTINUED | OUTPATIENT
Start: 2022-05-21 | End: 2022-05-24 | Stop reason: HOSPADM

## 2022-05-21 RX ORDER — ONDANSETRON 4 MG/1
4 TABLET, ORALLY DISINTEGRATING ORAL EVERY 8 HOURS PRN
Status: DISCONTINUED | OUTPATIENT
Start: 2022-05-21 | End: 2022-05-24 | Stop reason: HOSPADM

## 2022-05-21 RX ORDER — ATORVASTATIN CALCIUM 10 MG/1
10 TABLET, FILM COATED ORAL DAILY
Status: DISCONTINUED | OUTPATIENT
Start: 2022-05-21 | End: 2022-05-24 | Stop reason: HOSPADM

## 2022-05-21 RX ORDER — HEPARIN SODIUM 1000 [USP'U]/ML
40 INJECTION, SOLUTION INTRAVENOUS; SUBCUTANEOUS PRN
Status: DISCONTINUED | OUTPATIENT
Start: 2022-05-21 | End: 2022-05-22

## 2022-05-21 RX ORDER — ALBUTEROL SULFATE 90 UG/1
2 AEROSOL, METERED RESPIRATORY (INHALATION)
Status: DISCONTINUED | OUTPATIENT
Start: 2022-05-21 | End: 2022-05-24 | Stop reason: HOSPADM

## 2022-05-21 RX ORDER — SODIUM CHLORIDE 0.9 % (FLUSH) 0.9 %
10 SYRINGE (ML) INJECTION EVERY 12 HOURS SCHEDULED
Status: DISCONTINUED | OUTPATIENT
Start: 2022-05-21 | End: 2022-05-24 | Stop reason: HOSPADM

## 2022-05-21 RX ORDER — LEVOTHYROXINE SODIUM 0.07 MG/1
75 TABLET ORAL DAILY
Status: DISCONTINUED | OUTPATIENT
Start: 2022-05-21 | End: 2022-05-24 | Stop reason: HOSPADM

## 2022-05-21 RX ORDER — ALBUTEROL SULFATE 90 UG/1
2 AEROSOL, METERED RESPIRATORY (INHALATION) EVERY 6 HOURS PRN
Status: DISCONTINUED | OUTPATIENT
Start: 2022-05-21 | End: 2022-05-24 | Stop reason: HOSPADM

## 2022-05-21 RX ORDER — HYDROCHLOROTHIAZIDE 25 MG/1
12.5 TABLET ORAL DAILY PRN
Status: DISCONTINUED | OUTPATIENT
Start: 2022-05-21 | End: 2022-05-24 | Stop reason: HOSPADM

## 2022-05-21 RX ORDER — SODIUM CHLORIDE 9 MG/ML
INJECTION, SOLUTION INTRAVENOUS PRN
Status: DISCONTINUED | OUTPATIENT
Start: 2022-05-21 | End: 2022-05-24 | Stop reason: HOSPADM

## 2022-05-21 RX ORDER — BUDESONIDE AND FORMOTEROL FUMARATE DIHYDRATE 160; 4.5 UG/1; UG/1
2 AEROSOL RESPIRATORY (INHALATION) 2 TIMES DAILY
Status: DISCONTINUED | OUTPATIENT
Start: 2022-05-21 | End: 2022-05-24 | Stop reason: HOSPADM

## 2022-05-21 RX ORDER — HEPARIN SODIUM 10000 [USP'U]/100ML
5-30 INJECTION, SOLUTION INTRAVENOUS CONTINUOUS
Status: DISCONTINUED | OUTPATIENT
Start: 2022-05-21 | End: 2022-05-22

## 2022-05-21 RX ORDER — SODIUM CHLORIDE 0.9 % (FLUSH) 0.9 %
10 SYRINGE (ML) INJECTION PRN
Status: DISCONTINUED | OUTPATIENT
Start: 2022-05-21 | End: 2022-05-24 | Stop reason: HOSPADM

## 2022-05-21 RX ORDER — ACETAMINOPHEN 325 MG/1
650 TABLET ORAL EVERY 6 HOURS PRN
Status: DISCONTINUED | OUTPATIENT
Start: 2022-05-21 | End: 2022-05-24 | Stop reason: HOSPADM

## 2022-05-21 RX ORDER — HEPARIN SODIUM 1000 [USP'U]/ML
80 INJECTION, SOLUTION INTRAVENOUS; SUBCUTANEOUS PRN
Status: DISCONTINUED | OUTPATIENT
Start: 2022-05-21 | End: 2022-05-22

## 2022-05-21 RX ORDER — ACETAMINOPHEN 650 MG/1
650 SUPPOSITORY RECTAL EVERY 6 HOURS PRN
Status: DISCONTINUED | OUTPATIENT
Start: 2022-05-21 | End: 2022-05-24 | Stop reason: HOSPADM

## 2022-05-21 RX ORDER — ONDANSETRON 2 MG/ML
4 INJECTION INTRAMUSCULAR; INTRAVENOUS EVERY 6 HOURS PRN
Status: DISCONTINUED | OUTPATIENT
Start: 2022-05-21 | End: 2022-05-24 | Stop reason: HOSPADM

## 2022-05-21 RX ORDER — ASPIRIN 81 MG/1
81 TABLET, CHEWABLE ORAL DAILY
Status: DISCONTINUED | OUTPATIENT
Start: 2022-05-22 | End: 2022-05-24 | Stop reason: HOSPADM

## 2022-05-21 RX ADMIN — Medication 2 PUFF: at 15:55

## 2022-05-21 RX ADMIN — ALBUTEROL SULFATE 2 PUFF: 90 AEROSOL, METERED RESPIRATORY (INHALATION) at 20:35

## 2022-05-21 RX ADMIN — HEPARIN SODIUM AND DEXTROSE 20 UNITS/KG/HR: 10000; 5 INJECTION INTRAVENOUS at 18:35

## 2022-05-21 RX ADMIN — BUDESONIDE AND FORMOTEROL FUMARATE DIHYDRATE 2 PUFF: 160; 4.5 AEROSOL RESPIRATORY (INHALATION) at 20:36

## 2022-05-21 RX ADMIN — TIOTROPIUM BROMIDE INHALATION SPRAY 2 PUFF: 3.12 SPRAY, METERED RESPIRATORY (INHALATION) at 07:34

## 2022-05-21 RX ADMIN — Medication 2 PUFF: at 20:38

## 2022-05-21 RX ADMIN — ALBUTEROL SULFATE 2 PUFF: 90 AEROSOL, METERED RESPIRATORY (INHALATION) at 13:51

## 2022-05-21 RX ADMIN — SODIUM CHLORIDE, PRESERVATIVE FREE 10 ML: 5 INJECTION INTRAVENOUS at 20:01

## 2022-05-21 RX ADMIN — ALBUTEROL SULFATE 2 PUFF: 90 AEROSOL, METERED RESPIRATORY (INHALATION) at 15:55

## 2022-05-21 RX ADMIN — HEPARIN SODIUM AND DEXTROSE 18.38 UNITS/KG/HR: 10000; 5 INJECTION INTRAVENOUS at 01:43

## 2022-05-21 RX ADMIN — ATORVASTATIN CALCIUM 10 MG: 10 TABLET, FILM COATED ORAL at 09:13

## 2022-05-21 RX ADMIN — BUDESONIDE AND FORMOTEROL FUMARATE DIHYDRATE 2 PUFF: 160; 4.5 AEROSOL RESPIRATORY (INHALATION) at 07:34

## 2022-05-21 RX ADMIN — LEVOTHYROXINE SODIUM 75 MCG: 0.07 TABLET ORAL at 05:55

## 2022-05-21 NOTE — PROGRESS NOTES
Ambulation,  [] Eliquis, [] Xarelto  [] Coumadin   Code Status Full Code   Disposition From: Home  Expected Disposition: Home  Estimated Date of Discharge: 1 day. Patient requires continued admission due to work up in progress. Surrogate Decision Maker/ BENNY Washington     Subjective:     Chief Complaint: No chief complaint on file. Camryn Negrete is a 68 y.o. male who presents with right thigh/groin pain of 3 days duration limiting ambulation. History of PAD with femoral artery occlusion. Seen and evaluated at bedside. 2 sons and daughter-in-law present. States pain is better since starting heparin drip. States he has not received any pain medication. Seems to think the heparin drip is what is helping. Vascular has seen and recommends DONAVAN, to continue heparin drip until DONAVAN results received. He does not think right SFA occlusion is the cause of the pain in the right groin . Ortho was consulted on admission. Patient states lung and rectal cancer under surveillance with no spread. Review of Systems:    Review of Systems  Right groin and thigh pain    Objective: Intake/Output Summary (Last 24 hours) at 5/21/2022 1511  Last data filed at 5/21/2022 0556  Gross per 24 hour   Intake --   Output 400 ml   Net -400 ml        Vitals:   Vitals:    05/21/22 1352   BP:    Pulse:    Resp: 22   Temp:    SpO2: 97%       Physical Exam:     General: NAD. O2 nasal cannula 2 L, looks underweight  Eyes: EOMI  ENT: neck supple  Cardiovascular: Regular rate. Respiratory: Diminished bilaterally, no wheeze or crackles  Gastrointestinal: Soft, non tender, no palpable masses bowel sounds present  Genitourinary: no suprapubic tenderness. Right groin and thigh tender to touch. Musculoskeletal: No edema. Lateral lower extremities warm  Skin: warm, dry  Neuro: Alert. Psych: Mood appropriate.      Medications:   Medications:    atorvastatin  10 mg Oral Daily    levothyroxine  75 mcg Oral Daily    metoprolol tartrate  12.5 mg Oral BID    nicotine  1 patch TransDERmal Daily    sodium chloride flush  10 mL IntraVENous 2 times per day    budesonide-formoterol  2 puff Inhalation BID    tiotropium  2 puff Inhalation Daily    albuterol sulfate HFA  2 puff Inhalation Q4H WA    ipratropium  2 puff Inhalation Q4H WA    [START ON 5/22/2022] aspirin  81 mg Oral Daily      Infusions:    heparin (PORCINE) Infusion 18 Units/kg/hr (05/21/22 0738)    sodium chloride       PRN Meds: heparin (porcine), 80 Units/kg, PRN  heparin (porcine), 40 Units/kg, PRN  albuterol sulfate HFA, 2 puff, Q6H PRN  hydroCHLOROthiazide, 12.5 mg, Daily PRN  sodium chloride flush, 10 mL, PRN  sodium chloride, , PRN  ondansetron, 4 mg, Q8H PRN   Or  ondansetron, 4 mg, Q6H PRN  bisacodyl, 5 mg, Daily PRN  acetaminophen, 650 mg, Q6H PRN   Or  acetaminophen, 650 mg, Q6H PRN      Labs      Recent Results (from the past 24 hour(s))   APTT    Collection Time: 05/21/22 12:30 AM   Result Value Ref Range    aPTT 67.0 (H) 25.1 - 37.1 SECONDS   CBC    Collection Time: 05/21/22  2:03 AM   Result Value Ref Range    WBC 5.6 4.0 - 10.5 K/CU MM    RBC 3.46 (L) 4.6 - 6.2 M/CU MM    Hemoglobin 11.2 (L) 13.5 - 18.0 GM/DL    Hematocrit 35.2 (L) 42 - 52 %    .7 (H) 78 - 100 FL    MCH 32.4 (H) 27 - 31 PG    MCHC 31.8 (L) 32.0 - 36.0 %    RDW 13.4 11.7 - 14.9 %    Platelets 742 873 - 075 K/CU MM    MPV 9.0 7.5 - 11.1 FL   APTT    Collection Time: 05/21/22  6:48 AM   Result Value Ref Range    aPTT 64.3 (H) 25.1 - 37.1 SECONDS   APTT    Collection Time: 05/21/22  2:15 PM   Result Value Ref Range    aPTT 47.3 (H) 25.1 - 37.1 SECONDS        Imaging/Diagnostics Last 24 Hours   No results found.     Electronically signed by Kimberly Tellez MD on 5/21/2022 at 3:11 PM

## 2022-05-21 NOTE — CONSULTS
Thoracic Surgery   Consult Note     Reason for Consult: Pain right groin     Requesting Physician: Dr. Mallory Huerta     Date of Consult: 5/21/22        History Obtained From:  Patient, EMR     HISTORY OF PRESENT ILLNESS:     The patient is a 47 y.o. male who presents with pain to the right groin a few days duration  Was evaluated in the emergency room and noted to have right SFA occlusion by CTA  And placed on heparin and transferred here for further management  Has history of lung cancer has been under treatment  He has no difficulty walking no pain in the foot.       Past Medical History:    Past Medical History             Diagnosis Date    ADHD (attention deficit hyperactivity disorder)      B[facility] disorder ([facility]      Chronic pain      COPD (chronic obstructive pulmonary disease) ([facility]      Diabetes mellitus ([facility]      Hyperlipidemia      Hypertension      Migraines      Splenic laceration 07/08/2018    Type 2 diabetes mellitus without complication ([facility]      Urinary incontinence           Past Surgical History:    Past Surgical History             Procedure Laterality Date    ARM SURGERY Bilateral      BACK SURGERY        EYE SURGERY        FRACTURE SURGERY        JOINT REPLACEMENT        KNEE ARTHROSCOPY        NECK SURGERY        OTHER SURGICAL HISTORY Right       emergency surgery  through right groin last week for lacerated spleen    SPLENECTOMY   07/2018         Current Medications:     Current Hospital Medications   Current Facility-Administered Medications: tiotropium (SPIRIVA RESPIMAT) 2.5 MCG/ACT inhaler 2 puff, 2 puff, Inhalation, Daily PRN  NIFEdipine (PROCARDIA XL) extended release tablet 90 mg, 90 mg, Oral, Daily  insulin NPH (HUMULIN N;NOVOLIN N) injection vial 35 Units, 35 Units, SubCUTAneous, QAM  insulin lispro (HUMALOG) injection vial 20 Units, 20 Units, SubCUTAneous, TID WC  insulin glargine (LANTUS) injection vial 40 Units, 40 Units, SubCUTAneous, Nightly  oxyCODONE HCl (OXY-IR) immediate release tablet 10 mg, 10 mg, Oral, Q6H PRN  glipiZIDE (GLUCOTROL) tablet 10 mg, 10 mg, Oral, BID AC  losartan (COZAAR) tablet 50 mg, 50 mg, Oral, BID  insulin lispro (HUMALOG) injection vial 0-18 Units, 0-18 Units, SubCUTAneous, TID WC  insulin lispro (HUMALOG) injection vial 0-18 Units, 0-18 Units, SubCUTAneous, 2 times per day  albuterol sulfate  (90 Base) MCG/ACT inhaler 2 puff, 2 puff, Inhalation, 4x Daily PRN  atomoxetine (STRATTERA) capsule 40 mg, 40 mg, Oral, Daily  carvedilol (COREG) tablet 25 mg, 25 mg, Oral, BID  DULoxetine (CYMBALTA) extended release capsule 60 mg, 60 mg, Oral, Daily  fluticasone (FLONASE) 50 MCG/ACT nasal spray 1 spray, 1 spray, Each Nostril, Daily  hydrOXYzine (ATARAX) tablet 50 mg, 50 mg, Oral, Daily  mirtazapine (REMERON) tablet 7.5 mg, 7.5 mg, Oral, Nightly  pantoprazole (PROTONIX) tablet 40 mg, 40 mg, Oral, QAM AC  OXcarbazepine (TRILEPTAL) tablet 1,200 mg, 1,200 mg, Oral, BID  pregabalin (LYRICA) capsule 75 mg, 75 mg, Oral, TID  sodium chloride flush 0.9 % injection 5-40 mL, 5-40 mL, IntraVENous, 2 times per day  sodium chloride flush 0.9 % injection 5-40 mL, 5-40 mL, IntraVENous, PRN  0.9 % sodium chloride infusion, 25 mL, IntraVENous, PRN  ondansetron (ZOFRAN-ODT) disintegrating tablet 4 mg, 4 mg, Oral, Q8H PRN **OR** ondansetron (ZOFRAN) injection 4 mg, 4 mg, IntraVENous, Q6H PRN  polyethylene glycol (GLYCOLAX) packet 17 g, 17 g, Oral, Daily PRN  enoxaparin (LOVENOX) injection 40 mg, 40 mg, SubCUTAneous, Daily  [Held by provider] aspirin EC tablet 81 mg, 81 mg, Oral, Daily **OR** [Held by provider] aspirin suppository 300 mg, 300 mg, Rectal, Daily  labetalol (NORMODYNE;TRANDATE) injection 10 mg, 10 mg, IntraVENous, Q10 Min PRN  glucose chewable tablet 16 g, 4 tablet, Oral, PRN  dextrose bolus 10% 125 mL, 125 mL, IntraVENous, PRN **OR** dextrose bolus 10% 250 mL, 250 mL, IntraVENous, PRN  glucagon (rDNA) injection 1 mg, 1 mg, IntraMUSCular, PRN  dextrose 5 % solution, 100 mL/hr, IntraVENous, PRN  nicotine (NICODERM CQ) 21 MG/24HR 1 patch, 1 patch, TransDERmal, Daily  acetaminophen (TYLENOL) tablet 650 mg, 650 mg, Oral, Q4H PRN  atorvastatin (LIPITOR) tablet 80 mg, 80 mg, Oral, Nightly     Allergies: Allergies   Allergen Reactions    Codeine         swelling         Social History:   Social History               Socioeconomic History    Marital status:        Spouse name: Not on file    Number of children: Not on file    Years of education: Not on file    Highest education level: Not on file   Occupational History    Not on file   Tobacco Use    Smoking status: Current Every Day Smoker       Packs/day: 0.50       Years: 35.00       Pack years: 17.50       Types: Cigarettes    Smokeless tobacco: Never Used   Vaping Use    Vaping Use: Never used   Substance and Sexual Activity    Alcohol use: No    Drug use: No       Comment: Cannabis oil 1 month ago    Sexual activity: Yes       Partners: Female   Other Topics Concern    Not on file   Social History Narrative    Not on file      Social Determinants of Health          Financial Resource Strain: Medium Risk    Difficulty of Paying Living Expenses: Somewhat hard   Food Insecurity: No Food Insecurity    Worried About Running Out of Food in the Last Year: Never true    [place] of Food in the Last Year: Never true   Transportation Needs:     Lack of Transportation (Medical): Not on file    Lack of Transportation (Non-Medical):  Not on file   Physical Activity:     Days of Exercise per Week: Not on file    Minutes of Exercise per Session: Not on file   Stress:     Feeling of Stress : Not on file   Social Connections:     Frequency of Communication with Friends and Family: Not on file    Frequency of Social Gatherings with Friends and Family: Not on file    Attends Sabianist Services: Not on file    Active Member of Clubs or Organizations: Not on file    Attends Club or Organization Meetings: Not on file    Marital Status: Not on file   Intimate Partner Violence:     Fear of Current or Ex-Partner: Not on file    Emotionally Abused: Not on file    Physically Abused: Not on file    Sexually Abused: Not on file   Housing Stability:     Unable to Pay for Housing in the Last Year: Not on file    Number of [place] in the Last Year: Not on file    Unstable Housing in the Last Year: Not on file            Family History:    Family History             Problem Relation Age of Onset    No Known Problems Mother      No Known Problems Father      No Known Problems Brother      No Known Problems Maternal Grandmother      No Known Problems Maternal Grandfather      No Known Problems Paternal Grandmother      No Known Problems Paternal Grandfather      No Known Problems Brother              REVIEW OF SYSTEMS:  Constitutional: Negative for fever, chills, diaphoresis, appetite change and fatigue. HENT: Negative for sore throat, trouble swallowing and voice change. Respiratory: Negative for cough, positive for shortness of breath no wheezing. Cardiovascular: Negative for chest pain positive for SOB with one flight of stairs exertion, no pitting LE edema. Gastrointestinal: Negative for nausea, vomiting, abdominal distention, constipation, no diarrhea, blood in stool, anal bleeding or rectal pain. Musculoskeletal: Negative for joint swelling and arthralgias. Skin: Warm and dry, well perfused. Neurological: Negative for seizures, syncope, speech difficulty and weakness. Hematological/Lymphatic: Negative for adenopathy. No history of DVT/PE. Does not bruise/bleed easily. Psychiatric/Behavioral: Negative for agitation.      All others reviewed and negative.        EXAM:  Constitutional: Blood pressure (!) 150/82, pulse 79, temperature 98.6 °F (37 °C), temperature source Oral, resp.  rate 20, height 5' 11\" (1.803 m), weight 185 lb 11.2 oz (84.2 kg), SpO2 97 %, peak flow (!) 18 L/min. No apparent distress, appears stated age and cooperative. Neurologic: follows commands, no focal weakness noted   Lungs: Good respiratory effort.  Clear to auscultation,   CV: Regular rate/ rhythm , no peripheral edema, feet warm and well perfused  GI: Soft, non-tender in all four quadrants, non-distended, + bowel sounds, liver and spleen no palpable masses  : bladder nondistended   MSK: no obvious deformity   Skin: warm, pink and dry   No signs of acute or chronic ischemic bleed extremity     DATA:  CTA findings noted  Right SFA occluded mid thigh followed by reconstitution of popliteal artery and two-vessel runoff     IMPRESSION      Patient Active Problem List   Diagnosis    Lumbar post-laminectomy syndrome    Neck pain    Pain in both upper extremities    Type 2 diabetes mellitus with complication, with long-term current use of insulin (HCC)    Essential hypertension    Hyperlipidemia    Tobacco use    Panlobular emphysema (HCC)    Bipolar affective disorder, currently manic, moderate (HCC)    Anxiety    Gastroesophageal reflux disease    Chronic bilateral low back pain    Hemorrhoids    Chronic allergic rhinitis    Drug dependence (HCC)    Splenic laceration    Constipation    Intra-abdominal hematoma    Trauma    Generalized abdominal pain    Cancer of spleen ([address]    H/O splenectomy    Asplenia after surgical procedure    Acute viral hepatitis B without hepatic coma    Hepatitis C antibody positive in blood    Hepatic encephalopathy (HCC)    Ankle fracture, left, closed, initial encounter    Hyponatremia    Chronic hepatitis B virus infection (HCC)    Chronic hepatitis C without hepatic coma (HCC)    Flank pain    Soft tissue infection    Diabetic polyneuropathy associated with type 2 diabetes mellitus (HCC)    Bipolar disorder, current episode mixed, moderate (HCC)    Chronic pain syndrome    Chronic viral hepatitis B without delta agent and without coma ([address]    Dyslipidemia    Pancreatitis, unspecified pancreatitis type    Hyperglycemia due to diabetes mellitus ([facility]    Attention deficit hyperactivity disorder (ADHD), combined type    Cerebrovascular accident (CVA) ([facility]    Cerebrovascular accident due to occlusion [facility])                  RECOMMENDATIONS:  Discussed the patient and his son the findings and the recommendation  The right SFA occlusion is not likely the cause of the pain in the right groin.   However an DONAVAN has been ordered to see the impact of the right SFA occlusion in terms of the circulation of the foot  Discussed with hospitalist Dr. Franci Burrows  Continue heparin until DONAVAN information is obtained

## 2022-05-21 NOTE — H&P
History and Physical      Name:  Cedrick Presley /Age/Sex: 1945  (68 y.o. male)   MRN & CSN:  [id number] & [id number] Encounter Date/Time: 2022 11:06 PM EDT   Location:  [address] [place] [address] PCP: Zoya Woodard MD       Hospital Day: 1    Assessment and Plan:   Cedrick Presley is a 68 y.o. male who presents with     Right thigh pain for 3 days in setting of peripheral vascular disease  Femoral artery occlusion, however has collateral circulation  DONAVAN index  Neurovascular checks  Vascular surgery consulted by ED provider and [place], will follow up  -Suggestive other etiologies, possible musculoskeletal  Orthopedic consult, consider MRI or further imaging  PT/OT    Other chronic medical history, medications continued unless contra-indicated with above differential diagnosis:    Malignant neoplasm of lower lobe of right lung    Chronic bronchitis, unspecified chronic bronchitis type    Atherosclerosis of aortic arch    Hyperlipidemia, mixed   Chronic obstructive pulmonary disease, unspecified COPD type    Essential hypertension   Hypothyroidism due to acquired atrophy of thyroid   Rectal cancer        D/w ED provider  Code status Full  DVT PPx Heparin drip      History of Present Illness:     Cedrick Presley is a 68 y.o. male p/w right thigh pain that is been going on for 3 days, limiting him from ambulating. Patient does have warm extremities, denies coolness to touch, pain is limited to the right thigh area, is tender to palpation, denies any associate fevers has no other complaints. Review of Systems: Need 10 Elements       Pt otherwise has no complaints of CP, SOB, dizziness, N/V/C/D, abdominal pain, dysuria, joint pains, rash/boils, cough or fevers.        Objective:   No intake or output data in the 24 hours ending 22 2306   Vitals:   Vitals:    220   BP: 116/61   Pulse: 77   Resp: 14   Temp: 97.6 °F (36.4 °C)   TempSrc: Oral       Medications Prior to Admission Prior to Admission medications    Medication Sig Start Date End Date Taking? Authorizing Provider   albuterol sulfate  (90 Base) MCG/ACT inhaler Inhale 2 puffs into the lungs every 6 hours as needed for Wheezing or Shortness of Breath 5/10/22   Ravi Porras MD   atorvastatin (LIPITOR) 10 MG tablet Take 1 tablet by mouth daily 5/10/22   Ravi Porras MD   BREZTRI AEROSPHERE 160-9-4.8 MCG/ACT AERO Inhale 2 puffs into the lungs daily daily 5/10/22   Ravi Porras MD   levothyroxine (SYNTHROID) 75 MCG tablet TAKE 1 TABLET BY MOUTH EVERY DAY 5/10/22   Ravi Porras MD   metoprolol tartrate (LOPRESSOR) 25 MG tablet Take 0.5 tablets by mouth 2 times daily 5/10/22   Ravi Porras MD   nicotine (NICODERM CQ) 21 MG/24HR Place 1 patch onto the skin daily 5/10/22   Ravi Porras MD   hydroCHLOROthiazide (MICROZIDE) 12.5 MG capsule Take 1 capsule by mouth daily as needed (LEG SWELLING) 5/10/22   Ravi Porras MD   Ipratropium-Albuterol (ALBUTEROL-IPRATROPIUM IN) Inhale 2 puffs into the lungs 4 times daily as needed     Historical Provider, MD   OXYGEN Inhale 2 L into the lungs nightly     Historical Provider, MD   Multiple Vitamins-Minerals (PRESERVISION AREDS PO) Take by mouth daily    Historical Provider, MD       Physical Exam: Need 8 Elements   General: NAD   Eyes: EOMI  ENT: neck supple  Cardiovascular: Regular rate. NO edema  Respiratory: Symmetrical expansion, CTA  Gastrointestinal: Soft, non tender  Genitourinary: no suprapubic tenderness  Skin: warm, dry; right thigh pain, skin is warm all the way down to the toes, no overt swelling noted  Neuro: Alert. Oriented  Psych: Mood appropriate.        Past Medical History:   PMHx   Past Medical History:   Diagnosis Date    Atherosclerosis of aortic arch ([facility]     NOTED ON CXR 1/2017- CONSIDER CARDIO CONSULT    COPD (chronic obstructive pulmonary disease) ([facility]     follow with Dr Carlotta Pedro- chantix ineffective    COPD, severe ([facility] 3/14/2022    Essential hypertension 01/29/2018    Full dentures     History of external beam radiation therapy 2009/2019    pelvis/anus in 2009 and right lower lobe lung mass SBRT in 2019 per Dr. Tiffanie Scott [facility]    Hyperlipidemia, mixed     Hypertension     Hypothyroidism due to acquired atrophy of thyroid 05/11/2021    ANTIPEROXIDASE ANTIBODY NEGATIVE    On home oxygen therapy     2l/nc at night and prn    PAD (peripheral artery disease) (HCC)     R leg on doppler 8/2017-- referring to Dr Magnolia Asif Primary lung squamous cell carcinoma, right ([facility] 12/2018    Dr Zeus Trujillo, Dr Cookie Tony, Dr Tiffanie Scott- treated w RT,    Pulmonary nodule, left 07/2018    Rectal cancer [facility]) 2009    Dr Dewayne Pathak oncology, seen by GI at [facility]- Dr Dahlia Madden- tx with chemo and radiation     S/P colonoscopy 02/01/2017    Dr Sonia Kinney at [facility]-  diverticulosis and angioectasis, being referred to surgeon--CONSIDER RECHECK 11 YRS    SCC (squamous cell carcinoma of lung), right [facility])     Dr Zeus Trujillo, periodically Dr Tiffanie Scott.  had resection w Dr Magnolia Asif Subclavian artery stenosis, right [facility])     Wears glasses      PSHX:  has a past surgical history that includes colectomy (2009); angioplasty (Bilateral, 2017); Cataract removal with implant (Left, 2018); Colonoscopy (last one 2017); Tonsillectomy (age 11); Dental surgery; Lung biopsy (12/07/2018); lymph node biopsy (07/2009); and Hemorrhoid surgery (2009). Allergies: No Known Allergies  Fam HX:  family history includes Cancer in his father; Cirrhosis in his mother; Jerre Brown in his father. Soc HX:   Social History     Socioeconomic History    Marital status:       Spouse name: Not on file    Number of children: 10    Years of education: Not on file    Highest education level: Not on file   Occupational History    Occupation: retired   Tobacco Use    Smoking status: Current Every Day Smoker     Packs/day: 1.00     Years: 60.00     Pack years: 60.00 Types: Cigarettes    Smokeless tobacco: Never Used   Vaping Use    Vaping Use: Former   Substance and Sexual Activity    Alcohol use: Yes     Alcohol/week: 20.0 standard drinks     Types: 20 Cans of beer per week     Comment: average\"3 beers per day\" per pt on 12/6/2018    Drug use: No    Sexual activity: Not on file   Other Topics Concern    Not on file   Social History Narrative    6 childrean total ; strong, supportive family. Family does grocery shopping, clean home and prepare meals. Social Determinants of Health     Financial Resource Strain: Low Risk     Difficulty of Paying Living Expenses: Not hard at all   Food Insecurity: No Food Insecurity    Worried About Running Out of Food in the Last Year: Never true    Allen of Food in the Last Year: Never true   Transportation Needs: No Transportation Needs    Lack of Transportation (Medical): No    Lack of Transportation (Non-Medical):  No   Physical Activity: Insufficiently Active    Days of Exercise per Week: 3 days    Minutes of Exercise per Session: 10 min   Stress: No Stress Concern Present    Feeling of Stress : Not at all   Social Connections: Unknown    Frequency of Communication with Friends and Family: Twice a week    Frequency of Social Gatherings with Friends and Family: Once a week    Attends Jain Services: Never    Active Member of Clubs or Organizations: No    Attends Club or Organization Meetings: Never    Marital Status: Not on file   Intimate Partner Violence:     Fear of Current or Ex-Partner: Not on file    Emotionally Abused: Not on file    Physically Abused: Not on file    Sexually Abused: Not on file   Housing Stability: [address] Unable to Pay for Housing in the Last Year: No    Number of Places Lived in the Last Year: 1    Unstable Housing in the Last Year: No       Medications:   Medications:    Infusions:   PRN Meds:     Labs      CBC: No results for input(s): WBC, HGB, PLT in the last 72 hours.  BMP:  No results for input(s): NA, K, CL, CO2, BUN, CREATININE, GLUCOSE in the last 72 hours. Hepatic: No results for input(s): AST, ALT, ALB, BILITOT, ALKPHOS in the last 72 hours. Lipids:   Lab Results   Component Value Date    CHOL 148 05/10/2022    HDL 85 05/10/2022    TRIG 133 05/10/2022     Hemoglobin A1C: No results found for: LABA1C  TSH:   Lab Results   Component Value Date    TSH 3.22 05/10/2022     Troponin:   Lab Results   Component Value Date    TROPONINT 0.020 07/22/2021    TROPONINT 0.028 07/22/2021    TROPONINT 0.023 07/22/2021     Lactic Acid: No results for input(s): LACTA in the last 72 hours. BNP: No results for input(s): PROBNP in the last 72 hours. UA:  Lab Results   Component Value Date    NITRU NEGATIVE 07/22/2021    COLORU YELLOW 07/22/2021    WBCUA 1 07/22/2021    RBCUA <1 07/22/2021    MUCUS RARE 07/22/2021    TRICHOMONAS NONE SEEN 07/22/2021    BACTERIA NEGATIVE 07/22/2021    CLARITYU CLEAR 07/22/2021    SPECGRAV 1.019 07/22/2021    LEUKOCYTESUR NEGATIVE 07/22/2021    UROBILINOGEN NEGATIVE 07/22/2021    BILIRUBINUR NEGATIVE 07/22/2021    BLOODU NEGATIVE 07/22/2021    KETUA NEGATIVE 07/22/2021     Urine Cultures: No results found for: LABURIN  Blood Cultures: No results found for: BC  No results found for: BLOODCULT2  Organism: No results found for: ORG    Imaging/Diagnostics Last 24 Hours   No results found.         Electronically signed by Anju Lehman MD on 5/20/2022 at 11:06 PM

## 2022-05-22 ENCOUNTER — APPOINTMENT (OUTPATIENT)
Dept: CT IMAGING | Age: 77
DRG: 300 | End: 2022-05-22
Attending: INTERNAL MEDICINE
Payer: MEDICARE

## 2022-05-22 ENCOUNTER — APPOINTMENT (OUTPATIENT)
Dept: ULTRASOUND IMAGING | Age: 77
DRG: 300 | End: 2022-05-22
Attending: INTERNAL MEDICINE
Payer: MEDICARE

## 2022-05-22 LAB
ANION GAP SERPL CALCULATED.3IONS-SCNC: 13 MMOL/L (ref 4–16)
APTT: 32.6 SECONDS (ref 25.1–37.1)
APTT: 33.5 SECONDS (ref 25.1–37.1)
APTT: 46.6 SECONDS (ref 25.1–37.1)
APTT: 54.3 SECONDS (ref 25.1–37.1)
BACTERIA: NEGATIVE /HPF
BASOPHILS ABSOLUTE: 0 K/CU MM
BASOPHILS RELATIVE PERCENT: 0.2 % (ref 0–1)
BILIRUBIN URINE: NEGATIVE MG/DL
BLOOD, URINE: NEGATIVE
BUN BLDV-MCNC: 10 MG/DL (ref 6–23)
CALCIUM SERPL-MCNC: 7.7 MG/DL (ref 8.3–10.6)
CHLORIDE BLD-SCNC: 98 MMOL/L (ref 99–110)
CLARITY: CLEAR
CO2: 25 MMOL/L (ref 21–32)
COLOR: YELLOW
CREAT SERPL-MCNC: 0.6 MG/DL (ref 0.9–1.3)
DIFFERENTIAL TYPE: ABNORMAL
EOSINOPHILS ABSOLUTE: 0 K/CU MM
EOSINOPHILS RELATIVE PERCENT: 0.3 % (ref 0–3)
FOLATE: 14.4 NG/ML (ref 3.1–17.5)
GFR AFRICAN AMERICAN: >60 ML/MIN/1.73M2
GFR NON-AFRICAN AMERICAN: >60 ML/MIN/1.73M2
GLUCOSE BLD-MCNC: 98 MG/DL (ref 70–99)
GLUCOSE, URINE: NEGATIVE MG/DL
HCT VFR BLD CALC: 37 % (ref 42–52)
HEMOGLOBIN: 11.7 GM/DL (ref 13.5–18)
HIGH SENSITIVE C-REACTIVE PROTEIN: 66.7 MG/L
HYALINE CASTS: 0 /LPF
IMMATURE NEUTROPHIL %: 0.5 % (ref 0–0.43)
KETONES, URINE: 15 MG/DL
LEUKOCYTE ESTERASE, URINE: NEGATIVE
LYMPHOCYTES ABSOLUTE: 0.4 K/CU MM
LYMPHOCYTES RELATIVE PERCENT: 6.4 % (ref 24–44)
MCH RBC QN AUTO: 32.5 PG (ref 27–31)
MCHC RBC AUTO-ENTMCNC: 31.6 % (ref 32–36)
MCV RBC AUTO: 102.8 FL (ref 78–100)
MONOCYTES ABSOLUTE: 0.6 K/CU MM
MONOCYTES RELATIVE PERCENT: 9.6 % (ref 0–4)
MUCUS: ABNORMAL HPF
NITRITE URINE, QUANTITATIVE: NEGATIVE
NUCLEATED RBC %: 0 %
PDW BLD-RTO: 13.2 % (ref 11.7–14.9)
PH, URINE: 6 (ref 5–8)
PLATELET # BLD: 327 K/CU MM (ref 140–440)
PMV BLD AUTO: 10.1 FL (ref 7.5–11.1)
POTASSIUM SERPL-SCNC: 3.9 MMOL/L (ref 3.5–5.1)
PROCALCITONIN: 0.07
PROTEIN UA: NEGATIVE MG/DL
RBC # BLD: 3.6 M/CU MM (ref 4.6–6.2)
RBC URINE: 1 /HPF (ref 0–3)
SEGMENTED NEUTROPHILS ABSOLUTE COUNT: 5.4 K/CU MM
SEGMENTED NEUTROPHILS RELATIVE PERCENT: 83 % (ref 36–66)
SODIUM BLD-SCNC: 136 MMOL/L (ref 135–145)
SPECIFIC GRAVITY UA: 1.01 (ref 1–1.03)
SQUAMOUS EPITHELIAL: <1 /HPF
TOTAL CK: 30 IU/L (ref 38–174)
TOTAL IMMATURE NEUTOROPHIL: 0.03 K/CU MM
TOTAL NUCLEATED RBC: 0 K/CU MM
TRICHOMONAS: ABNORMAL /HPF
TROPONIN T: <0.01 NG/ML
UROBILINOGEN, URINE: 2 MG/DL (ref 0.2–1)
VITAMIN B-12: 288.2 PG/ML (ref 211–911)
WBC # BLD: 6.4 K/CU MM (ref 4–10.5)
WBC UA: 4 /HPF (ref 0–2)

## 2022-05-22 PROCEDURE — 6360000002 HC RX W HCPCS: Performed by: INTERNAL MEDICINE

## 2022-05-22 PROCEDURE — 94761 N-INVAS EAR/PLS OXIMETRY MLT: CPT

## 2022-05-22 PROCEDURE — 85025 COMPLETE CBC W/AUTO DIFF WBC: CPT

## 2022-05-22 PROCEDURE — 84145 PROCALCITONIN (PCT): CPT

## 2022-05-22 PROCEDURE — 2580000003 HC RX 258: Performed by: INTERNAL MEDICINE

## 2022-05-22 PROCEDURE — 36415 COLL VENOUS BLD VENIPUNCTURE: CPT

## 2022-05-22 PROCEDURE — 82607 VITAMIN B-12: CPT

## 2022-05-22 PROCEDURE — 84484 ASSAY OF TROPONIN QUANT: CPT

## 2022-05-22 PROCEDURE — 99222 1ST HOSP IP/OBS MODERATE 55: CPT | Performed by: ORTHOPAEDIC SURGERY

## 2022-05-22 PROCEDURE — 86141 C-REACTIVE PROTEIN HS: CPT

## 2022-05-22 PROCEDURE — 71275 CT ANGIOGRAPHY CHEST: CPT

## 2022-05-22 PROCEDURE — 6360000004 HC RX CONTRAST MEDICATION: Performed by: INTERNAL MEDICINE

## 2022-05-22 PROCEDURE — 85730 THROMBOPLASTIN TIME PARTIAL: CPT

## 2022-05-22 PROCEDURE — 94640 AIRWAY INHALATION TREATMENT: CPT

## 2022-05-22 PROCEDURE — 82746 ASSAY OF FOLIC ACID SERUM: CPT

## 2022-05-22 PROCEDURE — 81001 URINALYSIS AUTO W/SCOPE: CPT

## 2022-05-22 PROCEDURE — 6370000000 HC RX 637 (ALT 250 FOR IP): Performed by: INTERNAL MEDICINE

## 2022-05-22 PROCEDURE — 93971 EXTREMITY STUDY: CPT

## 2022-05-22 PROCEDURE — 82550 ASSAY OF CK (CPK): CPT

## 2022-05-22 PROCEDURE — 80048 BASIC METABOLIC PNL TOTAL CA: CPT

## 2022-05-22 PROCEDURE — 2700000000 HC OXYGEN THERAPY PER DAY

## 2022-05-22 PROCEDURE — 2140000000 HC CCU INTERMEDIATE R&B

## 2022-05-22 RX ORDER — GUAIFENESIN 600 MG/1
600 TABLET, EXTENDED RELEASE ORAL 2 TIMES DAILY
Status: DISCONTINUED | OUTPATIENT
Start: 2022-05-22 | End: 2022-05-24 | Stop reason: HOSPADM

## 2022-05-22 RX ORDER — GUAIFENESIN 600 MG/1
600 TABLET, EXTENDED RELEASE ORAL 2 TIMES DAILY
Status: DISCONTINUED | OUTPATIENT
Start: 2022-05-22 | End: 2022-05-22

## 2022-05-22 RX ORDER — SODIUM CHLORIDE 9 MG/ML
INJECTION, SOLUTION INTRAVENOUS CONTINUOUS
Status: DISCONTINUED | OUTPATIENT
Start: 2022-05-22 | End: 2022-05-24

## 2022-05-22 RX ORDER — ENOXAPARIN SODIUM 100 MG/ML
1 INJECTION SUBCUTANEOUS 2 TIMES DAILY
Status: DISCONTINUED | OUTPATIENT
Start: 2022-05-22 | End: 2022-05-23

## 2022-05-22 RX ORDER — 0.9 % SODIUM CHLORIDE 0.9 %
500 INTRAVENOUS SOLUTION INTRAVENOUS ONCE
Status: COMPLETED | OUTPATIENT
Start: 2022-05-22 | End: 2022-05-22

## 2022-05-22 RX ORDER — DOXYCYCLINE HYCLATE 100 MG
100 TABLET ORAL EVERY 12 HOURS SCHEDULED
Status: DISCONTINUED | OUTPATIENT
Start: 2022-05-22 | End: 2022-05-24 | Stop reason: HOSPADM

## 2022-05-22 RX ADMIN — Medication 2 PUFF: at 14:45

## 2022-05-22 RX ADMIN — ALBUTEROL SULFATE 2 PUFF: 90 AEROSOL, METERED RESPIRATORY (INHALATION) at 07:50

## 2022-05-22 RX ADMIN — DOXYCYCLINE HYCLATE 100 MG: 100 TABLET, COATED ORAL at 20:05

## 2022-05-22 RX ADMIN — SODIUM CHLORIDE: 9 INJECTION, SOLUTION INTRAVENOUS at 13:44

## 2022-05-22 RX ADMIN — LEVOTHYROXINE SODIUM 75 MCG: 0.07 TABLET ORAL at 05:19

## 2022-05-22 RX ADMIN — ENOXAPARIN SODIUM 50 MG: 100 INJECTION SUBCUTANEOUS at 20:05

## 2022-05-22 RX ADMIN — IOPAMIDOL 75 ML: 755 INJECTION, SOLUTION INTRAVENOUS at 14:15

## 2022-05-22 RX ADMIN — ALBUTEROL SULFATE 2 PUFF: 90 AEROSOL, METERED RESPIRATORY (INHALATION) at 14:44

## 2022-05-22 RX ADMIN — BUDESONIDE AND FORMOTEROL FUMARATE DIHYDRATE 2 PUFF: 160; 4.5 AEROSOL RESPIRATORY (INHALATION) at 20:21

## 2022-05-22 RX ADMIN — SODIUM CHLORIDE 500 ML: 9 INJECTION, SOLUTION INTRAVENOUS at 10:29

## 2022-05-22 RX ADMIN — SODIUM CHLORIDE, PRESERVATIVE FREE 10 ML: 5 INJECTION INTRAVENOUS at 10:04

## 2022-05-22 RX ADMIN — Medication 2 PUFF: at 07:50

## 2022-05-22 RX ADMIN — ATORVASTATIN CALCIUM 10 MG: 10 TABLET, FILM COATED ORAL at 09:05

## 2022-05-22 RX ADMIN — Medication 2 PUFF: at 20:21

## 2022-05-22 RX ADMIN — GUAIFENESIN 600 MG: 600 TABLET, EXTENDED RELEASE ORAL at 18:40

## 2022-05-22 RX ADMIN — SODIUM CHLORIDE, PRESERVATIVE FREE 10 ML: 5 INJECTION INTRAVENOUS at 20:04

## 2022-05-22 RX ADMIN — ENOXAPARIN SODIUM 50 MG: 100 INJECTION SUBCUTANEOUS at 14:30

## 2022-05-22 RX ADMIN — BUDESONIDE AND FORMOTEROL FUMARATE DIHYDRATE 2 PUFF: 160; 4.5 AEROSOL RESPIRATORY (INHALATION) at 07:51

## 2022-05-22 RX ADMIN — ASPIRIN 81 MG 81 MG: 81 TABLET ORAL at 09:05

## 2022-05-22 RX ADMIN — ALBUTEROL SULFATE 2 PUFF: 90 AEROSOL, METERED RESPIRATORY (INHALATION) at 20:21

## 2022-05-22 NOTE — CONSULTS
Orthopaedic Consult    Patient Name: Enrique Corona   ([birth year])  MRN   [id number]   Today's date:  5/22/2022     CHIEF COMPLAINT: Right thigh pain    HISTORY OF PRESENT ILLNESS:      The patient is a 68 y.o. male  who presents with right thigh pain with a vascular issue and occlusion in the right thigh and groin region. He denies any falls or injuries. His pain was severe couple days ago but has improved since being on anticoagulation here in the hospital.  He denies any pain or concerns at this time. He has been able to move his hip and knee well. Pain was most significant along the groin and soft tissues of the upper inner thigh but today denies any such concerns.       Past Medical History         Diagnosis Date    Atherosclerosis of aortic arch ([address]     NOTED ON CXR 1/2017- CONSIDER CARDIO CONSULT    COPD (chronic obstructive pulmonary disease) ([address]     follow with Dr Raul Elias- chantix ineffective    COPD, severe ([address] 3/14/2022    Essential hypertension 01/29/2018    Full dentures     History of external beam radiation therapy 2009/2019    pelvis/anus in 2009 and right lower lobe lung mass SBRT in 2019 per Dr. Ramey Or at [facility] Hyperlipidemia, mixed     Hypertension     Hypothyroidism due to acquired atrophy of thyroid 05/11/2021    ANTIPEROXIDASE ANTIBODY NEGATIVE    On home oxygen therapy     2l/nc at night and prn    PAD (peripheral artery disease) ([address]     R leg on doppler 8/2017-- referring to Dr Charmaine Fitzpatrick Primary lung squamous cell carcinoma, right ([address] 12/2018    Dr Mira Gibson, Dr Raul Elias, Dr Ramey Or- treated w RT,    Pulmonary nodule, left 07/2018    Rectal cancer [facility]) 2009    Dr Golden Minor oncology, [facility] by GI at [facility]- Dr Alisson Sapp- tx with chemo and radiation     S/P colonoscopy 02/01/2017    Dr Marlow Sites at [facility]-  diverticulosis and angioectasis, being referred to surgeon--CONSIDER RECHECK 5 YRS    SCC (squamous cell carcinoma of lung), right [facility])     Dr Shikha Milner, periodically Dr Martinez Bone.  had resection w Dr Cindy Santos Subclavian artery stenosis, right [facility])    [facility] Wears glasses        Past Surgical History         Procedure Laterality Date    ANGIOPLASTY Bilateral 2017    Dr Skip Chiang for bilat leg PVD   Roderick Stalker Left 2018    Dr Irene Dominguez  2009    Dr Namrata Sprague then needed revision at [facility] one 2017   [address]  2009    LUNG BIOPSY  12/07/2018    LYMPH NODE BIOPSY  07/2009    right axilla    TONSILLECTOMY  age 5       Medications Prior to Admission:     Prior to Admission medications    Medication Sig Start Date End Date Taking?  Authorizing Provider   albuterol sulfate  (90 Base) MCG/ACT inhaler Inhale 2 puffs into the lungs every 6 hours as needed for Wheezing or Shortness of Breath 5/10/22   Felizardo Boas, MD   atorvastatin (LIPITOR) 10 MG tablet Take 1 tablet by mouth daily 5/10/22   Felizardo Boas, MD   BREZTRI AEROSPHERE 160-9-4.8 MCG/ACT AERO Inhale 2 puffs into the lungs daily daily 5/10/22   Felizardo Boas, MD   levothyroxine (SYNTHROID) 75 MCG tablet TAKE 1 TABLET BY MOUTH EVERY DAY 5/10/22   Felizardo Boas, MD   metoprolol tartrate (LOPRESSOR) 25 MG tablet Take 0.5 tablets by mouth 2 times daily 5/10/22   Felizardo Boas, MD   nicotine (NICODERM CQ) 21 MG/24HR Place 1 patch onto the skin daily 5/10/22   Felizardo Boas, MD   hydroCHLOROthiazide (MICROZIDE) 12.5 MG capsule Take 1 capsule by mouth daily as needed (LEG SWELLING) 5/10/22   Felizardo Boas, MD   Ipratropium-Albuterol (ALBUTEROL-IPRATROPIUM IN) Inhale 2 puffs into the lungs 4 times daily as needed     Historical Provider, MD   OXYGEN Inhale 2 L into the lungs nightly     Historical Provider, MD   Multiple Vitamins-Minerals (PRESERVISION AREDS PO) Take by mouth daily    Historical Provider, MD       Allergies     Patient has no known allergies. Social History   TOBACCO:   reports that he has been smoking cigarettes. He has a 60.00 pack-year smoking history. He has never used smokeless tobacco.  ETOH:   reports current alcohol use of about 20.0 standard drinks of alcohol per week. Family History         Problem Relation Age of Onset    Cirrhosis Mother     Lung Cancer Father     Cancer Father         brain cancer       Review of Systems   As in admission H&P, reviewed. Musculoskeltal as in HPI     Physical Exam:    Vitals: BP 93/62   Pulse 81   Temp 97.8 °F (36.6 °C) (Oral)   Resp 30   Ht 5' 7.01\" (1.702 m)   Wt 120 lb (54.4 kg)   SpO2 97%   BMI 18.79 kg/m² ,  Body mass index is 18.79 kg/m². General appearance: alert, appears stated age and cooperative  Skin: Skin color normal. No rashes or lesions  HEENT: Head: Normocephalic, no lesions, without obvious abnormality. Neck: supple, symmetrical, trachea midline    Extremities:      Right lower extremity:  No tenderness to palpation at the hip thigh or knee. No pain with active or passive range of motion of the hip and knee with full range of motion and no instability or pain present. Skin is intact with no open wounds or lesions. There is warmth and good color to the lower extremity. Strength is 5 out of 5 with hip flexion, abduction, and knee flexion and extension    Neurologic: Mental status: Alert, oriented, thought content appropriate    Labs     CBC:   Recent Labs     05/21/22  0203   WBC 5.6   HGB 11.2*        BMP:  No results for input(s): NA, K, CL, CO2, BUN, CREATININE, GLUCOSE in the last 72 hours. INR: No results for input(s): INR in the last 72 hours. X-ray view   Imaging Review  No orthopedic imaging has been ordered for my review. Assessment and Plan     1. Right thigh pain, improved with anticoagulation    He feels improved today. He attributes his symptoms to his vascular issues. He denies any orthopedic concerns at this time.     No intervention or work-up needed for bone or joint issues in the lower extremity.     Follow-up as needed        Baby MD Urbano

## 2022-05-22 NOTE — PROGRESS NOTES
V2.0  [facility]      Name:  Satnam Mehta /Age/Sex: 1945  (68 y.o. male)   MRN & CSN:  [id number] & [id number] Encounter Date/Time: 2022 2:37 PM EDT    Location:  [facility]/0007- PCP: Mirtha Hall MD       Hospital Day: 3    Assessment and Plan:   Satnam Mehta is a 68 y.o. male with pmh of lung cancer, rectal cancer, COPD, chronic hypoxic respiratory failure on 2 L nocturnal oxygen, who presents with right groin pain . Patient with history of PVD (peripheral vascular disease) ([address]      Plan:  Satnam Mehta is a 68 y.o. male who presents with right groin/thigh pain     Right thigh /groin pain:For 3 days in setting of peripheral vascular disease  CT angio done at [facility] showed superficial femoral artery occlusion,however has collateral circulation  DONAVAN index ordered, however unable to get done inpatient  Discussed with CTS, states patient has no ischemia of leg and can follow-up with them as outpatient to get the DONAVAN done. Recommends just baby aspirin at discharge. Seen by Ortho did not think is Ortho related   Doppler of right lower extremity done and showed questionable minimal nonocclusive acute common femoral vein DVT and mod non occlusive acute popliteal vein DVT. Patient started on Lovenox therapeutic dose. He was on heparin drip from admission to this morning. Checking CT chest rule out PE  Hematology oncology consult for anticoagulation recommendation and duration. PT /OT eval.    Hypotension  BP down to 80 over 40s this morning. Home metoprolol on hold and he has HCTZ ordered by his PCP for leg swelling but he said he has not started it. Has not been given here too. Responded to 500 cc fluid bolus and BP now stable. Labs unremarkable with normal renal function, WBC normal and procalcitonin low. UA low.   Gentle hydration overnight given he has received contrast.    Malignant neoplasm of lower lobe of right lung -follows with Dr. Annita Vera to be in remission. Rectal cancer : Needs follow-up colonoscopy. Chronic bronchitis/copd : Continue as needed nebs. Hyperlipidemia, mixed : Continue statin  Chronic hypoxic respiratory failure: On 2 L continuous. Same here  Hypothyroidism due to acquired atrophy of thyroid : Continue Synthroid 75 mcg daily. Check TSH. Anemia: Mild. Monitor      Diet ADULT DIET; Regular   DVT Prophylaxis [x] Lovenox, []  Heparin, [] SCDs, [] Ambulation,  [] Eliquis, [] Xarelto  [] Coumadin   Code Status Full Code   Disposition From: Home  Expected Disposition: Home  Estimated Date of Discharge: 1 day. Patient requires continued admission due to work up in progress. Surrogate Decision Maker/ BENNY Washington     Subjective:     Chief Complaint: No chief complaint on file. Arias Conway is a 68 y.o. male who presents with right thigh/groin pain of 3 days duration limiting ambulation. History of PAD with femoral artery occlusion. Seen and evaluated at bedside. Son and daughter-in-law present. States pain is better than yesterday. Noted BP low but patient asymptomatic. No new complaints     Review of Systems:    Review of Systems  Right groin and thigh pain    Objective: Intake/Output Summary (Last 24 hours) at 5/22/2022 1344  Last data filed at 5/22/2022 1220  Gross per 24 hour   Intake 347.84 ml   Output 725 ml   Net -377.16 ml        Vitals:   Vitals:    05/22/22 1300   BP: (!) 117/43   Pulse: 82   Resp: 22   Temp:    SpO2: 94%       Physical Exam:     General: NAD. O2 nasal cannula 2 L, looks underweight  Eyes: EOMI  ENT: neck supple  Cardiovascular: Regular rate. Respiratory: Diminished bilaterally, no wheeze or crackles  Gastrointestinal: Soft, non tender, no palpable masses bowel sounds present  Genitourinary: no suprapubic tenderness. Right groin and thigh tender to touch but better than yesterday. Musculoskeletal: No edema. Lateral lower extremities warm  Skin: warm, dry  Neuro: Alert.   Psych: Mood appropriate.      Medications:   Medications:    enoxaparin  1 mg/kg SubCUTAneous BID    atorvastatin  10 mg Oral Daily    levothyroxine  75 mcg Oral Daily    [Held by provider] metoprolol tartrate  12.5 mg Oral BID    nicotine  1 patch TransDERmal Daily    sodium chloride flush  10 mL IntraVENous 2 times per day    budesonide-formoterol  2 puff Inhalation BID    albuterol sulfate HFA  2 puff Inhalation Q4H WA    ipratropium  2 puff Inhalation Q4H WA    aspirin  81 mg Oral Daily      Infusions:    sodium chloride      sodium chloride       PRN Meds: albuterol sulfate HFA, 2 puff, Q6H PRN  [Held by provider] hydroCHLOROthiazide, 12.5 mg, Daily PRN  sodium chloride flush, 10 mL, PRN  sodium chloride, , PRN  ondansetron, 4 mg, Q8H PRN   Or  ondansetron, 4 mg, Q6H PRN  bisacodyl, 5 mg, Daily PRN  acetaminophen, 650 mg, Q6H PRN   Or  acetaminophen, 650 mg, Q6H PRN      Labs      Recent Results (from the past 24 hour(s))   APTT    Collection Time: 05/21/22  2:15 PM   Result Value Ref Range    aPTT 47.3 (H) 25.1 - 37.1 SECONDS   APTT    Collection Time: 05/21/22  9:48 PM   Result Value Ref Range    aPTT 64.7 (H) 25.1 - 37.1 SECONDS   APTT    Collection Time: 05/22/22  3:21 AM   Result Value Ref Range    aPTT 54.3 (H) 25.1 - 37.1 SECONDS   Basic Metabolic Panel    Collection Time: 05/22/22 10:04 AM   Result Value Ref Range    Sodium 136 135 - 145 MMOL/L    Potassium 3.9 3.5 - 5.1 MMOL/L    Chloride 98 (L) 99 - 110 mMol/L    CO2 25 21 - 32 MMOL/L    Anion Gap 13 4 - 16    BUN 10 6 - 23 MG/DL    CREATININE 0.6 (L) 0.9 - 1.3 MG/DL    Glucose 98 70 - 99 MG/DL    Calcium 7.7 (L) 8.3 - 10.6 MG/DL    GFR Non-African American >60 >60 mL/min/1.73m2    GFR African American >60 >60 mL/min/1.73m2   C-Reactive Protein    Collection Time: 05/22/22 10:04 AM   Result Value Ref Range    CRP, High Sensitivity 66.7 mg/L   CBC with Auto Differential    Collection Time: 05/22/22 10:04 AM   Result Value Ref Range    WBC 6.4 4.0 - 10.5 K/CU MM    RBC 3.60 (L) 4.6 - 6.2 M/CU MM    Hemoglobin 11.7 (L) 13.5 - 18.0 GM/DL    Hematocrit 37.0 (L) 42 - 52 %    .8 (H) 78 - 100 FL    MCH 32.5 (H) 27 - 31 PG    MCHC 31.6 (L) 32.0 - 36.0 %    RDW 13.2 11.7 - 14.9 %    Platelets 773 766 - 779 K/CU MM    MPV 10.1 7.5 - 11.1 FL    Differential Type AUTOMATED DIFFERENTIAL     Segs Relative 83.0 (H) 36 - 66 %    Lymphocytes % 6.4 (L) 24 - 44 %    Monocytes % 9.6 (H) 0 - 4 %    Eosinophils % 0.3 0 - 3 %    Basophils % 0.2 0 - 1 %    Segs Absolute 5.4 K/CU MM    Lymphocytes Absolute 0.4 K/CU MM    Monocytes Absolute 0.6 K/CU MM    Eosinophils Absolute 0.0 K/CU MM    Basophils Absolute 0.0 K/CU MM    Nucleated RBC % 0.0 %    Total Nucleated RBC 0.0 K/CU MM    Total Immature Neutrophil 0.03 K/CU MM    Immature Neutrophil % 0.5 (H) 0 - 0.43 %   Procalcitonin    Collection Time: 05/22/22 10:04 AM   Result Value Ref Range    Procalcitonin 0.070    Troponin    Collection Time: 05/22/22 10:04 AM   Result Value Ref Range    Troponin T <0.010 <0.01 NG/ML   CK    Collection Time: 05/22/22 10:04 AM   Result Value Ref Range    Total CK 30 (L) 38 - 174 IU/L   APTT    Collection Time: 05/22/22 10:11 AM   Result Value Ref Range    aPTT 46.6 (H) 25.1 - 37.1 SECONDS   Urinalysis with Reflex to Culture    Collection Time: 05/22/22 12:35 PM    Specimen: Urine   Result Value Ref Range    Color, UA YELLOW YELLOW    Clarity, UA CLEAR CLEAR    Glucose, Urine NEGATIVE NEGATIVE MG/DL    Bilirubin Urine NEGATIVE NEGATIVE MG/DL    Ketones, Urine 15 (A) NEGATIVE MG/DL    Specific Gravity, UA 1.015 1.001 - 1.035    Blood, Urine NEGATIVE NEGATIVE    pH, Urine 6.0 5.0 - 8.0    Protein, UA NEGATIVE NEGATIVE MG/DL    Urobilinogen, Urine 2.0 (H) 0.2 - 1.0 MG/DL    Nitrite Urine, Quantitative NEGATIVE NEGATIVE    Leukocyte Esterase, Urine NEGATIVE NEGATIVE    RBC, UA 1 0 - 3 /HPF    WBC, UA 4 (H) 0 - 2 /HPF    Bacteria, UA NEGATIVE NEGATIVE /HPF    Squam Epithel, UA <1 /HPF    Mucus, UA FEW (A) NEGATIVE HPF    Trichomonas, UA NONE SEEN NONE SEEN /HPF    Hyaline Casts, UA 0 /LPF        Imaging/Diagnostics Last 24 Hours     Venous Doppler right lower extremity  Impression   1. Questionable minimal nonocclusive acute common femoral vein DVT. 2. Moderate nonocclusive acute popliteal vein DVT. Exam results were called by Dr. Maribeth Almonte.  Gretchen Munoz MD to Dr. Preet Pappas on   5/22/2022 at 13:16.             Electronically signed by Abraham Fletcher MD on 5/22/2022 at 1:44 PM

## 2022-05-22 NOTE — PROGRESS NOTES
CTA chest result noted-no PE. Severe COPD with mucous plugging  Bronchopneumonia. proCalcitonin low. Will add Doxy and consult pulmonary.     Raydell Goldberg, MD

## 2022-05-22 NOTE — PROGRESS NOTES
Physical Therapy  Order received. Will await further ortho/DVT/PE workup prior to evaluating pt. Will continue to follow and evaluate when pt medically appropriate.

## 2022-05-23 LAB
ANION GAP SERPL CALCULATED.3IONS-SCNC: 11 MMOL/L (ref 4–16)
APTT: 29.5 SECONDS (ref 25.1–37.1)
APTT: 31.1 SECONDS (ref 25.1–37.1)
APTT: 33.3 SECONDS (ref 25.1–37.1)
APTT: 34 SECONDS (ref 25.1–37.1)
BASOPHILS ABSOLUTE: 0 K/CU MM
BASOPHILS RELATIVE PERCENT: 0.4 % (ref 0–1)
BUN BLDV-MCNC: 6 MG/DL (ref 6–23)
CALCIUM SERPL-MCNC: 8.1 MG/DL (ref 8.3–10.6)
CHLORIDE BLD-SCNC: 103 MMOL/L (ref 99–110)
CO2: 26 MMOL/L (ref 21–32)
CREAT SERPL-MCNC: 0.6 MG/DL (ref 0.9–1.3)
DIFFERENTIAL TYPE: ABNORMAL
EOSINOPHILS ABSOLUTE: 0 K/CU MM
EOSINOPHILS RELATIVE PERCENT: 0.6 % (ref 0–3)
GFR AFRICAN AMERICAN: >60 ML/MIN/1.73M2
GFR NON-AFRICAN AMERICAN: >60 ML/MIN/1.73M2
GLUCOSE BLD-MCNC: 95 MG/DL (ref 70–99)
HCT VFR BLD CALC: 36.2 % (ref 42–52)
HEMOGLOBIN: 11.4 GM/DL (ref 13.5–18)
IMMATURE NEUTROPHIL %: 0.8 % (ref 0–0.43)
LYMPHOCYTES ABSOLUTE: 0.6 K/CU MM
LYMPHOCYTES RELATIVE PERCENT: 10.8 % (ref 24–44)
MCH RBC QN AUTO: 32.7 PG (ref 27–31)
MCHC RBC AUTO-ENTMCNC: 31.5 % (ref 32–36)
MCV RBC AUTO: 103.7 FL (ref 78–100)
MONOCYTES ABSOLUTE: 0.6 K/CU MM
MONOCYTES RELATIVE PERCENT: 12 % (ref 0–4)
NUCLEATED RBC %: 0 %
PDW BLD-RTO: 13.3 % (ref 11.7–14.9)
PLATELET # BLD: 283 K/CU MM (ref 140–440)
PMV BLD AUTO: 8.9 FL (ref 7.5–11.1)
POTASSIUM SERPL-SCNC: 4.1 MMOL/L (ref 3.5–5.1)
RBC # BLD: 3.49 M/CU MM (ref 4.6–6.2)
SEGMENTED NEUTROPHILS ABSOLUTE COUNT: 3.8 K/CU MM
SEGMENTED NEUTROPHILS RELATIVE PERCENT: 75.4 % (ref 36–66)
SODIUM BLD-SCNC: 140 MMOL/L (ref 135–145)
TOTAL IMMATURE NEUTOROPHIL: 0.04 K/CU MM
TOTAL NUCLEATED RBC: 0 K/CU MM
WBC # BLD: 5.1 K/CU MM (ref 4–10.5)

## 2022-05-23 PROCEDURE — 85730 THROMBOPLASTIN TIME PARTIAL: CPT

## 2022-05-23 PROCEDURE — 99233 SBSQ HOSP IP/OBS HIGH 50: CPT | Performed by: INTERNAL MEDICINE

## 2022-05-23 PROCEDURE — 6370000000 HC RX 637 (ALT 250 FOR IP): Performed by: INTERNAL MEDICINE

## 2022-05-23 PROCEDURE — 2060000000 HC ICU INTERMEDIATE R&B

## 2022-05-23 PROCEDURE — 85025 COMPLETE CBC W/AUTO DIFF WBC: CPT

## 2022-05-23 PROCEDURE — 85027 COMPLETE CBC AUTOMATED: CPT

## 2022-05-23 PROCEDURE — 97530 THERAPEUTIC ACTIVITIES: CPT

## 2022-05-23 PROCEDURE — 97162 PT EVAL MOD COMPLEX 30 MIN: CPT

## 2022-05-23 PROCEDURE — 2700000000 HC OXYGEN THERAPY PER DAY

## 2022-05-23 PROCEDURE — 36415 COLL VENOUS BLD VENIPUNCTURE: CPT

## 2022-05-23 PROCEDURE — 2580000003 HC RX 258: Performed by: INTERNAL MEDICINE

## 2022-05-23 PROCEDURE — 94640 AIRWAY INHALATION TREATMENT: CPT

## 2022-05-23 PROCEDURE — 80048 BASIC METABOLIC PNL TOTAL CA: CPT

## 2022-05-23 PROCEDURE — 97116 GAIT TRAINING THERAPY: CPT

## 2022-05-23 PROCEDURE — 94761 N-INVAS EAR/PLS OXIMETRY MLT: CPT

## 2022-05-23 PROCEDURE — 97166 OT EVAL MOD COMPLEX 45 MIN: CPT

## 2022-05-23 RX ADMIN — ATORVASTATIN CALCIUM 10 MG: 10 TABLET, FILM COATED ORAL at 10:03

## 2022-05-23 RX ADMIN — ASPIRIN 81 MG 81 MG: 81 TABLET ORAL at 10:02

## 2022-05-23 RX ADMIN — ALBUTEROL SULFATE 2 PUFF: 90 AEROSOL, METERED RESPIRATORY (INHALATION) at 07:35

## 2022-05-23 RX ADMIN — GUAIFENESIN 600 MG: 600 TABLET, EXTENDED RELEASE ORAL at 21:37

## 2022-05-23 RX ADMIN — Medication 2 PUFF: at 19:15

## 2022-05-23 RX ADMIN — SODIUM CHLORIDE: 9 INJECTION, SOLUTION INTRAVENOUS at 02:31

## 2022-05-23 RX ADMIN — DOXYCYCLINE HYCLATE 100 MG: 100 TABLET, COATED ORAL at 21:37

## 2022-05-23 RX ADMIN — Medication 2 PUFF: at 11:12

## 2022-05-23 RX ADMIN — BUDESONIDE AND FORMOTEROL FUMARATE DIHYDRATE 2 PUFF: 160; 4.5 AEROSOL RESPIRATORY (INHALATION) at 19:15

## 2022-05-23 RX ADMIN — ALBUTEROL SULFATE 2 PUFF: 90 AEROSOL, METERED RESPIRATORY (INHALATION) at 11:12

## 2022-05-23 RX ADMIN — Medication 2 PUFF: at 07:35

## 2022-05-23 RX ADMIN — ALBUTEROL SULFATE 2 PUFF: 90 AEROSOL, METERED RESPIRATORY (INHALATION) at 16:15

## 2022-05-23 RX ADMIN — APIXABAN 10 MG: 5 TABLET, FILM COATED ORAL at 21:37

## 2022-05-23 RX ADMIN — LEVOTHYROXINE SODIUM 75 MCG: 0.07 TABLET ORAL at 10:02

## 2022-05-23 RX ADMIN — SODIUM CHLORIDE, PRESERVATIVE FREE 10 ML: 5 INJECTION INTRAVENOUS at 10:04

## 2022-05-23 RX ADMIN — Medication 2 PUFF: at 16:16

## 2022-05-23 RX ADMIN — DOXYCYCLINE HYCLATE 100 MG: 100 TABLET, COATED ORAL at 10:02

## 2022-05-23 RX ADMIN — ALBUTEROL SULFATE 2 PUFF: 90 AEROSOL, METERED RESPIRATORY (INHALATION) at 19:15

## 2022-05-23 RX ADMIN — BUDESONIDE AND FORMOTEROL FUMARATE DIHYDRATE 2 PUFF: 160; 4.5 AEROSOL RESPIRATORY (INHALATION) at 07:35

## 2022-05-23 RX ADMIN — APIXABAN 10 MG: 5 TABLET, FILM COATED ORAL at 10:02

## 2022-05-23 RX ADMIN — GUAIFENESIN 600 MG: 600 TABLET, EXTENDED RELEASE ORAL at 10:03

## 2022-05-23 NOTE — CARE COORDINATION
.CM met with pt for d/c planning. Introduced self and updated white board. Pt lives alone in a one level home. He states that his son lives there part time. Pt's family provides his transportation. He has a PCP, has insurance, and is able to afford his medication. Pt uses 2L O2 at hs. He has grab bars and a 4 wheeled walker that he does not use. HHC offered and pt will is agreeable. Larry  list provided. CM to f/u prior to d/c for a HHC choice. Pt denies any other d/c needs at this time. Pt has a lot of family support. D/c plan is home alone with HHC.  CM to f/u for Larry 78 prior to d/c.  TE

## 2022-05-23 NOTE — PROGRESS NOTES
Pt family states that 3107 has not received a bath in the three days of his visit. Pt would like a bath tomorrow, 5/23/22, sometime during the day.

## 2022-05-23 NOTE — CONSULTS
[address], 1945, 3107/3107-A, 5/23/2022    History  [place]:  There were no encounter diagnoses. Patient  has a past medical history of Atherosclerosis of aortic arch ([address] COPD (chronic obstructive pulmonary disease) ([address] COPD, severe ([address] Essential hypertension, Full dentures, History of external beam radiation therapy, Hyperlipidemia, mixed, Hypertension, Hypothyroidism due to acquired atrophy of thyroid, On home oxygen therapy, PAD (peripheral artery disease) ([address] Primary lung squamous cell carcinoma, right ([address] Pulmonary nodule, left, Rectal cancer ([address] S/P colonoscopy, SCC (squamous cell carcinoma of lung), right ([address] Subclavian artery stenosis, right ([address] and Wears glasses. Patient  has a past surgical history that includes colectomy (2009); angioplasty (Bilateral, 2017); Cataract removal with implant (Left, 2018); Colonoscopy (last one 2017); Tonsillectomy (age 11); Dental surgery; Lung biopsy (12/07/2018); lymph node biopsy (07/2009); and Hemorrhoid surgery (2009). Subjective:  Patient states: \"Do you think I did okay? \"    Pain:  Denies pain.     Communication with other providers:  Handoff to RN, OT  Restrictions: general precautions, fall risk, O2    Home Setup/Prior level of function  Social/Functional History  Lives With: Son (who lives there \"part time\", has \"girlfriends\")  Type of Home: House  Home Layout: One level  Home Access: Level entry  Bathroom Shower/Tub: Tub/Shower unit  Bathroom Toilet: Standard  Bathroom Equipment: Grab bars in shower  Bathroom Accessibility: Accessible  Home Equipment: Walker, 4 wheeled,Oxygen (2L o2 at night)  ADL Assistance: Independent  Homemaking Assistance: Needs assistance (son/DIL assists with laundry PRN)  Homemaking Responsibilities: Yes  Ambulation Assistance: Independent (uses no AD)  Transfer Assistance: Independent  Active : Yes  Occupation: Retired  Leisure & Hobbies: Crossword puzzles    Examination of body systems (includes body structures/functions, activity/participation limitations):  · Observation:  Pt supine in bed with  upon arrival and agreeable to therapy  · Vision:  [facility]  · Hearing:  [facility]  · Cardiopulmonary:  2L O2, wears at night and PRN during activity  · Cognition: WFL, see OT/SLP note for further evaluation. Musculoskeletal  · ROM R/L:  WFL. · Strength R/L:  4+/5, minimal impairment in function and endurance. · Neuro:  [facility]      Mobility:  · Rolling L/R:  supervision  · Supine to sit:  SBA  · Transfers: pt completed STS to/from bed and to chair CGA  · Sitting balance:  good. · Standing balance:  Fair+. · Gait: Pt ambulated 100' without a device CGA with decreased estela and decreased step length bilaterally. Cues provided for pursed lip breathing    [facility] 6 Clicks Inpatient Mobility:  AM-PAC Inpatient Mobility Raw Score : 18    Safety: patient left in chair with alarm on, family in room, call light within reach, RN notified, gait belt used. Assessment:  Pt is a 68 y.o. male admitted to the hospital for PVD. Pt is typically mod I for all ambulation and transfers without a device. Pt is currently performing bed mobility SBA, transferring CGA, and ambulating 100' without a device CGA. Pt is presenting with decreased endurance, impaired balance, impaired transfers, and impaired gait. Pt would benefit from continued acute care PT as well as [facility] PT upon discharge to continue to address impairments. Complexity: moderate    Prognosis: Good, no significant barriers to participation at this time.      Plan: 2-3 times per week/week     Equipment: none    Goals:  Short Term Goals  Time Frame for Short term goals: 1 week  Short term goal 1: Pt to complete all bed mobility mod I  Short term goal 2: Pt to complete all STS transfers to/from bed, commode, and chair mod I  Short term goal 3: Pt to ambulate 200' with LRAD mod I       Treatment plan:  Bed mobility, transfers, balance, gait, TA, TX    Recommendations for NURSING mobility: amb with gait belt    Time:   Time in: 0912  Time out: 0934  Timed treatment minutes: 12  Total time: 22    Electronically signed by:    Pat Valencia, YAMILEX  5/23/2022, 10:55 AM

## 2022-05-23 NOTE — PROGRESS NOTES
V2.0  [facility] Hospitalist Progress Note      Name:  Pedro Arizmendi /Age/Sex: 1945  (68 y.o. male)   MRN & CSN:  [id number] & [id number] Encounter Date/Time: 2022 2:37 PM EDT    Location:  [address] [place] [address] PCP: April Concepcion MD       Hospital Day: 4    Assessment and Plan:   Pedro Arizmendi is a 68 y.o. male with pmh of lung cancer, rectal cancer, COPD, chronic hypoxic respiratory failure on 2 L nocturnal oxygen, who presents with right groin pain . Patient with history of PVD (peripheral vascular disease) ([address]      Plan:  Pedro Arizmendi is a 68 y.o. male who presents with right groin/thigh pain     Right thigh /groin pain:For 3 days in setting of peripheral vascular disease  CT angio done at [facility] showed superficial femoral artery occlusion,however has collateral circulation  DONAVAN index ordered, however unable to get done inpatient  Discussed with CTS, states patient has no ischemia of leg and can follow-up with them as outpatient to get the DONAVAN done. Recommends just baby aspirin at discharge. Seen by Ortho did not think is Ortho related   Doppler of right lower extremity done and showed questionable minimal nonocclusive acute common femoral vein DVT and mod non occlusive acute popliteal vein DVT. Patient started on Lovenox therapeutic dose. He was on heparin drip from admission to and transitioned to lovenox. Seen by heme/onc and switched to eliquis. CT chest rule out PE was negative. PT /OT eval.Was able to ambulate  Plan discharge tomorrow. Hypotension  BP down to 80 over 40s . Home metoprolol on hold and he has HCTZ ordered by his PCP for leg swelling but he said he has not started it. Has not been given here too. Responded to 500 cc fluid bolus and BP now stable. Labs unremarkable with normal renal function, WBC normal and procalcitonin low. UA low.   Gentle hydration overnight given he has received contrast.  BP now normal    Malignant neoplasm of lower lobe of right lung -Follows with Dr. Tai Bernal to be in remission. Rectal cancer : Needs follow-up colonoscopy. Chronic bronchitis/copd : Continue as needed nebs. Hyperlipidemia, mixed : Continue statin  Chronic hypoxic respiratory failure: On 2 L continuous. Same here  Hypothyroidism due to acquired atrophy of thyroid : Continue Synthroid 75 mcg daily. Check TSH. Anemia: Mild. Stable. Diet ADULT DIET; Regular  ADULT ORAL NUTRITION SUPPLEMENT; Breakfast, Lunch, Dinner; Standard High Calorie/High Protein Oral Supplement   DVT Prophylaxis [x] Lovenox, []  Heparin, [] SCDs, [] Ambulation,  [] Eliquis, [] Xarelto  [] Coumadin   Code Status Full Code   Disposition From: Home  Expected Disposition: Home  Estimated Date of Discharge: 1 day. Patient requires continued admission due to work up in progress. Surrogate Decision Maker/ BENNY Washington     Subjective:     Chief Complaint: No chief complaint on file. Darby Jerome is a 68 y.o. male who presents with right thigh/groin pain of 3 days duration limiting ambulation. History of PAD with femoral artery occlusion. Seen and evaluated at bedside. Sons at bedside. Pt up in recliner. States he walked with PT and did well. Offered to discharge him with Eliquis and he declined stating he still does not feel good enough to go. He will rather wait till tomorrow. Sons agreed to have him d/aristeo tomorrow. Review of Systems:    Review of Systems  Right groin and thigh pain    Objective: Intake/Output Summary (Last 24 hours) at 5/23/2022 1813  Last data filed at 5/23/2022 1706  Gross per 24 hour   Intake 371.25 ml   Output 1225 ml   Net -853.75 ml        Vitals:   Vitals:    05/23/22 1545   BP: (!) 148/64   Pulse: 81   Resp: 25   Temp: 98 °F (36.7 °C)   SpO2: 95%       Physical Exam:     General: NAD. O2 nasal cannula 2 L, looks underweight. Eyes: EOMI  ENT: neck supple  Cardiovascular: Regular rate.   Respiratory: Diminished bilaterally, no wheeze or crackles  Gastrointestinal: Soft, non tender, no palpable masses bowel sounds present  Genitourinary: no suprapubic tenderness. Minimal Right groin and thigh tenderness. Musculoskeletal: No edema. Lateral lower extremities warm. Skin: warm, dry  Neuro: Alert. Psych: Mood appropriate.      Medications:   Medications:    apixaban  10 mg Oral BID    doxycycline hyclate  100 mg Oral 2 times per day    guaiFENesin  600 mg Oral BID    atorvastatin  10 mg Oral Daily    levothyroxine  75 mcg Oral Daily    [Held by provider] metoprolol tartrate  12.5 mg Oral BID    nicotine  1 patch TransDERmal Daily    sodium chloride flush  10 mL IntraVENous 2 times per day    budesonide-formoterol  2 puff Inhalation BID    albuterol sulfate HFA  2 puff Inhalation Q4H WA    ipratropium  2 puff Inhalation Q4H WA    aspirin  81 mg Oral Daily      Infusions:    sodium chloride Stopped (05/23/22 1700)    sodium chloride       PRN Meds: albuterol sulfate HFA, 2 puff, Q6H PRN  [Held by provider] hydroCHLOROthiazide, 12.5 mg, Daily PRN  sodium chloride flush, 10 mL, PRN  sodium chloride, , PRN  ondansetron, 4 mg, Q8H PRN   Or  ondansetron, 4 mg, Q6H PRN  bisacodyl, 5 mg, Daily PRN  acetaminophen, 650 mg, Q6H PRN   Or  acetaminophen, 650 mg, Q6H PRN      Labs      Recent Results (from the past 24 hour(s))   APTT    Collection Time: 05/22/22  8:09 PM   Result Value Ref Range    aPTT 32.6 25.1 - 37.1 SECONDS   APTT    Collection Time: 05/23/22  1:24 AM   Result Value Ref Range    aPTT 34.0 25.1 - 37.1 SECONDS   APTT    Collection Time: 05/23/22  8:47 AM   Result Value Ref Range    aPTT 29.5 25.1 - 37.1 SECONDS   Basic Metabolic Panel w/ Reflex to MG    Collection Time: 05/23/22  8:47 AM   Result Value Ref Range    Sodium 140 135 - 145 MMOL/L    Potassium 4.1 3.5 - 5.1 MMOL/L    Chloride 103 99 - 110 mMol/L    CO2 26 21 - 32 MMOL/L    Anion Gap 11 4 - 16    BUN 6 6 - 23 MG/DL    CREATININE 0.6 (L) 0.9 - 1.3 MG/DL    Glucose 95 70 - 99 MG/DL    Calcium 8.1 (L) 8.3 - 10.6 MG/DL    GFR Non-African American >60 >60 mL/min/1.73m2    GFR African American >60 >60 mL/min/1.73m2   CBC auto differential    Collection Time: 05/23/22  8:47 AM   Result Value Ref Range    WBC 5.1 4.0 - 10.5 K/CU MM    RBC 3.49 (L) 4.6 - 6.2 M/CU MM    Hemoglobin 11.4 (L) 13.5 - 18.0 GM/DL    Hematocrit 36.2 (L) 42 - 52 %    .7 (H) 78 - 100 FL    MCH 32.7 (H) 27 - 31 PG    MCHC 31.5 (L) 32.0 - 36.0 %    RDW 13.3 11.7 - 14.9 %    Platelets 973 192 - 481 K/CU MM    MPV 8.9 7.5 - 11.1 FL    Differential Type AUTOMATED DIFFERENTIAL     Segs Relative 75.4 (H) 36 - 66 %    Lymphocytes % 10.8 (L) 24 - 44 %    Monocytes % 12.0 (H) 0 - 4 %    Eosinophils % 0.6 0 - 3 %    Basophils % 0.4 0 - 1 %    Segs Absolute 3.8 K/CU MM    Lymphocytes Absolute 0.6 K/CU MM    Monocytes Absolute 0.6 K/CU MM    Eosinophils Absolute 0.0 K/CU MM    Basophils Absolute 0.0 K/CU MM    Nucleated RBC % 0.0 %    Total Nucleated RBC 0.0 K/CU MM    Total Immature Neutrophil 0.04 K/CU MM    Immature Neutrophil % 0.8 (H) 0 - 0.43 %   APTT    Collection Time: 05/23/22  1:59 PM   Result Value Ref Range    aPTT 33.3 25.1 - 37.1 SECONDS        Imaging/Diagnostics Last 24 Hours     Venous Doppler right lower extremity  Impression   1. Questionable minimal nonocclusive acute common femoral vein DVT. 2. Moderate nonocclusive acute popliteal vein DVT. Exam results were called by Dr. Roberta Richards.  Fouzia Zamorano MD to Dr. Elsy Kaiser on   5/22/2022 at 13:16.             Electronically signed by Godwin Oquendo MD on 5/23/2022 at 6:13 PM

## 2022-05-23 NOTE — PROGRESS NOTES
Occupational Therapy  [facility] ACUTE CARE OCCUPATIONAL THERAPY EVALUATION    Cedrick Presley, 1945, 3107/3107-A, 5/23/2022    Discharge Recommendation: Home with initial 24 hour supervision/assistance, Home Health OT services, Shower Chair      History:  [place]:  There were no encounter diagnoses. Subjective:  Patient states: \"I am ready to get up and moving today!  \"  Pain: Pt denied pain this date  Communication with other providers: PT Hardy Mccauley to RN  Restrictions: General Precautions, Fall Risk, Telemetry, Pulse Ox, BP cuff, IV, bed/chair alarm, 2L NC O2    Home Setup/Prior level of function:  Social/Functional History  Lives With: Son (who lives there \"part time\", has \"girlfriends\")  Type of Home: House  Home Layout: One level  Home Access: Level entry  Bathroom Shower/Tub: Tub/Shower unit  Bathroom Toilet: Standard  Bathroom Equipment: Grab bars in shower  Bathroom Accessibility: Accessible  Home Equipment: Jeananne Garre, 4 wheeled,Oxygen (2L o2 at night)  ADL Assistance: Independent  Homemaking Assistance: Needs assistance (son/DIL assists with laundry PRN)  Homemaking Responsibilities: Yes  Ambulation Assistance: Independent (uses no AD)  Transfer Assistance: Independent  Active : Yes  Occupation: Retired  Leisure & Hobbies: Crossword puzzles    Examination:  · Observation: Supine in bed upon arrival, pleasant and agreeable to OT evaluation  · [email or web address] (wears [facility]  · Hearing: WFL  · Vitals: Stable vitals throughout session    Body Systems and functions:  · ROM: WFL in BL UEs    · Strength: 4-/5 in all major muscle groups of BL UEs   · Sensation: WFL (no numbness or tingling reported)  · Tone: Normal  · Coordination: WFL  · Perception: WNL    Activities of Daily Living (ADLs):  · Feeding: Supervision/set up(packages opened and utensils within reach)  · Grooming: SBA(seated with all necessary items within reach)  · UB bathing: SBA(seated with all necessary items within reach)  · LB bathing: CGA(steadying during cleansing of distal BL LEs)  · UB dressing: SBA(seated with clothes within reach)  · LB dressing: CGA(while managing clothing up to hips in standing, SBA for donning BL LE socks while seated EOB)  · Toileting: CGA(steadying during clothing mgmt before and after toileting)     Cognitive and Psychosocial Functioning:  · Overall cognitive status: WFL  · Affect: Normal     Balance:   · Sitting: SBA static and dynamic sitting  · Standing: CGA with RW    Functional Mobility:  · Bed Mobility: SBA supine to sitting EOB, cueing provided to get legs over the edge of the bed, scooting hips forward and getting feet flat on the floor  · Transfers: CGA sit to stand from EOB, CGA stand to sit on bed, CGA sit to stand from bed, CGA stand to sit in chair, cueing provided to reach back for arm rests and controlled descent to landing surfaces   · Ambulation: CGA with RW ~100ft from room to out in hallway      AM-PAC 6 click short form for inpatient daily activity:   How much help from another person does the patient currently need. .. Unable  Dep A Lot  Max A A Lot   Mod A A Little  Min A A Little   CGA  SBA None   Mod I  Indep  Sup   1. Putting on and taking off regular lower body clothing? [] 1    [] 2   [] 2   [] 3   [x] 3   [] 4      2. Bathing (including washing, rinsing, drying)? [] 1   [] 2   [] 2 [] 3 [x] 3 [] 4   3. Toileting, which includes using toilet, bedpan, or urinal? [] 1    [] 2   [] 2   [] 3   [x] 3   [] 4     4. Putting on and taking off regular upper body clothing? [] 1   [] 2   [] 2   [] 3   [x] 3    [] 4      5. Taking care of personal grooming such as brushing teeth? [] 1   [] 2    [] 2 [] 3    [x] 3   [] 4      6. Eating meals?    [] 1   [] 2   [] 2   [] 3   [] 3   [x] 4      Raw Score: 19    [24=0% impaired(CH), 23=1-19%(CI), 20-22=20-39%(CJ), 15-19=40-59%(CK), 10-14=60-79%(CL), 7-9=80-99%(CM), 6=100%(CN)]     Treatment:  Therapeutic Activity Training:   Therapeutic activity training was instructed today. Cues were given for safety, sequence, UE/LE placement, awareness, and balance. Activities performed today included bed mobility training, functional transfer training, functional mobility training, sup-sit, sit-stand, role of OT, importance of OOB/EOB activity, discharge planning    Safety Measures: Gait belt used, Left in Chair, Alarm in place, Call light and phone within reach    Assessment:  Pt is a 67 y/o male who  has a past medical history of Atherosclerosis of aortic arch ([facility] [address] COPD (chronic obstructive pulmonary disease) ([address] COPD, severe ([address] Essential hypertension, Full dentures, History of external beam radiation therapy, Hyperlipidemia, mixed, Hypertension, Hypothyroidism due to acquired atrophy of thyroid, On home oxygen therapy, PAD (peripheral artery disease) ([address] [facility] [address] Primary lung squamous cell carcinoma, right ([facility] [facility] [address] Pulmonary nodule, left, Rectal cancer ([address] S/P colonoscopy, SCC (squamous cell carcinoma of lung), right ([facility] [facility] [address] Subclavian artery stenosis, right ([address] and Wears glasses. Pt was admitted due to peripheral vascular disease(PVD), Rt thigh pain and hypotension. At baseline, pt is independent with ADLs, ambulates with no AD, receives assist for IADLs from family, and drives. Presently, pt is functioning slightly below baseline and would benefit from continued acute OT services. Pt's son lives with him and will be able to assist PRN. At discharge, OT recommends home with initial 24 hr supervision, a shower chair, and [place] OT for a home safety evaluation. Complexity: Moderate  Prognosis: Good  Plan: 2+x/week      Goals:  1. Pt will complete all aspects of bed mobility for EOB/OOB ADLs with supervision and good safety awareness. 2. Pt will complete UB/LB bathing SBA with use of adapted techniques PRN. 3. Pt will complete all aspects of LB dressing SBA with use of adapted techniques and good safety awareness.   4. Pt will complete all functional transfers to and from bed, chair, toilet, shower chair SBA with good safety awareness. 5. Pt will ambulate HH distance to bathroom for toileting SBA with RW.  6. Pt will complete all aspects of toileting task SBA with good safety awareness. 7. Pt will complete oral hygiene/grooming routine in standing at sink SBA. 8. Pt will complete ther ex/ther act with focus on functional sitting and standing to improve activity tolerance for self-care. Time:   Time in: 0911  Time out: 0934  Timed treatment minutes: 8  Total time: 23      Electronically signed by:      Edis Burns,S/OT    Rosalva Gutierrez, OTR/L, ALEKSANDRA, [id number]    \"I, the qualified professional, was present and in the room for the entire session. The student participates in the delivery of services when the qualified practitioner is directing the service, making the skilled judgment, and is responsible for the assessment and treatment. \"

## 2022-05-23 NOTE — PROGRESS NOTES
Mali Stewart Relic and reported the patients IV had infiltrated. He tells me it is okay for patient to NOT have an IV as discharge is anticipated tomorrow.

## 2022-05-23 NOTE — CARE COORDINATION
Family informed this CM that pt would like to complete medical POA paperwork today at 10 am d/t his family will be here then. Notified Tre Glaser and put in the Consult to spiritual care.   Tre Glaser states that he will be here at 10am.  TE

## 2022-05-23 NOTE — PROGRESS NOTES
Comprehensive Nutrition Assessment    Type and Reason for Visit:  Initial (low BMI)    Nutrition Recommendations/Plan:   1. Add standard oral nutrition supplements  2. Please obtain measured weight so that nutrition status can be more accurately assessed  3. Will monitor po intake, weight trends, poc     Malnutrition Assessment:  Malnutrition Status:  Insufficient data (05/23/22 1318)    Context:  Acute Illness     Nutrition Assessment:    Pt admitted for PVD, PMH: SCC, rectal ca, PAD, HTN, COPD, HLD, pt currently on regular diet, 1 meal of % documented in the past 48 hr, pt on 2 L NC, no significant wt loss noted based on admission weight (unknown source), pt unavailable at time of visit attempt, will follow at high nutrition risk    Nutrition Related Findings:      Wound Type: None       Current Nutrition Intake & Therapies:    Average Meal Intake: % (1 meal documented)  Average Supplements Intake: None Ordered  ADULT DIET; Regular    Anthropometric Measures:  Height: 5' 7.01\" (170.2 cm)  Ideal Body Weight (IBW): 148 lbs (67 kg)    Current Body Weight: 119 lb 14.9 oz (54.4 kg) (unknown weight source), 81 % IBW.     Current BMI (kg/m2): 18.8  Weight Adjustment For: No Adjustment  BMI Categories: Underweight (BMI less than 22) age over 72    Estimated Daily Nutrient Needs:  Energy Requirements Based On: Kcal/kg  Weight Used for Energy Requirements: Current  Energy (kcal/day): 6741-7022 (30-35 kcal/kg)  Weight Used for Protein Requirements: Current  Protein (g/day): 65-82 (1.2-1.5 g/kg)  Method Used for Fluid Requirements: 1 ml/kcal  Fluid (ml/day): 0117-9020    Nutrition Diagnosis:   · Underweight related to inadequate protein-energy intake as evidenced by BMI    Nutrition Interventions:   Food and/or Nutrient Delivery: Continue Current Diet,Start Oral Nutrition Supplement  Nutrition Education/Counseling: No recommendation at this time  Coordination of Nutrition Care: Continue to monitor while inpatient Goals:     Goals: PO intake 75% or greater,by next RD assessment     Nutrition Monitoring and Evaluation:   Behavioral-Environmental Outcomes: None Identified  Food/Nutrient Intake Outcomes: Food and Nutrient Intake,Supplement Intake  Physical Signs/Symptoms Outcomes: Biochemical Data,GI Status,Meal Time Behavior,Hemodynamic Status,Weight,Skin    Discharge Planning:     Too soon to determine     Joann Hughes [address], [address], [place]  Contact: [phone]

## 2022-05-23 NOTE — PROGRESS NOTES
Patient was seen and examined. Will change his AC therapy to eliquis. Full consult will follow. Thank you.

## 2022-05-23 NOTE — CONSULTS
ONCOLOGY HEMATOLOGY CARE ([facility])  CONSULTATION REPORT    REASON FOR CONSULT    Known patient, new DVT    CHIEF COMPLAINT      Right thigh pain     HISTORY OF PRESENT ILLNESS   Gilbert Borja is a 68 y.o. 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 Reportedly this had decreased his ability to ambulate independently. He has a history of Stage IA1 right lower lobe squamous cell lung cancer and completed stereotactic body radiation therapy which he completed 2/26/2019. He has been under close surveillance since that time. Review of CTA without PE. Noted to have stable superior segment mass like consolidation without definite evidence of malignant disease progression. Noted to have right SFA occlusion by CTA performed at [facility]. CTS was consulted. DONAVAN ordered. Venous doppler revealed minimal nonocclusive acute common femoral vein DVT, moderate non occlusive acute popliteal vein DVT. He denies recent falls, long travel or instrumentation. He is not using testosterone supplementation. Due to history of lung cancer, now with acute DVT we were called to evaluate.     PAST MEDICAL HISTORY    Past Medical History:   Diagnosis Date    Atherosclerosis of aortic arch ([address]     NOTED ON CXR 1/2017- CONSIDER CARDIO CONSULT    COPD (chronic obstructive pulmonary disease) ([address]     follow with Dr Pan Carmona- iza ineffective    COPD, severe ([address] 3/14/2022    Essential hypertension 01/29/2018    Full dentures     History of external beam radiation therapy 2009/2019    pelvis/anus in 2009 and right lower lobe lung mass SBRT in 2019 per Dr. Rodriguez Stage at [facility]    Hyperlipidemia, mixed     Hypertension     Hypothyroidism due to acquired atrophy of thyroid 05/11/2021    ANTIPEROXIDASE ANTIBODY NEGATIVE    On home oxygen therapy     2l/nc at night and prn    PAD (peripheral artery disease) (HCC)     R leg on doppler 8/2017-- referring to Dr Saintclair Li    Primary lung squamous cell carcinoma, right ([address] 12/2018    Dr Nirmal Brownlee, Dr Hussain Quan, Dr Juan Daniel Pina- treated w RT,    Pulmonary nodule, left 07/2018    Rectal cancer [facility]) 2009    Dr Bello Saenz oncology, seen by GI at [facility]- Dr Army Cota- tx with chemo and radiation     S/P colonoscopy 02/01/2017    Dr Ralph Gonzalez at [facility]-  diverticulosis and angioectasis, being referred to surgeon--CONSIDER RECHECK 5 YRS    SCC (squamous cell carcinoma of lung), right [facility])     Dr Nirmal Brownlee, periodically Dr Juan Daniel Pina.  had resection w Dr Saintclair Li    Subclavian artery stenosis, right [facility])     Wears glasses        SURGICAL HISTORY    Past Surgical History:   Procedure Laterality Date    ANGIOPLASTY Bilateral 2017    Dr Pilar Hill for bilat leg PVD    CATARACT REMOVAL WITH IMPLANT Left 2018    Dr Bonilla Isaacs  2009    Dr Lisa Delgado then needed revision at [address] [place]  last one 2017    [address]  2009    LUNG BIOPSY  12/07/2018    LYMPH NODE BIOPSY  07/2009    right axilla    TONSILLECTOMY  age 11       FAMILY HISTORY    Family History   Problem Relation Age of Onset    Cirrhosis Mother     Lung Cancer Father     Cancer Father         brain cancer       SOCIAL HISTORY    Social History     Socioeconomic History    Marital status:      Spouse name: Not on file    Number of children: 6    Years of education: Not on file    Highest education level: Not on file   Occupational History    Occupation: retired   Tobacco Use    Smoking status: Current Every Day Smoker     Packs/day: 1.00     Years: 60.00     Pack years: 60.00     Types: Cigarettes    Smokeless tobacco: Never Used   Vaping Use    Vaping Use: Former   Substance and Sexual Activity    Alcohol use:  Yes     Alcohol/week: 20.0 standard drinks     Types: 20 Cans of beer per week Comment: average\"3 beers per day\" per pt on 12/6/2018    Drug use: No    Sexual activity: Not on file   Other Topics Concern    Not on file   Social History Narrative    6 childrean total ; strong, supportive family. Family does grocery shopping, clean home and prepare meals. Social Determinants of Health     Financial Resource Strain: Low Risk     Difficulty of Paying Living Expenses: Not hard at all   Food Insecurity: No Food Insecurity    Worried About [address] in the Last Year: Never true    [address] [facility] [employer] in the Last Year: Never true   Transportation Needs: No Transportation Needs    Lack of Transportation (Medical): No    Lack of Transportation (Non-Medical):  No   Physical Activity: Insufficiently Active    Days of Exercise per Week: 3 days    Minutes of Exercise per Session: 10 min   Stress: No Stress Concern Present    Feeling of Stress : Not at all   Social Connections: Unknown    Frequency of Communication with Friends and Family: Twice a week    Frequency of Social Gatherings with Friends and Family: Once a week    Attends Hinduism Services: Never    Active Member of Clubs or Organizations: No    Attends Club or Organization Meetings: Never    Marital Status: Not on file   Intimate Partner Violence:     Fear of Current or Ex-Partner: Not on file    Emotionally Abused: Not on file    Physically Abused: Not on file    Sexually Abused: Not on file   Housing Stability: [address] to Pay for Housing in the Last Year: No    Number of Places Lived in the Last Year: 1    Unstable Housing in the Last Year: No       REVIEW OF SYSTEMS    Constitutional:  Denies fever, chills, loss of appetite, weight loss, tiredness, fatigue or weakness   HEENT:  Denies swelling of neck glands  Respiratory:  Denies cough, shortness of breath or hemoptysis  Cardiovascular:  Denies chest pain, palpitations or swelling   GI:  Denies abdominal pain, nausea, vomiting, constipation or diarrhea   Musculoskeletal: Denies back pain   Skin:  Denies rash   Neurologic:  Denies headache, focal weakness or sensory changes   All systems negative except as marked. PHYSICAL EXAM    Vitals: BP (!) 130/47   Pulse 76   Temp 98.1 °F (36.7 °C) (Oral)   Resp 20   Ht 5' 7.01\" (1.702 m)   Wt 120 lb (54.4 kg)   SpO2 96%   BMI 18.79 kg/m²   CONSTITUTIONAL: awake, alert, cooperative, no apparent distress   EYES: pupils equal, round and reactive to light, sclera clear and conjunctiva normal  ENT: Normocephalic, without obvious abnormality, atraumatic  NECK: supple, symmetrical, no jugular venous distension and no carotid bruits   HEMATOLOGIC/LYMPHATIC: no cervical, supraclavicular or axillary lymphadenopathy   LUNGS: VBS, no wheezes, no crackles, no rhonchi, no increased work of breathing and clear to auscultation   CARDIOVASCULAR: regular rate and rhythm, normal S1 and S2, no murmur noted  ABDOMEN: normal bowel sounds x 4, soft, non-distended, non-tender, no masses palpated, no hepatosplenomgaly   MUSCULOSKELETAL: full range of motion noted, tone is normal  NEUROLOGIC: awake, alert, oriented to name, place and time. Motor skills grossly intact. SKIN: Normal skin color, texture, turgor and no jaundice. Skin appears intact   EXTREMITIES: no LE edema, no cyanosis, no leg swelling, no clubbing    LABORATORY RESULTS  CBC:   Recent Labs     05/21/22  0203 05/22/22  1004 05/23/22  0847   WBC 5.6 6.4 5.1   HGB 11.2* 11.7* 11.4*    327 283     BMP:    Recent Labs     05/22/22  1004      K 3.9   CL 98*   CO2 25   BUN 10   CREATININE 0.6*   GLUCOSE 98     Hepatic: No results for input(s): AST, ALT, ALB, BILITOT, ALKPHOS in the last 72 hours. INR: No results for input(s): INR in the last 72 hours. RADIOLOGY REPORTS  CTA chest 5/22/22  Impression   1. No acute pulmonary emboli.    2. Severe COPD with acute bilateral lower lobe progressive bronchial   thickening and endobronchial debris with patchy bilateral lower lobe basilar mild consolidations suggesting acute bronchopneumonia with progressive mucous   plugging on chronic bronchitis. 3. History of right squamous cell cancer with stable right lower lobe   superior segment masslike consolidation and architectural distortion. No   definite evidence of malignant disease progression. Venous doppler 5/22/22  Impression   1. Questionable minimal nonocclusive acute common femoral vein DVT. 2. Moderate nonocclusive acute popliteal vein DVT. Exam results were called by Dr. Ambar Barakat. Cornelio Bravo MD to Dr. Doroteo Holder on   5/22/2022 at 13:16. ASSESSMENT    DVT  Hx of lung cancer    RECOMMENDATION    Noted to have questionable nonocclusive acute common femoral veing DVT, moderate non occlusive acute popliteal vein DVT. This is likely secondary to immobility related to dyspnea associated with COPD. We will plan to transition from Lovenox to Eliquis. Duration of anticoagulation at least three months, pending evaluation for functional status or risk factors preventing use of AC. Review of CTA with stable right lower lobe masklike consolidation without definite evidence of malignant disease progression. We will continue active surveillance per protocol. He will need to continue to follow up with our office as an outpatient. We will follow the patient. Thank you for allowing me to participate in the care of this very pleasant patient. I have independently evaluated and examined this patient today. I have reviewed radiologic and biochemical tests on this patient. Management Plan is developed mutually with Camryn Medellin NP.  I have reviewed above note and agree with assessment and plan

## 2022-05-24 VITALS
TEMPERATURE: 98.2 F | HEART RATE: 77 BPM | BODY MASS INDEX: 18.83 KG/M2 | OXYGEN SATURATION: 95 % | SYSTOLIC BLOOD PRESSURE: 124 MMHG | DIASTOLIC BLOOD PRESSURE: 58 MMHG | HEIGHT: 67 IN | WEIGHT: 120 LBS | RESPIRATION RATE: 24 BRPM

## 2022-05-24 LAB
ANION GAP SERPL CALCULATED.3IONS-SCNC: 9 MMOL/L (ref 4–16)
APTT: 34.1 SECONDS (ref 25.1–37.1)
APTT: 44.3 SECONDS (ref 25.1–37.1)
BASOPHILS ABSOLUTE: 0 K/CU MM
BASOPHILS RELATIVE PERCENT: 0.4 % (ref 0–1)
BUN BLDV-MCNC: 7 MG/DL (ref 6–23)
CALCIUM SERPL-MCNC: 8.3 MG/DL (ref 8.3–10.6)
CHLORIDE BLD-SCNC: 103 MMOL/L (ref 99–110)
CO2: 27 MMOL/L (ref 21–32)
CREAT SERPL-MCNC: 0.6 MG/DL (ref 0.9–1.3)
DIFFERENTIAL TYPE: ABNORMAL
EOSINOPHILS ABSOLUTE: 0 K/CU MM
EOSINOPHILS RELATIVE PERCENT: 0.6 % (ref 0–3)
GFR AFRICAN AMERICAN: >60 ML/MIN/1.73M2
GFR NON-AFRICAN AMERICAN: >60 ML/MIN/1.73M2
GLUCOSE BLD-MCNC: 90 MG/DL (ref 70–99)
HCT VFR BLD CALC: 34.7 % (ref 42–52)
HEMOGLOBIN: 10.8 GM/DL (ref 13.5–18)
IMMATURE NEUTROPHIL %: 0.4 % (ref 0–0.43)
LYMPHOCYTES ABSOLUTE: 0.4 K/CU MM
LYMPHOCYTES RELATIVE PERCENT: 8.5 % (ref 24–44)
MCH RBC QN AUTO: 32.3 PG (ref 27–31)
MCHC RBC AUTO-ENTMCNC: 31.1 % (ref 32–36)
MCV RBC AUTO: 103.9 FL (ref 78–100)
MONOCYTES ABSOLUTE: 0.6 K/CU MM
MONOCYTES RELATIVE PERCENT: 12.1 % (ref 0–4)
NUCLEATED RBC %: 0 %
PDW BLD-RTO: 13.4 % (ref 11.7–14.9)
PLATELET # BLD: 287 K/CU MM (ref 140–440)
PMV BLD AUTO: 9.1 FL (ref 7.5–11.1)
POTASSIUM SERPL-SCNC: 4.1 MMOL/L (ref 3.5–5.1)
RBC # BLD: 3.34 M/CU MM (ref 4.6–6.2)
SEGMENTED NEUTROPHILS ABSOLUTE COUNT: 3.9 K/CU MM
SEGMENTED NEUTROPHILS RELATIVE PERCENT: 78 % (ref 36–66)
SODIUM BLD-SCNC: 139 MMOL/L (ref 135–145)
TOTAL IMMATURE NEUTOROPHIL: 0.02 K/CU MM
TOTAL NUCLEATED RBC: 0 K/CU MM
WBC # BLD: 5 K/CU MM (ref 4–10.5)

## 2022-05-24 PROCEDURE — 80048 BASIC METABOLIC PNL TOTAL CA: CPT

## 2022-05-24 PROCEDURE — 6370000000 HC RX 637 (ALT 250 FOR IP): Performed by: INTERNAL MEDICINE

## 2022-05-24 PROCEDURE — 36415 COLL VENOUS BLD VENIPUNCTURE: CPT

## 2022-05-24 PROCEDURE — 85025 COMPLETE CBC W/AUTO DIFF WBC: CPT

## 2022-05-24 PROCEDURE — 85730 THROMBOPLASTIN TIME PARTIAL: CPT

## 2022-05-24 RX ORDER — METOPROLOL SUCCINATE 25 MG/1
25 TABLET, EXTENDED RELEASE ORAL DAILY
Qty: 30 TABLET | Refills: 3 | Status: SHIPPED | OUTPATIENT
Start: 2022-05-24 | End: 2022-07-25 | Stop reason: SDUPTHER

## 2022-05-24 RX ORDER — ASPIRIN 81 MG/1
81 TABLET, CHEWABLE ORAL DAILY
Qty: 30 TABLET | Refills: 3 | Status: SHIPPED | OUTPATIENT
Start: 2022-05-25 | End: 2022-11-02 | Stop reason: SDUPTHER

## 2022-05-24 RX ADMIN — Medication 2 PUFF: at 11:46

## 2022-05-24 RX ADMIN — ATORVASTATIN CALCIUM 10 MG: 10 TABLET, FILM COATED ORAL at 08:12

## 2022-05-24 RX ADMIN — APIXABAN 10 MG: 5 TABLET, FILM COATED ORAL at 08:12

## 2022-05-24 RX ADMIN — ALBUTEROL SULFATE 2 PUFF: 90 AEROSOL, METERED RESPIRATORY (INHALATION) at 07:49

## 2022-05-24 RX ADMIN — ALBUTEROL SULFATE 2 PUFF: 90 AEROSOL, METERED RESPIRATORY (INHALATION) at 11:45

## 2022-05-24 RX ADMIN — Medication 2 PUFF: at 07:50

## 2022-05-24 RX ADMIN — LEVOTHYROXINE SODIUM 75 MCG: 0.07 TABLET ORAL at 06:19

## 2022-05-24 RX ADMIN — ASPIRIN 81 MG 81 MG: 81 TABLET ORAL at 08:12

## 2022-05-24 RX ADMIN — BUDESONIDE AND FORMOTEROL FUMARATE DIHYDRATE 2 PUFF: 160; 4.5 AEROSOL RESPIRATORY (INHALATION) at 07:51

## 2022-05-24 RX ADMIN — GUAIFENESIN 600 MG: 600 TABLET, EXTENDED RELEASE ORAL at 08:12

## 2022-05-24 RX ADMIN — DOXYCYCLINE HYCLATE 100 MG: 100 TABLET, COATED ORAL at 08:12

## 2022-05-24 NOTE — PLAN OF CARE
Problem: Discharge Planning  Goal: Discharge to home or other facility with appropriate resources  Outcome: Completed     Problem: Skin/Tissue Integrity  Goal: Absence of new skin breakdown  Description: 1. Monitor for areas of redness and/or skin breakdown  2. Assess vascular access sites hourly  3. Every 4-6 hours minimum:  Change oxygen saturation probe site  4. Every 4-6 hours:  If on nasal continuous positive airway pressure, respiratory therapy assess nares and determine need for appliance change or resting period.   Outcome: Completed     Problem: Safety - Adult  Goal: Free from fall injury  Outcome: Completed     Problem: ABCDS Injury Assessment  Goal: Absence of physical injury  Outcome: Completed     Problem: Nutrition Deficit:  Goal: Optimize nutritional status  Outcome: Completed

## 2022-05-24 NOTE — PROGRESS NOTES
Outpatient Pharmacy Progress Note for Meds-to-Beds    Total number of Prescriptions Filled: 4  The following medications were dispensed to the patient during the discharge process:  Eliquis starter pack and maintenance   Aspirin  Metoprolol    Additional Documentation:  Patient picked-up the medication(s) in the [address] coupon card was applied to provide patient with a $0 co-pay for Eliquis. Thank you for letting us serve your patients.   [facility]    [address], [address] [facility]    Phone: [phone]    Fax: [phone]

## 2022-05-24 NOTE — DISCHARGE INSTR - COC
Continuity of Care Form    Patient Name: Ishmael Urbina   :  1945  MRN:  [id number]    Admit date:  2022  Discharge date:  ***    Code Status Order: Full Code   Advance Directives:      Admitting Physician:  Miriam Perez MD  PCP: Dennys Rae MD    Discharging Nurse: [facility] Unit/Room#: 4006/4006-A  Discharging Unit Phone Number: ***    Emergency Contact:   Extended Emergency Contact Information  Primary Emergency Contact: Shruthi Morales  Mobile Phone: [phone]  Relation: None  Secondary Emergency Contact: [place] [address] Phone: [phone]  Mobile Phone: [phone]  Relation: Child    Past Surgical History:  Past Surgical History:   Procedure Laterality Date    ANGIOPLASTY Bilateral     Dr Daryle Buoy for bilat leg PVD    CATARACT REMOVAL WITH IMPLANT Left     Dr Viv Kennedy      Dr Viv Carrera then needed revision at [address]  last one 2017    [address]      LUNG BIOPSY  2018    LYMPH NODE BIOPSY  2009    right axilla    TONSILLECTOMY  age 11       Immunization History:   Immunization History   Administered Date(s) Administered    COVID-19, Pfizer Purple top, DILUTE for use, 12+ yrs, 30mcg/0.3mL dose 2021, 2021, 10/21/2021    Influenza Virus Vaccine 2013    Influenza, High Dose (Fluzone 65 yrs and older) 10/12/2017, 10/17/2018, 2020    Pneumococcal Conjugate 13-valent ([place]) 10/31/2016    Pneumococcal Polysaccharide ([id number]) 2017       Active Problems:  Patient Active Problem List   Diagnosis Code    Rectal cancer ([facility] C20    Atherosclerosis of aortic arch ([facility] I70.0    Hyperlipidemia, mixed E78.2    S/P colonoscopy Z98.890    PVD (peripheral vascular disease) with claudication ([facility] I73.9    Essential hypertension I10    Subclavian artery stenosis, right (HCC) I77.1    Pulmonary nodule, left R91.1    Carotid stenosis, bilateral I65.23    Pulmonary nodule, right R91.1    SOB (shortness of breath) on exertion R06.02    Primary lung squamous cell carcinoma, right (HCC) C34.91    Malignant neoplasm of lower lobe of right lung (HCC) C34.31    SCC (squamous cell carcinoma of lung), right (HCC) C34.91    Hypothyroidism due to acquired atrophy of thyroid E03.4    Respiratory distress R06.03    Pneumothorax on right J93.9    Chronic respiratory failure with hypoxia (HCC) J96.11    COPD, severe (HCC) J44.9    PVD (peripheral vascular disease) (HCC) I73.9       Isolation/Infection:   Isolation            No Isolation          Patient Infection Status       Infection Onset Added Last Indicated Last Indicated By Review Planned Expiration Resolved Resolved By    None active    Resolved    COVID-19 (Rule Out) 09/20/21 09/20/21 09/20/21 Covid-19 Ambulatory (Ordered)   09/22/21 Rule-Out Test Resulted            Nurse Assessment:  Last Vital Signs: BP (!) 124/58   Pulse 77   Temp 98.2 °F (36.8 °C) (Oral)   Resp 24   Ht 5' 7.01\" (1.702 m)   Wt 120 lb (54.4 kg)   SpO2 95%   BMI 18.79 kg/m²     Last documented pain score (0-10 scale):    Last Weight:   Wt Readings from Last 1 Encounters:   05/20/22 120 lb (54.4 kg)     Mental Status:  {IP PT MENTAL STATUS:20030}    IV Access:  { MAGGIE IV ACCESS:052099991}    Nursing Mobility/ADLs:  Walking   {[facility] DME HEWH:214258160}  Transfer  {[facility] DME CVTR:570523140}  Bathing  {[facility] DME ZQQL:156150158}  Dressing  {[facility] DME CJGF:461318104}  Toileting  {[facility] DME IKND:392332730}  Feeding  {[facility] DME VVZA:699538447}  Med Admin  {[facility] DME HLSA:737906015}  Med Delivery   { MAGGIE MED Delivery:648599120}    Wound Care Documentation and Therapy:  Incision 07/25/21 Abdomen Lateral;Right;Upper (Active)   Number of days: 303        Elimination:  Continence:    Bowel: {YES / QH:94811}  Bladder: {YES / DM:02404}  Urinary Catheter: {Urinary Catheter:540498063}   Colostomy/Ileostomy/Ileal Conduit: {YES / NW:75026}       Date of Last BM: ***    Intake/Output Summary (Last 24 hours) at 2022 1256  Last data filed at 2022 0553  Gross per 24 hour   Intake --   Output 850 ml   Net -850 ml     I/O last 3 completed shifts:  In: -   Out: 3782 [Urine:1575]    Safety Concerns:     [address] Safety Concerns:541297768}    Impairments/Disabilities:      [address] Impairments/Disabilities:492170573}    Nutrition Therapy:  Current Nutrition Therapy:   [address] Diet List:211497575}    Routes of Feeding: {CHP DME Other Feedings:052875503}  Liquids: {Slp liquid thickness:62652}  Daily Fluid Restriction: {CHP DME Yes amt example:730487396}  Last Modified Barium Swallow with Video (Video Swallowing Test): {Done Not Done KTC}    Treatments at the Time of Hospital Discharge:   Respiratory Treatments: ***  Oxygen Therapy:  {Therapy; copd oxygen:37838}  Ventilator:    { CC Vent SHYC:176301228}    Rehab Therapies: {THERAPEUTIC INTERVENTION:2320580848}  Weight Bearing Status/Restrictions: { CC Weight Bearin}  Other Medical Equipment (for information only, NOT a DME order):  {EQUIPMENT:496805155}  Other Treatments: ***    Patient's personal belongings (please select all that are sent with patient):  {[facility] DME Belongings:194805900}    RN SIGNATURE:  {Esignature:923666621}    CASE MANAGEMENT/SOCIAL WORK SECTION    Inpatient Status Date: ***    Readmission Risk Assessment Score:  Readmission Risk              Risk of Unplanned Readmission:  12           Discharging to Facility/ Agency   Name:   Address:  Phone:  Fax:    Dialysis Facility (if applicable)   Name:  Address:  Dialysis Schedule:  Phone:  Fax:    / signature: {Esignature:777992785}    PHYSICIAN SECTION    Prognosis: Good    Condition at Discharge: Stable    Rehab Potential (if transferring to Rehab): Good    Recommended Labs or Other Treatments After Discharge: none    Physician Certification: I certify the above information and [facility]  is necessary for the continuing treatment of the diagnosis listed and that he requires Home Care for greater 30 days.      Update Admission H&P: No change in H&P    PHYSICIAN SIGNATURE:  Electronically signed by Sergio Valerio MD on 5/24/22 at 12:56 PM EDT

## 2022-05-24 NOTE — CARE COORDINATION
Chart reviewed and met w/ pt to continue discharge planning. CM introduced self and explained role. Per previous CM conversation with pt, pt was agreeable to [place] [address] and needed to pick facility. Pt states he would like [facility] as he has family members that work there. 11:30 AM Referral called to [address] [place] [address] at this time. Pt will need an inpatient consult to [place] [address] needs for nursing, PT & OT please.

## 2022-05-24 NOTE — DISCHARGE SUMMARY
Discharge Summary    Name:  Ismael Causey /Age/Sex: 1945  (42 y.o. male)   MRN & CSN:  [id number] & [id number] Admission Date/Time: 2022 10:36 PM   Attending:  Lalo Mansfield MD Discharging Physician: Lalo Mansfield MD     Hospital Course:     Ismael Causey is a 68 y.o. male with pmh of lung cancer, rectal cancer, COPD, chronic hypoxic respiratory failure on 2 L nocturnal oxygen, who presents with right groin pain . Patient with history of PVD (peripheral vascular disease) [facility])        Plan:  Maverick Hooper a 68 y. o. male who presents with right groin/thigh pain     Right thigh /groin pain:For 3 days in setting of peripheral vascular disease  CT angio done at [facility] showed superficial femoral artery occlusion,however has collateral circulation  DONAVAN index ordered, however unable to get done inpatient  Discussed with CTS, states patient has no ischemia of leg and can follow-up with them as outpatient to get the DONAVAN done. Recommends just baby aspirin at discharge. Seen by Ortho did not think is Ortho related   Doppler of right lower extremity done and showed questionable minimal nonocclusive acute common femoral vein DVT and mod non occlusive acute popliteal vein DVT. Patient started on Lovenox therapeutic dose. He was on heparin drip from admission to and transitioned to lovenox. Seen by heme/onc and switched to eliquis. CT chest rule out PE was negative. PT /OT eval.Was able to ambulate  DC home on Eliquis.       Hypotension- resolved  BP down to 80 over 40s . Home metoprolol on hold and he has HCTZ ordered by his PCP for leg swelling but he said he has not started it. Has not been given here too. Responded to 500 cc fluid bolus and BP now stable. Labs unremarkable with normal renal function, WBC normal and procalcitonin low. UA low.   Gentle hydration overnight given he has received contrast.  BP now normal     Malignant neoplasm of lower lobe of right lung -Follows with Dr. Anthony Bone to be in remission. Rectal cancer : Needs follow-up colonoscopy. Chronic bronchitis/copd : Continue as needed nebs. Hyperlipidemia, mixed : Continue statin  Chronic hypoxic respiratory failure: On 2 L continuous. Same here  Hypothyroidism due to acquired atrophy of thyroid : Continue Synthroid 75 mcg daily. Check TSH. Anemia: Mild. Stable. The patient expressed appropriate understanding of and agreement with the discharge recommendations, medications, and plan. Consults this admission:  IP CONSULT TO ORTHOPEDIC SURGERY  IP CONSULT TO [address] [place] [address] [place] CONSULT TO ONCOLOGY  IP CONSULT TO PULMONOLOGY  IP CONSULT TO SPIRITUAL SERVICES  IP CONSULT TO HOME CARE NEEDS      Discharge Instruction:   Handoff to PCP:     Disposition: Discharged to:   [x]Home, []C, []SNF, []Acute Rehab, []Hospice   Condition on discharge: Stable    Discharge Medications:        Medication List      START taking these medications    apixaban 5 MG Tabs tablet  Commonly known as: Eliquis  Take 2 tablets by mouth 2 times daily for 6 days Decrease to 1 tab po bid after 6 days. aspirin 81 MG chewable tablet  Take 1 tablet by mouth daily  Start taking on: May 25, 2022     metoprolol succinate 25 MG extended release tablet  Commonly known as:  Toprol XL  Take 1 tablet by mouth daily        CONTINUE taking these medications    albuterol sulfate  (90 Base) MCG/ACT inhaler  Inhale 2 puffs into the lungs every 6 hours as needed for Wheezing or Shortness of Breath     ALBUTEROL-IPRATROPIUM IN     atorvastatin 10 MG tablet  Commonly known as: LIPITOR  Take 1 tablet by mouth daily     Breztri Aerosphere 160-9-4.8 MCG/ACT Aero  Generic drug: Budeson-Glycopyrrol-Formoterol  Inhale 2 puffs into the lungs daily daily     levothyroxine 75 MCG tablet  Commonly known as: SYNTHROID  TAKE 1 TABLET BY MOUTH EVERY DAY     nicotine 21 MG/24HR  Commonly known as: NICODERM CQ  Place 1 patch onto the skin daily OXYGEN     PRESERVISION AREDS PO        STOP taking these medications    hydroCHLOROthiazide 12.5 MG capsule  Commonly known as: Microzide     metoprolol tartrate 25 MG tablet  Commonly known as: LOPRESSOR           Where to Get Your Medications      These medications were sent to [facility] [address] [facility]    Phone: [phone]   apixaban 5 MG Tabs tablet  aspirin 81 MG chewable tablet  metoprolol succinate 25 MG extended release tablet         Objective Findings at Discharge:       BMP/CBC  Recent Labs     05/22/22  1004 05/23/22  0847 05/24/22  0713    140 139   K 3.9 4.1 4.1   CL 98* 103 103   CO2 25 26 27   BUN 10 6 7   CREATININE 0.6* 0.6* 0.6*   WBC 6.4 5.1 5.0   HCT 37.0* 36.2* 34.7*    283 287           Additional Information: Patient seen and examined day of discharge.  For more information regarding patient's care please contact [facility] records [phone]    Discharge Time of 35 minutes    Electronically signed by Lalo Mansfield MD on 5/24/2022 at 12:59 PM

## 2022-05-24 NOTE — PROGRESS NOTES
Reviewed discharge paperwork with patient and his wife. Both are alert and oriented and verbalize understanding. Staff brought patient downstairs to [address] in a wheelchair. Patients daughter providing private transportation.

## 2022-05-25 ENCOUNTER — CARE COORDINATION (OUTPATIENT)
Dept: CASE MANAGEMENT | Age: 77
End: 2022-05-25

## 2022-05-25 DIAGNOSIS — I73.9 PVD (PERIPHERAL VASCULAR DISEASE) (HCC): Primary | ICD-10-CM

## 2022-05-25 PROCEDURE — 1111F DSCHRG MED/CURRENT MED MERGE: CPT | Performed by: INTERNAL MEDICINE

## 2022-05-26 ENCOUNTER — TELEPHONE (OUTPATIENT)
Dept: INTERNAL MEDICINE CLINIC | Age: 77
End: 2022-05-26

## 2022-05-26 NOTE — TELEPHONE ENCOUNTER
Yrn 45 Transitions Initial Follow Up Call    Outreach made within 2 business days of discharge: yes    Patient: Emma Renteria Patient : 1945   MRN: [id number]  Reason for Admission: There are no discharge diagnoses documented for the most recent discharge. Discharge Date: 22       Spoke with: Patient    Discharge department/facility: [facility]    TCM Interactive Patient Contact:  Was patient able to fill all prescriptions: yes  Was patient instructed to bring all medications to the follow-up visit: yes  Is patient taking all medications as directed in the discharge summary?  yes  Does patient understand their discharge instructions: yes  Does patient have questions or concerns that need addressed prior to 7-14 day follow up office visit: no    Scheduled appointment with PCP within 7-14 days    Follow Up  Future Appointments   Date Time Provider Andre Oakes   2022 12:45 PM Sue Marcial MD [facility] SPR HRT  AFL SPR HRT   2022  2:00 PM Jomar Lozoya MD [facility] E S IM [facility]   2022 11:00 AM SRMZ, MED ONC NURSE [facility] MED ONC [facility]   2022 11:15 AM Bob Mclean MD [facility] CC [facility]   2022  9:30 AM Bob Mclean MD [facility] CC [facility]   2022  9:30 AM Pablo Craig MD [facility] PULM [facility]   2022  8:00 AM Jomar Lozoya MD [facility] E S IM [facility]       Olamide Pham MA

## 2022-05-27 ENCOUNTER — CARE COORDINATION (OUTPATIENT)
Dept: CASE MANAGEMENT | Age: 77
End: 2022-05-27

## 2022-05-27 NOTE — CARE COORDINATION
Yrn 45 Transitions Initial Follow Up Call    Call within 2 business days of discharge: Yes 22    Patient: Bryan Sim Patient : 1945   MRN: [id number]  Reason for Admission: Rt Groin/Thigh Pain  Discharge Date: 22 RARS: Readmission Risk Score: 14.2 ( )    Last Discharge [facility]       Complaint Diagnosis Description Type Department Provider    22  COPD, severe ([address] Admission (Discharged) Jarrett Krishnamurthy MD        Non-face-to-face services provided:  Obtained and reviewed discharge summary and/or continuity of care documents    Care Transitions 24 Hour Call    Schedule Follow Up Appointment with PCP: Damion Lopez you have a copy of your discharge instructions?: Yes  Do you have all of your prescriptions and are they filled?: Yes  Have you been contacted by a [place] Pharmacist?: No  Have you scheduled your follow up appointment?: No (Comment: dtr to scheduled)  Patient DME: Mildred Costak cane, Shower chair, Commode  Patient Home Equipment: Oxygen, Nebulizer  Do you have support at home?: Alone  Do you feel like you have everything you need to keep you well at home?: Yes  Are you an active caregiver in your home?: No  Care Transitions Interventions     Other Therapy Services: Declined     Senior Services: Declined      Smoking Cessation: Declined    Disease Specific Clinic: Declined  Social Work: Declined         Future Appointments   Date Time Provider Andre Oakes   2022 12:45 PM Santana Lu MD AFL SPR HRT  AFL SPR HRT   2022  2:00 PM MD Sam Austin6 [facility] [place] [place] E S IM [facility]   2022 11:00 AM SRMZ, MED ONC NURSE [facility] MED ONC [facility]   2022 11:15 AM MD Estuardo Balbuena CC [facility]   2022  9:30 AM MD Estuardo Balbuena CC [facility]   2022  9:30 AM MD Estuardo Saucedo PULM [facility]   2022  8:00 AM MD Estuardo Austin E S IM [facility]     Challenges to be reviewed by the provider   Additional needs identified to be addressed with provider: jose         Method of communication with provider : n/a    33 [facility]: [facility]  RARS: 15    Was this a readmission? No  Patient stated reason for admission: Pain  Patients top risk factors for readmission: medical condition-PVD, SFA Occlusion, Lung Cancer    Care Transition Nurse (CTN) contacted patient and Dtr by telephone to perform post hospital discharge assessment. Verified name and  with patient as identifiers. Provided introduction to self, and explanation of the CTN role. Phone Assessment: Patient reports doing well since home, states \"Im better\". Reports groin pain significantly improved. Reports bile equal color, temp, sensation. Ambulating w/ ease. No claudication. Patient is active smoker consuming 1 1/2 packs + per day and 3-4 beers daily. Stressed importance of no smoking w/ PVD, Lung Cancer and discussed risks. Discussed benefits of [employer], Patient declined. Encouraged 1800QUITNOW. Advised no smoking near or with O2 on, discussed risks. Advised heart healthy diet. Reports b&b wnl. [facility] verified. AVS/Meds: Confirmed copy of AVS received, reviewed with Daughter. Confirmed Patient obtained and is taking Eliquis, Asa, Toprol  as directed. Med education provided, questions answered, verbalizes understanding. Stressed importance of med compliance. 1111F medication review completed with Daughter . Advised obtaining 90 day supply of routine, prn meds. Advised contacting pharmacy/md for refill needs. Resource Need Assessment:   Living Arrangements: lives alone; family in/out of home daily  ADLS:independent, drives   DME:none, denies need   Transportation: self or family  [facility]:[address], denies need   Referrals Made per CTN with Patient/Family Approval: [facility] Follow Up Appointments: Discussed importance of 7 day hospital follow up appointment for continuity of care. Transportation confirmed for the above appts. Dtr prefers to set up appts w/ Dr Shikha Milner, Dr Joel Garcia. [id number] Education:   Educated patient about risk for severe COVID-19 due to risk factors according to CDC guidelines. Reviewed red flag symptoms with patient who verbalized understanding. Discussed COVID vaccination status: Yes--Pfizer x 3. Education provided on COVID-19 vaccination as appropriate, advised to speak w/ PCP re: vaccine, booster. Discussed exposure protocols and quarantine with CDC Guidelines. Patient was given an opportunity to verbalize any questions and concerns and agrees to contact CTN or health care provider for questions related to their healthcare. Reviewed, advised Covid19 preventative measures including mask covering nose/mouth, social distancing, hand washing. Advance Care Planning  Reports Advanced Directives up to date, reflecting current wishes . Encouraged to place on file w/ PCP, [facility]. Healthcare Decision Maker:    Primary Decision Maker: Vero Mayor - [phone]    Primary Decision Maker: Hernan Lopez - Child - [phone]    Secondary Decision Maker: Cyrus Sam - Child - [phone]    Advised to contact PCP / Larry Jenkins 24/7 re: any health concerns for early outpatient intervention in an effort to avoid hospitalization. Discussed benefits of WIC, Urgent Care. Advised 911 if noting acute distress. CTN provided contact information. Plan for follow-up call in 5-7 days based on severity of symptoms and risk factors.   Plan for next call: sx, f/u on appt, f/u on c intake visit, pvd ed, smoking cessation    [address] [place] [address] [phone]

## 2022-05-27 NOTE — CARE COORDINATION
Yrn 45 Transitions Follow Up Call    2022    Patient: Belen Valente  Patient : 1945   MRN: [id number]  Reason for Admission: Rt Groin/Thigh Pain  Discharge Date: 22 RARS:     33 [facility]: [facility]  RARS: 14    Incoming message received from Patient's Dtr who reports she scheduled the following appts w/ transportation confirmed:  22 12:45pm Dr Ilana Sharp  22 2:00pm Dr Danuta Trimble  22 11:15am Dr Dereje Avery RN

## 2022-06-01 ENCOUNTER — CARE COORDINATION (OUTPATIENT)
Dept: CASE MANAGEMENT | Age: 77
End: 2022-06-01

## 2022-06-01 PROBLEM — I82.439 DEEP VEIN THROMBOSIS (DVT) OF POPLITEAL VEIN (HCC): Status: ACTIVE | Noted: 2022-06-01

## 2022-06-03 ENCOUNTER — CARE COORDINATION (OUTPATIENT)
Dept: CASE MANAGEMENT | Age: 77
End: 2022-06-03

## 2022-06-03 NOTE — CARE COORDINATION
[facility]    6/3/2022    Patient: Yady Jacobs  Patient : 1945   MRN: [id number]  Reason for Admission: COPD  Discharge Date: 22 RARS: Readmission Risk Score: 14.2 ( )    Care Transitions Follow Up Call: Patient states he is doing Pretty good. Denies wheezing, cough, chest pain with inspiration or expiration, fatigue, fever or chills. Uses Oxygen 2/L all night with activity during the day only. SpO2 between 93%-94% . Home care coming in tomorrow. Patient Denies Transportation, Home, or Medication needs or concerns at this time. Patient had Transitional Follow up appointment. 22 PCP  Patient has no needs or concerns for writer at this time. Instructed patient that this is the Final Transition Call and to call their Specialist/PCP for any problems or issues that may occur. Expresses understanding. Troy Larry LPN    [phone]  [address] [place] [address] / [place] [address] Coordinator       Needs to be reviewed by the provider   Additional needs identified to be addressed with provider No  none             Method of communication with provider : none      Care Transition Nurse (CTN) contacted the patient by telephone to follow up after admission on 2022. Verified name and  with patient as identifiers. Discussed follow-up appointments. If no appointment was previously scheduled, appointment scheduling offered: Yes   Is follow up appointment scheduled within 7 days of discharge? Yes    Advance Care Planning:   Does patient have an Advance Directive:  reviewed and current. CTN reviewed discharge instructions, medical action plan and red flags with patient and discussed any barriers to care and/or understanding of plan of care after discharge. Discussed appropriate site of care based on symptoms and resources available to patient including: PCP  When to call [address]    The patient agrees to contact the PCP office for questions related to their healthcare. Patients top risk factors for readmission: lack of knowledge about disease  medical condition-vvCOPD  medication management  Interventions to address risk factors: Obtained and reviewed discharge summary and/or continuity of care documents    [facility] follow up appointment(s): 6/1/2022 PCP    CTN provided contact information for future needs. No further follow-up call identified based on severity of symptoms and risk factors. Plan for next call:              Care Transitions Subsequent and Final Call    Subsequent and Final Calls  Care Transitions Interventions  Other Interventions:            Follow Up  Future Appointments   Date Time Provider Andre Oakes   6/6/2022  2:00 PM MD Sam Dorsey6 [place] E S IM [facility]   6/8/2022 10:00 AM [facility] Schedule [facility] SPR HRT  AFL SPR HRT   6/15/2022  1:30 PM Calli Eng MD AFL SPR HRT  AFL SPR HRT   6/21/2022 11:00 AM SHERIN, MED ONC NURSE [facility] MED ONC [place]   6/21/2022 11:15 AM MD Estuardo Silverio CC MMA   9/16/2022  9:30 AM MD Estuardo Silverio CC [facility]   9/20/2022  9:30 AM MD Estuardo Murillo PULM [facility]   11/11/2022  8:00 AM MD Estuardo Dorsey E S IM [facility]       Nevaeh Bolton LPN

## 2022-06-06 ENCOUNTER — OFFICE VISIT (OUTPATIENT)
Dept: INTERNAL MEDICINE CLINIC | Age: 77
End: 2022-06-06
Payer: MEDICARE

## 2022-06-06 VITALS
HEART RATE: 90 BPM | RESPIRATION RATE: 20 BRPM | DIASTOLIC BLOOD PRESSURE: 70 MMHG | OXYGEN SATURATION: 95 % | SYSTOLIC BLOOD PRESSURE: 135 MMHG | WEIGHT: 112 LBS | BODY MASS INDEX: 17.54 KG/M2

## 2022-06-06 DIAGNOSIS — I82.431 ACUTE DEEP VEIN THROMBOSIS (DVT) OF POPLITEAL VEIN OF RIGHT LOWER EXTREMITY (HCC): Primary | ICD-10-CM

## 2022-06-06 DIAGNOSIS — I73.9 PVD (PERIPHERAL VASCULAR DISEASE) WITH CLAUDICATION (HCC): ICD-10-CM

## 2022-06-06 DIAGNOSIS — C34.31 MALIGNANT NEOPLASM OF LOWER LOBE OF RIGHT LUNG (HCC): ICD-10-CM

## 2022-06-06 DIAGNOSIS — Z09 HOSPITAL DISCHARGE FOLLOW-UP: ICD-10-CM

## 2022-06-06 DIAGNOSIS — J18.9 PNEUMONIA OF RIGHT LOWER LOBE DUE TO INFECTIOUS ORGANISM: ICD-10-CM

## 2022-06-06 PROCEDURE — 1111F DSCHRG MED/CURRENT MED MERGE: CPT | Performed by: INTERNAL MEDICINE

## 2022-06-06 PROCEDURE — 99495 TRANSJ CARE MGMT MOD F2F 14D: CPT | Performed by: INTERNAL MEDICINE

## 2022-06-06 RX ORDER — LEVOFLOXACIN 500 MG/1
500 TABLET, FILM COATED ORAL DAILY
Qty: 10 TABLET | Refills: 0 | Status: SHIPPED | OUTPATIENT
Start: 2022-06-06 | End: 2022-06-16

## 2022-06-06 ASSESSMENT — PATIENT HEALTH QUESTIONNAIRE - PHQ9
2. FEELING DOWN, DEPRESSED OR HOPELESS: 0
SUM OF ALL RESPONSES TO PHQ QUESTIONS 1-9: 0
DEPRESSION UNABLE TO ASSESS: FUNCTIONAL CAPACITY MOTIVATION LIMITS ACCURACY
1. LITTLE INTEREST OR PLEASURE IN DOING THINGS: 0
SUM OF ALL RESPONSES TO PHQ QUESTIONS 1-9: 0
SUM OF ALL RESPONSES TO PHQ9 QUESTIONS 1 & 2: 0

## 2022-06-06 NOTE — PROGRESS NOTES
Post-Discharge Transitional Care  Follow Up      Blanka Aguirre   YOB: 1945    Date of Office Visit:  6/6/2022  Date of Hospital Admission: 5/20/22  Date of Hospital Discharge: 5/24/22  Risk of hospital readmission (high >=14%. Medium >=10%) :Readmission Risk Score: 14.2 ( )      Care management risk score Rising risk (score 2-5) and Complex Care (Scores >=6): 2     Non face to face  following discharge, date last encounter closed (first attempt may have been earlier): 5/27/2022  7:26 AM    Call initiated 2 business days of discharge: Yes    ASSESSMENT/PLAN:   Acute deep vein thrombosis (DVT) of popliteal vein of right lower extremity ([facility] [address] [facility]- on eliquis and for f/u w Dr Celestine Wray for recheck u/s and also Dr Rosangela Umana to determine duration of therapy  -     HI DISCHARGE MEDS RECONCILED W/ CURRENT OUTPATIENT MED LIST  PVD (peripheral vascular disease) with claudication ([address]- for f/u Dr Celestine Wray and w/o complete occlution  -     HI DISCHARGE MEDS RECONCILED W/ CURRENT OUTPATIENT MED LIST  Pneumonia of right lower lobe due to infectious organism-start abx and also for referral Pulmonary, check cxr  -     XR CHEST STANDARD (2 VW); Future  -     Paolo Zaragoza MD, Pulmonology, [place]  -     levoFLOXacin (LEVAQUIN) 500 MG tablet; Take 1 tablet by mouth daily for 10 days, Disp-10 tablet, R-0Normal  -     [facility] discharge follow-up  -     HI DISCHARGE MEDS RECONCILED W/ CURRENT OUTPATIENT MED LIST  RLL lung CA, for cont f/u oncology as noted    Medical Decision Making: high complexity  Return in about 4 weeks (around 7/4/2022) for routine. On this date 6/6/2022 I have spent 25 minutes reviewing previous notes, test results and face to face with the patient discussing the diagnosis and importance of compliance with the treatment plan as well as documenting on the day of the visit.        Subjective:   HPI:  Follow up of JOSH KIM - ROHITH problems/diagnosis(es): dvt and PAD r leg, lung CA    Inpatient course: Discharge summary reviewed- see chart. Interval history/Current status: reviewed records, R leg pain initially thought to be fr vasc occl of PAD so transferred to [facility]. vasc surgery then did not think was fr complete occl, later dx'd w R Popl and fem DVT so started anticoagulation  Also dx'd w pneumonia and a pl effusion. Saw Dr Snehal Phillip and Dr Mack Sicard in [place] [address] but not Pul.  WAS NOT DISCHARGED ON ABX. This is the same side as prior lung CA. Denies further leg pain, is still coughing but no fever or chills. sats ok on home O2    Patient Active Problem List   Diagnosis    Rectal cancer ([address]    Atherosclerosis of aortic arch (HCC)    Hyperlipidemia, mixed    S/P colonoscopy    PVD (peripheral vascular disease) with claudication ([address]    Essential hypertension    Subclavian artery stenosis, right (HCC)    Pulmonary nodule, left    Carotid stenosis, bilateral    Pulmonary nodule, right    SOB (shortness of breath) on exertion    Primary lung squamous cell carcinoma, right ([address]    Malignant neoplasm of lower lobe of right lung (HCC)    SCC (squamous cell carcinoma of lung), right ([address]    Hypothyroidism due to acquired atrophy of thyroid    Respiratory distress    Pneumothorax on right    Chronic respiratory failure with hypoxia (HCC)    COPD, severe ([address]    PVD (peripheral vascular disease) ([address]    Deep vein thrombosis (DVT) of popliteal vein ([address]       Medications listed as ordered at the time of discharge from hospital     Medication List          Accurate as of June 6, 2022  2:37 PM. If you have any questions, ask your nurse or doctor.             CONTINUE taking these medications    albuterol sulfate  (90 Base) MCG/ACT inhaler  Inhale 2 puffs into the lungs every 6 hours as needed for Wheezing or Shortness of Breath     ALBUTEROL-IPRATROPIUM IN     apixaban 5 MG Tabs tablet  Commonly known as: Eliquis  Take 2 tablets by mouth 2 times daily for 6 days Decrease to 1 tab po bid after 6 days. aspirin 81 MG chewable tablet  Take 1 tablet by mouth daily     atorvastatin 10 MG tablet  Commonly known as: LIPITOR  Take 1 tablet by mouth daily     Breztri Aerosphere 160-9-4.8 MCG/ACT Aero  Generic drug: Budeson-Glycopyrrol-Formoterol  Inhale 2 puffs into the lungs daily daily     levothyroxine 75 MCG tablet  Commonly known as: SYNTHROID  TAKE 1 TABLET BY MOUTH EVERY DAY     metoprolol succinate 25 MG extended release tablet  Commonly known as:  Toprol XL  Take 1 tablet by mouth daily     nicotine 21 MG/24HR  Commonly known as: [address] [facility] 1 patch onto the skin daily     OXYGEN     PRESERVISION AREDS PO              Medications marked \"taking\" at this time  Outpatient Medications Marked as Taking for the 6/6/22 encounter (Office Visit) with Jomar Lozoya MD   Medication Sig Dispense Refill    aspirin 81 MG chewable tablet Take 1 tablet by mouth daily 30 tablet 3    metoprolol succinate (TOPROL XL) 25 MG extended release tablet Take 1 tablet by mouth daily 30 tablet 3    albuterol sulfate  (90 Base) MCG/ACT inhaler Inhale 2 puffs into the lungs every 6 hours as needed for Wheezing or Shortness of Breath 1 each 5    atorvastatin (LIPITOR) 10 MG tablet Take 1 tablet by mouth daily 90 tablet 1    BREZTRI AEROSPHERE 160-9-4.8 MCG/ACT AERO Inhale 2 puffs into the lungs daily daily 1 each 5    levothyroxine (SYNTHROID) 75 MCG tablet TAKE 1 TABLET BY MOUTH EVERY DAY 90 tablet 1    nicotine (NICODERM CQ) 21 MG/24HR Place 1 patch onto the skin daily 30 patch 3    Ipratropium-Albuterol (ALBUTEROL-IPRATROPIUM IN) Inhale 2 puffs into the lungs 4 times daily as needed       OXYGEN Inhale 2 L into the lungs nightly       Multiple Vitamins-Minerals (PRESERVISION AREDS PO) Take by mouth daily          Medications patient taking as of now reconciled against medications ordered at time of hospital discharge: Yes    A comprehensive review of systems was negative except for what was noted in the HPI. Objective:    /70   Pulse 90   Resp 20   Wt 112 lb (50.8 kg)   SpO2 95%   BMI 17.54 kg/m²   General Appearance: alert and oriented to person, place and time, well developed and well- nourished, in no acute distress  Skin: warm and dry, no rash or erythema  Head: normocephalic and atraumatic  Eyes: pupils equal, round, and reactive to light, extraocular eye movements intact, conjunctivae normal  Neck: supple and non-tender without mass, no thyromegaly or thyroid nodules, no cervical lymphadenopathy  Pulmonary/Chest: DIMINISHED BS BILAT AND ALSO DULLNESS TO PERCUSSION RLL  Cardiovascular: normal rate, regular rhythm, normal S1 and S2, no murmurs, rubs, clicks, or gallops, distal pulses intact, no carotid bruits  Abdomen: soft, non-tender, non-distended, normal bowel sounds, no masses or organomegaly  Extremities: no cyanosis, clubbing or edema  Musculoskeletal: normal range of motion, no joint swelling, deformity or tenderness  Neurologic: reflexes normal and symmetric, no cranial nerve deficit, gait, coordination and speech normal      An electronic signature was used to authenticate this note.   --Tad Silva MD

## 2022-06-07 ENCOUNTER — HOSPITAL ENCOUNTER (OUTPATIENT)
Dept: GENERAL RADIOLOGY | Age: 77
Discharge: HOME | End: 2022-06-07
Payer: MEDICARE

## 2022-06-07 ENCOUNTER — HOSPITAL ENCOUNTER (OUTPATIENT)
Age: 77
Discharge: HOME | End: 2022-06-07
Payer: MEDICARE

## 2022-06-07 DIAGNOSIS — J18.9 PNEUMONIA OF RIGHT LOWER LOBE DUE TO INFECTIOUS ORGANISM: ICD-10-CM

## 2022-06-07 PROCEDURE — 71046 X-RAY EXAM CHEST 2 VIEWS: CPT

## 2022-06-14 ENCOUNTER — OFFICE VISIT (OUTPATIENT)
Dept: PULMONOLOGY | Age: 77
End: 2022-06-14
Payer: MEDICARE

## 2022-06-14 VITALS
BODY MASS INDEX: 17.58 KG/M2 | HEART RATE: 86 BPM | HEIGHT: 67 IN | SYSTOLIC BLOOD PRESSURE: 127 MMHG | DIASTOLIC BLOOD PRESSURE: 70 MMHG | OXYGEN SATURATION: 96 % | WEIGHT: 112 LBS

## 2022-06-14 DIAGNOSIS — C34.91 SCC (SQUAMOUS CELL CARCINOMA OF LUNG), RIGHT (HCC): ICD-10-CM

## 2022-06-14 DIAGNOSIS — J18.9 PNEUMONIA OF BOTH LOWER LOBES DUE TO INFECTIOUS ORGANISM: ICD-10-CM

## 2022-06-14 DIAGNOSIS — J44.9 COPD, SEVERE (HCC): ICD-10-CM

## 2022-06-14 DIAGNOSIS — R06.02 SOB (SHORTNESS OF BREATH) ON EXERTION: ICD-10-CM

## 2022-06-14 DIAGNOSIS — F17.210 CIGARETTE SMOKER: ICD-10-CM

## 2022-06-14 PROCEDURE — G8419 CALC BMI OUT NRM PARAM NOF/U: HCPCS | Performed by: INTERNAL MEDICINE

## 2022-06-14 PROCEDURE — 99214 OFFICE O/P EST MOD 30 MIN: CPT | Performed by: INTERNAL MEDICINE

## 2022-06-14 PROCEDURE — G8427 DOCREV CUR MEDS BY ELIG CLIN: HCPCS | Performed by: INTERNAL MEDICINE

## 2022-06-14 PROCEDURE — 3023F SPIROM DOC REV: CPT | Performed by: INTERNAL MEDICINE

## 2022-06-14 ASSESSMENT — ENCOUNTER SYMPTOMS
ABDOMINAL DISTENTION: 0
EYE ITCHING: 0
ABDOMINAL PAIN: 0
SHORTNESS OF BREATH: 1
BACK PAIN: 0
EYE DISCHARGE: 0
COUGH: 1

## 2022-06-14 NOTE — ASSESSMENT & PLAN NOTE
He has stable Squamous cell lung ca on the recent CTA with no progression  Patient to follow up with Dr. Shikha Milner for follow up CT scans

## 2022-06-14 NOTE — PROGRESS NOTES
Claudiomaldonado Winn  1945  Referring Provider: Jaye Jones MD    Subjective:     Chief Complaint   Patient presents with    Follow-up     pneumonia        HPI  Fabby Hernandez is a 68 y.o. male has come back as a follow up. He was recently in the hospital for the suspected pneumonia and RLE DVT. He is on Eliquis. He is going to finish Abx tomorrow. He has a 60 pk yr smoking and still smoking 1 pk/day. He is on 2 L/min of oxygen. He had a CTA done on 05/22/22 showed:  1. No acute pulmonary emboli. 2. Severe COPD with acute bilateral lower lobe progressive bronchial   thickening and endobronchial debris with patchy bilateral lower lobe basilar   mild consolidations suggesting acute bronchopneumonia with progressive mucous   plugging on chronic bronchitis. 3. History of right squamous cell cancer with stable right lower lobe   superior segment masslike consolidation and architectural distortion.  No   definite evidence of malignant disease progression. He is being followed by Dr. Cindy Rivas. He is not on any treatment now. He has little cough, little phlegm, no hemoptysis, no loss of weight, good appetite, SOBOE. He is on Albuterol prn, Breztri. He had his flu vaccine and COVID vaccine with 1 booster. His last PFT done in Sept'21 showed very severe COPD.      Current Outpatient Medications   Medication Sig Dispense Refill    levoFLOXacin (LEVAQUIN) 500 MG tablet Take 1 tablet by mouth daily for 10 days 10 tablet 0    aspirin 81 MG chewable tablet Take 1 tablet by mouth daily 30 tablet 3    metoprolol succinate (TOPROL XL) 25 MG extended release tablet Take 1 tablet by mouth daily 30 tablet 3    albuterol sulfate  (90 Base) MCG/ACT inhaler Inhale 2 puffs into the lungs every 6 hours as needed for Wheezing or Shortness of Breath 1 each 5    atorvastatin (LIPITOR) 10 MG tablet Take 1 tablet by mouth daily 90 tablet 1    BREZTRI AEROSPHERE 160-9-4.8 MCG/ACT AERO Inhale 2 puffs into the lungs daily daily 1 each 5    levothyroxine (SYNTHROID) 75 MCG tablet TAKE 1 TABLET BY MOUTH EVERY DAY 90 tablet 1    nicotine (NICODERM CQ) 21 MG/24HR Place 1 patch onto the skin daily 30 patch 3    Ipratropium-Albuterol (ALBUTEROL-IPRATROPIUM IN) Inhale 2 puffs into the lungs 4 times daily as needed       OXYGEN Inhale 2 L into the lungs nightly       Multiple Vitamins-Minerals (PRESERVISION AREDS PO) Take by mouth daily      apixaban (ELIQUIS) 5 MG TABS tablet Take 2 tablets by mouth 2 times daily for 6 days Decrease to 1 tab po bid after 6 days. 90 tablet 1     No current facility-administered medications for this visit.        No Known Allergies    Past Medical History:   Diagnosis Date    Acute deep vein thrombosis (DVT) of right popliteal vein (HCC)     admit 5/22- Dr Zeus Trujillo and Dr Marbin Cota to determine duration of anticoagulation    Atherosclerosis of aortic arch ([facility]     NOTED ON CXR 1/2017- CONSIDER CARDIO CONSULT    COPD (chronic obstructive pulmonary disease) ([facility] [address]     follow with Dr Cookie Tony- chantix ineffective    COPD, severe ([facility] [address] 03/14/2022    Essential hypertension 01/29/2018    Full dentures     History of external beam radiation therapy 2009/2019    pelvis/anus in 2009 and right lower lobe lung mass SBRT in 2019 per Dr. Tiffanie Scott at [address] [place] [address] Hyperlipidemia, mixed     Hypertension     Hypothyroidism due to acquired atrophy of thyroid 05/11/2021    ANTIPEROXIDASE ANTIBODY NEGATIVE    On home oxygen therapy     2l/nc at night and prn    PAD (peripheral artery disease) ([facility] [address]     R leg on doppler 8/2017-- referring to Dr Magnolia Asif Primary lung squamous cell carcinoma, right ([facility] [address] 12/2018    Dr Zeus Trujillo, Dr Cookie Tony, Dr Tiffanie Scott- treated w RT,    Pulmonary nodule, left 07/2018    Rectal cancer [facility]) 2009    Dr Dewayne Pathak oncology, seen by GI at [facility]- Dr Dahlia Madden- tx with chemo and radiation     S/P colonoscopy 02/01/2017    Dr Sonia Kinney at [facility]-  diverticulosis and angioectasis, being referred to surgeon--CONSIDER RECHECK 5 YRS    SCC (squamous cell carcinoma of lung), right [facility])     Dr Jinny Pena, periodically Dr Priscilla Harvey.  had resection w Dr Al Dry Subclavian artery stenosis, right [facility])    Omid Brown Wears glasses        Past Surgical History:   Procedure Laterality Date    ANGIOPLASTY Bilateral 2017    Dr Johnathan Soto for bilat leg PVD   [place] Left 2018    Dr Flores Milton  2009    Dr Rabia Clark then needed revision at [facility]  last one 2017   [id number]  2009    LUNG BIOPSY  12/07/2018    LYMPH NODE BIOPSY  07/2009    right axilla    TONSILLECTOMY  age 11       Social History     Socioeconomic History    Marital status:      Spouse name: None    Number of children: 10    Years of education: None    Highest education level: None   Occupational History    Occupation: retired   Tobacco Use    Smoking status: Current Every Day Smoker     Packs/day: 1.00     Years: 60.00     Pack years: 60.00     Types: Cigarettes    Smokeless tobacco: Never Used   Vaping Use    Vaping Use: Former   Substance and Sexual Activity    Alcohol use: Yes     Alcohol/week: 20.0 standard drinks     Types: 20 Cans of beer per week     Comment: average\"3 beers per day\" per pt on 12/6/2018    Drug use: No    Sexual activity: None   Other Topics Concern    None   Social History Narrative    6 childrean total ; strong, supportive family. Family does grocery shopping, clean home and prepare meals. Social Determinants of Health     Financial Resource Strain: Low Risk     Difficulty of Paying Living Expenses: Not hard at all   Food Insecurity: No Food Insecurity    Worried About Running Out of Food in the Last Year: Never true    [place] of Food in the Last Year: Never true   Transportation Needs: No Transportation Needs    Lack of Transportation (Medical): No    Lack of Transportation (Non-Medical):  No   Physical Activity: Insufficiently Active  Days of Exercise per Week: 3 days    Minutes of Exercise per Session: 10 min   Stress: No Stress Concern Present    Feeling of Stress : Not at all   Social Connections: Unknown    Frequency of Communication with Friends and Family: Twice a week    Frequency of Social Gatherings with Friends and Family: Once a week    Attends Rastafari Services: Never    Active Member of Clubs or Organizations: No    Attends Club or Organization Meetings: Never    Marital Status: Not on file   Intimate Partner Violence:     Fear of Current or Ex-Partner: Not on file    Emotionally Abused: Not on file    Physically Abused: Not on file    Sexually Abused: Not on file   Housing Stability: [address] Unable to Pay for Housing in the Last Year: No    Number of [place] in the Last Year: 1    Unstable Housing in the Last Year: No       Review of Systems   Constitutional: Negative for fatigue. HENT: Negative for congestion and postnasal drip. Eyes: Negative for discharge and itching. Respiratory: Positive for cough and shortness of breath. Cardiovascular: Negative for chest pain and leg swelling. Gastrointestinal: Negative for abdominal distention and abdominal pain. Endocrine: Negative for cold intolerance and heat intolerance. Genitourinary: Negative for enuresis and frequency. Musculoskeletal: Negative for arthralgias and back pain. Allergic/Immunologic: Negative for environmental allergies and food allergies. Neurological: Negative for light-headedness and headaches. Hematological: Negative for adenopathy. Psychiatric/Behavioral: Negative for agitation and behavioral problems. Objective:   /70 (Site: Left Upper Arm)   Pulse 86   Ht 5' 7\" (1.702 m)   Wt 112 lb (50.8 kg)   SpO2 96%   BMI 17.54 kg/m²   Body mass index is 17.54 kg/m².   Sleep Medicine 7/2/2018   Sitting and reading 0   Watching TV 1   Sitting, inactive in a public place (e.g. a theatre or a meeting) 1   As a passenger in a car for an hour without a break 1   Lying down to rest in the afternoon when circumstances permit 3   Sitting and talking to someone 0   Sitting quietly after a lunch without alcohol 1   In a car, while stopped for a few minutes in traffic 0   Total score 7   Neck circumference (Inches) 16     Mallampati 2    Physical Exam  Vitals reviewed. Constitutional:       Appearance: Normal appearance. HENT:      Head: Normocephalic and atraumatic. Nose: Nose normal.      Mouth/Throat:      Mouth: Mucous membranes are moist.   Eyes:      Extraocular Movements: Extraocular movements intact. Pupils: Pupils are equal, round, and reactive to light. Cardiovascular:      Rate and Rhythm: Normal rate and regular rhythm. Pulses: Normal pulses. Heart sounds: Normal heart sounds. Pulmonary:      Effort: Pulmonary effort is normal.      Breath sounds: Normal breath sounds. Abdominal:      General: Abdomen is flat. Palpations: Abdomen is soft. Musculoskeletal:         General: Normal range of motion. Cervical back: Normal range of motion and neck supple. Skin:     General: Skin is warm and dry. Neurological:      General: No focal deficit present. Mental Status: He is alert and oriented to person, place, and time. Psychiatric:         Mood and Affect: Mood normal.         Behavior: Behavior normal.         Radiology:   1. No acute pulmonary emboli. 2. Severe COPD with acute bilateral lower lobe progressive bronchial   thickening and endobronchial debris with patchy bilateral lower lobe basilar   mild consolidations suggesting acute bronchopneumonia with progressive mucous   plugging on chronic bronchitis. 3. History of right squamous cell cancer with stable right lower lobe   superior segment masslike consolidation and architectural distortion.  No   definite evidence of malignant disease progression.          Assessment and Plan     Problem List        Respiratory Pneumonia of both lungs due to infectious organism      He has been treated with the Abx and he has minimal symptoms now         Relevant Medications    Ipratropium-Albuterol (ALBUTEROL-IPRATROPIUM IN)    albuterol sulfate  (90 Base) MCG/ACT inhaler    BREZTRI AEROSPHERE 160-9-4.8 MCG/ACT AERO    levoFLOXacin (LEVAQUIN) 500 MG tablet    SCC (squamous cell carcinoma of lung), right (HCC)      He has stable Squamous cell lung ca on the recent CTA with no progression  Patient to follow up with Dr. Kevni Dorantes for follow up CT scans         Relevant Medications    aspirin 81 MG chewable tablet    COPD, severe ([address]      Advised to quit smoking  C/w Inhalers  C/w Oxygen  Get yearly flu vaccine  PFT in Sept'22         Relevant Medications    Ipratropium-Albuterol (ALBUTEROL-IPRATROPIUM IN)    albuterol sulfate  (90 Base) MCG/ACT inhaler    BREZTRI AEROSPHERE 160-9-4.8 MCG/ACT AERO    nicotine (NICODERM CQ) 21 MG/24HR    Other Relevant Orders    Full PFT Study With Bronchodilator       Other    Cigarette smoker      Advised to quit smoking         SOB (shortness of breath) on exertion      Advised to quit smoking  C/w Inhalers  C/w Oxygen  PFT in Sept;22                    Follow-Up:    Return in about 14 weeks (around 9/20/2022) for PFT.      Progress notes sent to the referring Provider    Trey Lindsey MD MD  6/14/2022  9:40 AM

## 2022-06-21 ENCOUNTER — OFFICE VISIT (OUTPATIENT)
Dept: ONCOLOGY | Age: 77
End: 2022-06-21
Payer: MEDICARE

## 2022-06-21 ENCOUNTER — HOSPITAL ENCOUNTER (OUTPATIENT)
Dept: INFUSION THERAPY | Age: 77
Discharge: HOME | End: 2022-06-21

## 2022-06-21 VITALS
BODY MASS INDEX: 17.58 KG/M2 | TEMPERATURE: 98.3 F | DIASTOLIC BLOOD PRESSURE: 73 MMHG | HEART RATE: 93 BPM | SYSTOLIC BLOOD PRESSURE: 179 MMHG | OXYGEN SATURATION: 94 % | WEIGHT: 112 LBS | HEIGHT: 67 IN

## 2022-06-21 DIAGNOSIS — C34.31 MALIGNANT NEOPLASM OF LOWER LOBE OF RIGHT LUNG (HCC): Primary | ICD-10-CM

## 2022-06-21 PROCEDURE — 4004F PT TOBACCO SCREEN RCVD TLK: CPT | Performed by: INTERNAL MEDICINE

## 2022-06-21 PROCEDURE — G8427 DOCREV CUR MEDS BY ELIG CLIN: HCPCS | Performed by: INTERNAL MEDICINE

## 2022-06-21 PROCEDURE — 1123F ACP DISCUSS/DSCN MKR DOCD: CPT | Performed by: INTERNAL MEDICINE

## 2022-06-21 PROCEDURE — 99214 OFFICE O/P EST MOD 30 MIN: CPT | Performed by: INTERNAL MEDICINE

## 2022-06-21 PROCEDURE — G8419 CALC BMI OUT NRM PARAM NOF/U: HCPCS | Performed by: INTERNAL MEDICINE

## 2022-06-21 PROCEDURE — 1111F DSCHRG MED/CURRENT MED MERGE: CPT | Performed by: INTERNAL MEDICINE

## 2022-06-21 NOTE — PROGRESS NOTES
Patient Name: Pedro Arizmendi  Patient : 1945  Patient MRN: [id number]     Primary Oncologist: Adamaris Crooks MD  Referring Provider: April Concepcion MD     Date of Service: 2022      Chief Complaint:   Chief Complaint   Patient presents with    Follow-up     Patient Active Problem List:     Malignant neoplasm of lower lobe of right lung      SCC (squamous cell carcinoma of lung), right     HPI:   Mr. Stella Olivera is a 59-year-old very pleasant gentleman with medical history significant for hypertension, COPD, hyperlipidemia, peripheral arterial disease, coronary artery disease, history of anal canal squamous cell carcinoma, status post chemoradiation therapy (completed in 2009), initially referred to me on 2018 for evaluation of clinical stage IA1 right lower lobe squamous cell lung cancer. He stated that he has screening low dose CT scan of the chest (ordered by Dr. Claude Escoto) on 2018 and it showed 10 mm ground glass left lower lobe nodule, which is new since . Subsequently he had PET/CT scan on 18 and it showed metabolically active 5 mm nodule in the right lower lobe, potentially malignant. There was no FDG activity associated with the ground glass left lower lobe nodule seen on prior CT scan. Repeat CT scan on 18 showed increasing irregular right lower lobe nodule 7.9 x 7.2 mm, considered neoplastic until proven otherwise. Resolution of left lower lobe ground glass density. He subsequently underwent CT guided biopsy of right lower lobe lung mass on 18 and final pathology was consistent with squamous cell carcinoma. He was subsequently referred to me for evaluation. PET/CT scan done on 2018 showed slightly increased size and increased uptake of the biopsy-proven malignancy in the right lower lobe. No findings of metastatic disease.     MRI of the brain with and without contrast done on 1/3/19 showed No acute intracranial abnormality. No intracranial metastatic disease. 2. Age-related parenchymal volume loss and mild chronic microvascular ischemic changes. He was seen by cardio thoracic surgeon and they don't think he is a candidate for surgery. He was subsequently seen by Dr. Berkley Landin and he was treated with stereotactic body radiation therapy between February 19 and February 26, 2019. CT chest done on March 4, 2022 showed slightly increasing consolidation within the superior segment of the right lower lobe with adjacent trace pleural effusion and volume loss likely evolving posttreatment changes/fibrosis. Slight interval decrease in size of a 6 mm groundglass nodule in the anterior inferior right upper lobe with interval resolution of nodule in the left upper lobe. He developed nonocclusive acute common femoral veing DVT, moderate non occlusive acute popliteal vein DVT on 5/22/22. He has been on eliquis since then. I believe his VTE is likely secondary to immobility related to dyspnea associated with COPD. CT chest done on 5/22/2022 showed stable right lower lobe superior segment masslike consolidation and architectural distortion. No definite evidence of malignant disease progression. Acute bilateral lower lobe progressive bronchial thickening and endobronchial debris with patchy bilateral lower lobe basilar mild consolidations suggesting acute bronchopneumonia with progressive mucous plugging or chronic bronchitis. On June 21, 2022, he presented to me for follow-up. I have been following him for Stage IA1 right lower lobe squamous cell lung cancer and he is status post stereotactic body radiation therapy. He has been under close observation since then. Reviewed with him findings of CT scan of the chest done on 5/22/22 and we looked at his CT scans together. I recognize that he has stable changes in treated tumor site.  He has been on 2 courses of antibiotics for pneumonia and he just completed levaquin a few days ago. I recommend him to have repeat CT chest in 3 months to make sure that it is not progression of tumor and I will see him again after that. Lower extremity DVT - due to decreased mobility from COPD and SOB. Will keep anticoagulation therapy for now. Hypertension - his BP is normal. Recommend to continue with metoprolol. Hypothyroidism - stable and recommend to continue with levothyroxine 75 mcg daily. COPD - still smoking. Encourage him to quit smoking. To continue with symbicort and bronchodilator nebulizer. Hyperlipidemia - stable on lipitor 10 mg daily. Health maintenance - I recommend him to have age-appropriate cancer screening, exercise, low-fat and low-sodium diet. He does not have any significant symptoms at today's visit. Past Medical History  Significant for  1. Hypertension  2. Hyperlipidemia  3. COPD  4. Peripheral arterial disease  5. Coronary artery disease  6. History of anal canal squamous cell carcinoma, status post concurrent chemoradiation therapy (on remission)    Surgical History  Significant for   1. Hemorrhoid surgery  2. Chemoport placement  3. Cataract surgery  4. Tonsillectomy  5. Colectomy in 2009 by Dr. Velma Oliveros  6. Angioplasty of bilateral legs by Dr. Chad Lee    Allergies  No known medication allergies    Social History  He is a current smoker and he smokes approximately 1 pack/day for last 60 years. He drinks alcohol daily and denies illicit drug abuse. He is currently living in [facility]. Family History  Significant for pancreatic cancer in 1 of his brother. No other pertinent family history. Oncology History   Malignant neoplasm of lower lobe of right lung ([address]   1/8/2019 Initial Diagnosis    Malignant neoplasm of lower lobe of right lung ([address]     2/19/2019 - 2/26/2019 Radiation    Right lower lobe: 54 trey in 3 fractions SBRT       Review of Systems:   \"Per interval history; otherwise 10 point ROS is negative. \"  His energy level is fair and his sleep is good. He doesn't have fever, chills, night sweats, cough, shortness of breath, chest pain, hemoptysis or palpitations. His bowel and bladder functions are normal. He denies nausea, vomiting, abdominal pain, diarrhea, constipation, dysuria, loss of appetite or weight loss. He doesn't have neuropathy and he denies bleeding or clotting issues. He doesn't have any pain in his body. No anxiety or depression. The rest of the systems are unremarkable. Vital Signs:  BP (!) 179/73 (Site: Right Upper Arm, Position: Sitting, Cuff Size: Medium Adult)   Pulse 93   Temp 98.3 °F (36.8 °C) (Infrared)   Ht 5' 7\" (1.702 m)   Wt 112 lb (50.8 kg)   SpO2 94%   PF (!) 2 L/min   BMI 17.54 kg/m²     Physical Exam:  CONSTITUTIONAL: awake, no apparent distress, alert, cooperative,   EYES: pupils equal, round and reactive to light, sclera clear and conjunctiva normal  ENT: Normocephalic, without obvious abnormality, atraumatic  NECK: supple, symmetrical, no jugular venous distension and no carotid bruits   HEMATOLOGIC/LYMPHATIC: no cervical, supraclavicular or axillary lymphadenopathy   LUNGS: VBS, no wheezes, clear to auscultation, no rhonchi, no increased work of breathing, no crackles,    CARDIOVASCULAR: regular rate and rhythm, normal S1 and S2, no murmur noted  ABDOMEN: soft, non-distended, normal bowel sounds x 4, non-tender, no masses palpated, no hepatosplenomegaly   MUSCULOSKELETAL: full range of motion noted, tone is normal  NEUROLOGIC: awake, alert, oriented to name, place and time. Motor skills grossly intact. SKIN: Normal skin color, texture, turgor and no jaundice.  appears intact   EXTREMITIES: no clubbing, no LE edema, no cyanosis, no leg swelling,      Labs:  Hematology:  Lab Results   Component Value Date    WBC 5.0 05/24/2022    RBC 3.34 (L) 05/24/2022    HGB 10.8 (L) 05/24/2022    HCT 34.7 (L) 05/24/2022    .9 (H) 05/24/2022    MCH 32.3 (H) 05/24/2022 MCHC 31.1 (L) 05/24/2022    RDW 13.4 05/24/2022     05/24/2022    MPV 9.1 05/24/2022    BANDSPCT 5 02/27/2012    SEGSPCT 78.0 (H) 05/24/2022    EOSRELPCT 0.6 05/24/2022    BASOPCT 0.4 05/24/2022    LYMPHOPCT 8.5 (L) 05/24/2022    MONOPCT 12.1 (H) 05/24/2022    BANDABS 0.32 02/27/2012    SEGSABS 3.9 05/24/2022    EOSABS 0.0 05/24/2022    BASOSABS 0.0 05/24/2022    LYMPHSABS 0.4 05/24/2022    MONOSABS 0.6 05/24/2022    DIFFTYPE AUTOMATED DIFFERENTIAL 05/24/2022    PLTM FEW LARGE PLATELET FORMS SEEN 80/53/4866     No results found for: ESR  Chemistry:  Lab Results   Component Value Date     05/24/2022    K 4.1 05/24/2022     05/24/2022    CO2 27 05/24/2022    BUN 7 05/24/2022    CREATININE 0.6 (L) 05/24/2022    GLUCOSE 90 05/24/2022    CALCIUM 8.3 05/24/2022    PROT 6.2 (L) 05/10/2022    LABALBU 3.9 05/10/2022    BILITOT 0.4 05/10/2022    ALKPHOS 85 05/10/2022    AST 24 05/10/2022    ALT 22 05/10/2022    LABGLOM >60 05/24/2022    GFRAA >60 05/24/2022    AGRATIO 1.7 05/10/2022    GLOB 1.9 05/10/2021    POCGLU 171 (H) 07/22/2021     No results found for: MMA, LDH, HOMOCYSTEINE  No components found for: LD  Lab Results   Component Value Date    TSHHS 4.200 12/01/2020    T4FREE 1.2 05/10/2022     Immunology:  Lab Results   Component Value Date    PROT 6.2 (L) 05/10/2022     No results found for: Polo Stapler, KLFLCR  No results found for: B2M  Coagulation Panel:  Lab Results   Component Value Date    PROTIME 11.2 (L) 07/22/2021    INR 0.87 07/22/2021    APTT 34.1 05/24/2022     Anemia Panel:  Lab Results   Component Value Date    QYNMZCXR96 288.2 05/22/2022    FOLATE 14.4 05/22/2022     Tumor Markers:  No results found for: , CEA, , LABCA2, PSA  Observations:  No data recorded     Assessment & Plan:   Stage IA1 right lower lobe squamous cell lung cancer    PLAN  Mr. Stella Olivera is a 67year old very pleasant gentleman who was found to have 1 cm ground glass nodule in his left lower lobe on screening low dose CT scan, done on July 20, 2018. Subsequently, he had PET/CT scan and it showed metabolically active 5 mm nodule in right lower lobe, potentially malignant. No metabolic activity in left lower lobe ground glass nodule. Repeat CT scan of the chest on November 14, 2018 showed increased in size of right lower lobe lung mass, considered neoplastic until proven otherwise. Subsequently had CT guided biopsy of the right lower lobe lung mass on Decmeber 7, 2018 and final pathology was consistent with squamous cell carcinoma. PET/CT scan done on December 27, 2018 showed slightly increased size and increased uptake of the biopsy-proven malignancy in the right lower lobe. No findings of metastatic disease. MRI of the brain with and without contrast done on 1/3/19 showed No acute intracranial abnormality. No intracranial metastatic disease. 2. Age-related parenchymal volume loss and mild chronic microvascular ischemic changes. He was seen by cardio thoracic surgeon and they don't think he is a candidate for surgery. He was subsequently seen by Dr. Linda Obando and he was treated with stereotactic body radiation therapy between February 19 and February 26, 2019. He has been in a close observation since then. CT chest with contrast done on September 7, 2021 showed similar posttreatment change in the right lower lobe without evidence of disease. Unchanged 8 mm groundglass nodule of the right upper lobe and increased in size now 1.2 cm groundglass nodule of the left upper lobe. CT chest done on March 4, 2022 showed slightly increasing consolidation within the superior segment of the right lower lobe with adjacent trace pleural effusion and volume loss likely evolving posttreatment changes/fibrosis. Slight interval decrease in size of a 6 mm groundglass nodule in the anterior inferior right upper lobe with interval resolution of nodule in the left upper lobe.     He developed nonocclusive acute common femoral veing DVT, moderate non occlusive acute popliteal vein DVT on 5/22/22. He has been on eliquis since then. I believe his VTE is likely secondary to immobility related to dyspnea associated with COPD. CT chest done on 5/22/2022 showed stable right lower lobe superior segment masslike consolidation and architectural distortion. No definite evidence of malignant disease progression. Acute bilateral lower lobe progressive bronchial thickening and endobronchial debris with patchy bilateral lower lobe basilar mild consolidations suggesting acute bronchopneumonia with progressive mucous plugging or chronic bronchitis. On June 21, 2022, he presented to me for follow-up. I have been following him for Stage IA1 right lower lobe squamous cell lung cancer and he is status post stereotactic body radiation therapy. He has been under close observation since then. Reviewed with him findings of CT scan of the chest done on 5/22/22 and we looked at his CT scans together. I recognize that he has stable changes in treated tumor site. He has been on 2 courses of antibiotics for pneumonia and he just completed levaquin a few days ago. I recommend him to have repeat CT chest in 3 months to make sure that it is not progression of tumor and I will see him again after that. Lower extremity DVT - due to decreased mobility from COPD and SOB. Will keep anticoagulation therapy for now. Hypertension - his BP is normal. Recommend to continue with metoprolol. Hypothyroidism - stable and recommend to continue with levothyroxine 75 mcg daily. COPD - still smoking. Encourage him to quit smoking. To continue with symbicort and bronchodilator nebulizer. Hyperlipidemia - stable on lipitor 10 mg daily. Health maintenance - I recommend him to have age-appropriate cancer screening, exercise, low-fat and low-sodium diet. I answered all his questions and concerns for today.  I asked him to follow-up with primary care physician, Dr. Mamie Mccann, and Dr. Esperanza Puri on regular basis. Recent imaging and labs were reviewed and discussed with the patient.

## 2022-07-07 ENCOUNTER — HOSPITAL ENCOUNTER (OUTPATIENT)
Dept: GENERAL RADIOLOGY | Age: 77
Discharge: HOME | End: 2022-07-07
Payer: MEDICARE

## 2022-07-07 ENCOUNTER — OFFICE VISIT (OUTPATIENT)
Dept: INTERNAL MEDICINE CLINIC | Age: 77
End: 2022-07-07
Payer: MEDICARE

## 2022-07-07 ENCOUNTER — HOSPITAL ENCOUNTER (OUTPATIENT)
Age: 77
Discharge: HOME | End: 2022-07-07
Payer: MEDICARE

## 2022-07-07 VITALS
OXYGEN SATURATION: 98 % | BODY MASS INDEX: 17.54 KG/M2 | DIASTOLIC BLOOD PRESSURE: 74 MMHG | HEART RATE: 81 BPM | WEIGHT: 112 LBS | SYSTOLIC BLOOD PRESSURE: 138 MMHG | RESPIRATION RATE: 18 BRPM

## 2022-07-07 DIAGNOSIS — I82.431 ACUTE DEEP VEIN THROMBOSIS (DVT) OF POPLITEAL VEIN OF RIGHT LOWER EXTREMITY (HCC): ICD-10-CM

## 2022-07-07 DIAGNOSIS — J18.9 PNEUMONIA OF RIGHT LOWER LOBE DUE TO INFECTIOUS ORGANISM: Primary | ICD-10-CM

## 2022-07-07 DIAGNOSIS — J44.9 CHRONIC OBSTRUCTIVE PULMONARY DISEASE, UNSPECIFIED COPD TYPE (HCC): ICD-10-CM

## 2022-07-07 DIAGNOSIS — J18.9 PNEUMONIA OF RIGHT LOWER LOBE DUE TO INFECTIOUS ORGANISM: ICD-10-CM

## 2022-07-07 PROCEDURE — G8427 DOCREV CUR MEDS BY ELIG CLIN: HCPCS | Performed by: INTERNAL MEDICINE

## 2022-07-07 PROCEDURE — 71046 X-RAY EXAM CHEST 2 VIEWS: CPT

## 2022-07-07 PROCEDURE — 99213 OFFICE O/P EST LOW 20 MIN: CPT | Performed by: INTERNAL MEDICINE

## 2022-07-07 PROCEDURE — 4004F PT TOBACCO SCREEN RCVD TLK: CPT | Performed by: INTERNAL MEDICINE

## 2022-07-07 PROCEDURE — G8419 CALC BMI OUT NRM PARAM NOF/U: HCPCS | Performed by: INTERNAL MEDICINE

## 2022-07-07 PROCEDURE — 1123F ACP DISCUSS/DSCN MKR DOCD: CPT | Performed by: INTERNAL MEDICINE

## 2022-07-07 PROCEDURE — 3023F SPIROM DOC REV: CPT | Performed by: INTERNAL MEDICINE

## 2022-07-07 NOTE — PROGRESS NOTES
Deysi Bryant  1945 07/07/22    SUBJECTIVE:  SOB much improved w breztri, 2 puffs BID but at times more SOB and asked to incr but there is only one standard dose. no recent cough. Is to f/u w Dr Contreras Sandhu also inSept and for recheck PFT.s    R DVT, Dr Andrei Bartlett rec for anticoagulation for 6-12mo but this was not documented on his note 6/21. Is sched for CT recheck chest Sept.    OBJECTIVE:    /74   Pulse 81   Resp 18   Wt 112 lb (50.8 kg)   SpO2 98%   BMI 17.54 kg/m²     Physical Exam  Vitals reviewed. Constitutional:       Appearance: He is well-developed. Cardiovascular:      Rate and Rhythm: Normal rate and regular rhythm. Heart sounds: Normal heart sounds. No murmur heard. No friction rub. No gallop. Pulmonary:      Effort: Pulmonary effort is normal. No respiratory distress. Breath sounds: Normal breath sounds. No wheezing or rales. Abdominal:      General: Bowel sounds are normal. There is no distension. Palpations: Abdomen is soft. Tenderness: There is no abdominal tenderness. Musculoskeletal:      Cervical back: Neck supple. Neurological:      Mental Status: He is alert. ASSESSMENT:    1. Pneumonia of right lower lobe due to infectious organism    2. Acute deep vein thrombosis (DVT) of popliteal vein of right lower extremity (HCC)    3. Chronic obstructive pulmonary disease, unspecified COPD type ([address]        PLAN:    Tara Yates was seen today for shortness of breath. Diagnoses and all orders for this visit:    Pneumonia of right lower lobe due to infectious organism- clinically resolved and we'll recheck cxr  -     XR CHEST STANDARD (2 VW);  Future    Acute deep vein thrombosis (DVT) of popliteal vein of right lower extremity ([address]- for cont anticoagulation 6-12 mo and also seeing Dr Andrei Bartlett    Chronic obstructive pulmonary disease, unspecified COPD type ([address]- ct f/u DR Contreras Sandhu and recheck cxr, cont inhaler regimen  -     XR CHEST STANDARD (2 VW); Future

## 2022-07-11 NOTE — RESULT ENCOUNTER NOTE
Please call pt, his cxr suggests still may have a R sided pneumonia but clinically is much better so I suspect this is more likely scar tissue.   As long as feeling well w/o any worsening cough, fever or SOB, we'll wait and see what his next f/u CT chest scan shows, ordered by Dr Donald Luis---  [place] Tiffanie Montero, [facility]

## 2022-07-25 RX ORDER — METOPROLOL SUCCINATE 25 MG/1
25 TABLET, EXTENDED RELEASE ORAL DAILY
Qty: 30 TABLET | Refills: 3 | Status: SHIPPED | OUTPATIENT
Start: 2022-07-25 | End: 2022-10-14

## 2022-08-01 ENCOUNTER — TELEPHONE (OUTPATIENT)
Dept: INTERNAL MEDICINE CLINIC | Age: 77
End: 2022-08-01

## 2022-08-01 DIAGNOSIS — J42 CHRONIC BRONCHITIS, UNSPECIFIED CHRONIC BRONCHITIS TYPE (HCC): ICD-10-CM

## 2022-08-01 RX ORDER — ALBUTEROL SULFATE 90 UG/1
2 AEROSOL, METERED RESPIRATORY (INHALATION) EVERY 4 HOURS PRN
Qty: 3 EACH | Refills: 3 | Status: SHIPPED | OUTPATIENT
Start: 2022-08-01

## 2022-08-01 NOTE — TELEPHONE ENCOUNTER
Patient's daughter states that Matt Ordaz is still smoking heavily and using his inhaler more frequently as prescribed, 2 puffs every 6 hours. She would like to know how frequent he can use the inhaler and she would need a new RX with the increased frequency if that is advisable. Jd Hernández is almost out of his current inhaler and insurance pay for another until 8/11. Please advise.

## 2022-08-09 RX ORDER — METOPROLOL SUCCINATE 25 MG/1
TABLET, EXTENDED RELEASE ORAL
Qty: 90 TABLET | Refills: 1 | OUTPATIENT
Start: 2022-08-09

## 2022-08-11 ENCOUNTER — TELEPHONE (OUTPATIENT)
Dept: ONCOLOGY | Age: 77
End: 2022-08-11

## 2022-08-11 NOTE — TELEPHONE ENCOUNTER
8/11/22 - talked w/pt and give him CT scan on 8/23/22 at [facility] arrival time of 10:30 am and prep is NPO 4 hrs prior. Patient voiced understanding. Left number [phone] for questions or concerns.

## 2022-08-23 ENCOUNTER — HOSPITAL ENCOUNTER (OUTPATIENT)
Dept: CT IMAGING | Age: 77
Discharge: HOME | End: 2022-08-23
Payer: MEDICARE

## 2022-08-23 DIAGNOSIS — C34.31 MALIGNANT NEOPLASM OF LOWER LOBE OF RIGHT LUNG (HCC): ICD-10-CM

## 2022-08-23 PROCEDURE — 71260 CT THORAX DX C+: CPT

## 2022-08-23 PROCEDURE — 6360000004 HC RX CONTRAST MEDICATION: Performed by: INTERNAL MEDICINE

## 2022-08-23 PROCEDURE — 2580000003 HC RX 258: Performed by: INTERNAL MEDICINE

## 2022-08-23 RX ORDER — SODIUM CHLORIDE 0.9 % (FLUSH) 0.9 %
5-40 SYRINGE (ML) INJECTION PRN
Status: DISCONTINUED | OUTPATIENT
Start: 2022-08-23 | End: 2022-08-24 | Stop reason: HOSPADM

## 2022-08-23 RX ADMIN — IOPAMIDOL 75 ML: 755 INJECTION, SOLUTION INTRAVENOUS at 10:50

## 2022-08-23 RX ADMIN — SODIUM CHLORIDE, PRESERVATIVE FREE 10 ML: 5 INJECTION INTRAVENOUS at 10:50

## 2022-08-25 ENCOUNTER — HOSPITAL ENCOUNTER (OUTPATIENT)
Dept: INFUSION THERAPY | Age: 77
Discharge: HOME | End: 2022-08-25

## 2022-08-25 ENCOUNTER — OFFICE VISIT (OUTPATIENT)
Dept: ONCOLOGY | Age: 77
End: 2022-08-25
Payer: MEDICARE

## 2022-08-25 DIAGNOSIS — C34.31 MALIGNANT NEOPLASM OF LOWER LOBE OF RIGHT LUNG (HCC): Primary | ICD-10-CM

## 2022-08-25 PROCEDURE — 99213 OFFICE O/P EST LOW 20 MIN: CPT | Performed by: INTERNAL MEDICINE

## 2022-08-25 PROCEDURE — G8427 DOCREV CUR MEDS BY ELIG CLIN: HCPCS | Performed by: INTERNAL MEDICINE

## 2022-08-25 PROCEDURE — G8419 CALC BMI OUT NRM PARAM NOF/U: HCPCS | Performed by: INTERNAL MEDICINE

## 2022-08-25 PROCEDURE — 1123F ACP DISCUSS/DSCN MKR DOCD: CPT | Performed by: INTERNAL MEDICINE

## 2022-08-25 PROCEDURE — 4004F PT TOBACCO SCREEN RCVD TLK: CPT | Performed by: INTERNAL MEDICINE

## 2022-08-25 NOTE — PROGRESS NOTES
Patient Name: Yuli Goode  Patient : 1945  Patient MRN: [id number]     Primary Oncologist: Jeremy Roberts MD  Referring Provider: Dontae Kay MD     Date of Service: 2022      Chief Complaint:   Chief Complaint   Patient presents with    Follow-up     Patient Active Problem List:     Malignant neoplasm of lower lobe of right lung      SCC (squamous cell carcinoma of lung), right     HPI:   Mr. Sameer Bales is a 63-year-old very pleasant gentleman with medical history significant for hypertension, COPD, hyperlipidemia, peripheral arterial disease, coronary artery disease, history of anal canal squamous cell carcinoma, status post chemoradiation therapy (completed in 2009), initially referred to me on 2018 for evaluation of clinical stage IA1 right lower lobe squamous cell lung cancer. He stated that he has screening low dose CT scan of the chest (ordered by Dr. Yobani Brito) on 2018 and it showed 10 mm ground glass left lower lobe nodule, which is new since . Subsequently he had PET/CT scan on 18 and it showed metabolically active 5 mm nodule in the right lower lobe, potentially malignant. There was no FDG activity associated with the ground glass left lower lobe nodule seen on prior CT scan. Repeat CT scan on 18 showed increasing irregular right lower lobe nodule 7.9 x 7.2 mm, considered neoplastic until proven otherwise. Resolution of left lower lobe ground glass density. He subsequently underwent CT guided biopsy of right lower lobe lung mass on 18 and final pathology was consistent with squamous cell carcinoma. He was subsequently referred to me for evaluation. PET/CT scan done on 2018 showed slightly increased size and increased uptake of the biopsy-proven malignancy in the right lower lobe. No findings of metastatic disease.     MRI of the brain with and without contrast done on 1/3/19 showed No acute intracranial abnormality. No intracranial metastatic disease. 2. Age-related parenchymal volume loss and mild chronic microvascular ischemic changes. He was seen by cardio thoracic surgeon and they don't think he is a candidate for surgery. He was subsequently seen by Dr. Guerrero Nazario and he was treated with stereotactic body radiation therapy between February 19 and February 26, 2019. CT chest done on March 4, 2022 showed slightly increasing consolidation within the superior segment of the right lower lobe with adjacent trace pleural effusion and volume loss likely evolving posttreatment changes/fibrosis. Slight interval decrease in size of a 6 mm groundglass nodule in the anterior inferior right upper lobe with interval resolution of nodule in the left upper lobe. He developed nonocclusive acute common femoral veing DVT, moderate non occlusive acute popliteal vein DVT on 5/22/22. He has been on eliquis since then. I believe his VTE is likely secondary to immobility related to dyspnea associated with COPD. CT chest done on 5/22/2022 showed stable right lower lobe superior segment masslike consolidation and architectural distortion. No definite evidence of malignant disease progression. Acute bilateral lower lobe progressive bronchial thickening and endobronchial debris with patchy bilateral lower lobe basilar mild consolidations suggesting acute bronchopneumonia with progressive mucous plugging or chronic bronchitis. CT chest done on 8/23/2022 showed stable exam with fibrosis and volume loss in the superior segment of the right lower lobe suggesting stable posttreatment changes. No evidence of progression of disease. No evidence of metastatic disease. Stable left adrenal nodule and right adrenal gland thickening, suggesting adrenal adenomas and hyperplasia given unchanged appearance since 2018. On August 25, 2022, he presented to me for follow-up.  I have been following him for Stage IA1 right lower lobe squamous cell lung cancer and he is status post stereotactic body radiation therapy. He has been under close observation since then. Reviewed with him findings of CT scan of the chest done on 8/23/22 and we looked at his CT scans together. I recognize that he has stable changes in treated tumor site. I recommend to continue with observation. Since it has been 3 years now, I recommend him to have chest X ray in 6 months to make sure that it is not progression of tumor and I will see him again after that. I will do CT chest yearly basis starting from now. Lower extremity DVT - due to decreased mobility from COPD and SOB. Will keep anticoagulation therapy for now since he still has sedentary life style. Hypertension - his BP is normal. Recommend to continue with metoprolol. Hypothyroidism - stable and recommend to continue with levothyroxine 75 mcg daily. COPD - still smoking. Encourage him to quit smoking. To continue with symbicort and bronchodilator nebulizer. Hyperlipidemia - stable on lipitor 10 mg daily. Health maintenance - I recommend him to have age-appropriate cancer screening, exercise, low-fat and low-sodium diet. He does not have any significant symptoms at today's visit. Past Medical History  Significant for  1. Hypertension  2. Hyperlipidemia  3. COPD  4. Peripheral arterial disease  5. Coronary artery disease  6. History of anal canal squamous cell carcinoma, status post concurrent chemoradiation therapy (on remission)    Surgical History  Significant for   1. Hemorrhoid surgery  2. Chemoport placement  3. Cataract surgery  4. Tonsillectomy  5. Colectomy in 2009 by Dr. Serena Odom  6. Angioplasty of bilateral legs by Dr. Erendira Rojas    Allergies  No known medication allergies    Social History  He is a current smoker and he smokes approximately 1 pack/day for last 60 years. He drinks alcohol daily and denies illicit drug abuse.   He is currently living in [place]. Family History  Significant for pancreatic cancer in 1 of his brother. No other pertinent family history. Oncology History   Malignant neoplasm of lower lobe of right lung ([facility] [address]   1/8/2019 Initial Diagnosis    Malignant neoplasm of lower lobe of right lung ([facility] [address]     2/19/2019 - 2/26/2019 Radiation    Right lower lobe: 54 trey in 3 fractions SBRT       Review of Systems: \"Per interval history; otherwise 10 point ROS is negative. \"  His energy level is okay and his sleep is fine. He doesn't have fever, chills, night sweats, cough, shortness of breath, chest pain, hemoptysis or palpitations. His bowel and bladder functions are normal. He denies nausea, vomiting, abdominal pain, diarrhea, constipation, dysuria, loss of appetite or weight loss. He doesn't have neuropathy and he denies bleeding or clotting issues. He denies any pain in his body. No anxiety or depression. The rest of the systems are unremarkable. Vital Signs: There were no vitals taken for this visit. Physical Exam:  CONSTITUTIONAL: awake, no apparent distress, alert, cooperative,   EYES: pupils equal, round and reactive to light, sclera clear and conjunctiva normal  ENT: Normocephalic, without obvious abnormality, atraumatic  NECK: supple, symmetrical, no jugular venous distension and no carotid bruits   HEMATOLOGIC/LYMPHATIC: no cervical, supraclavicular or axillary lymphadenopathy   LUNGS: VBS, no wheezes, clear to auscultation, no rhonchi, no increased work of breathing, no crackles,    CARDIOVASCULAR: regular rate and rhythm, normal S1 and S2, no murmur noted  ABDOMEN: soft, non-distended, normal bowel sounds x 4, non-tender, no masses palpated, no hepatosplenomegaly   MUSCULOSKELETAL: full range of motion noted, tone is normal  NEUROLOGIC: awake, alert, oriented to name, place and time. Motor skills grossly intact. SKIN: Normal skin color, texture, turgor and no jaundice.  appears intact   EXTREMITIES: no LE edema, no cyanosis, no clubbing, no leg swelling,      Labs:  Hematology:  Lab Results   Component Value Date    WBC 5.0 05/24/2022    RBC 3.34 (L) 05/24/2022    HGB 10.8 (L) 05/24/2022    HCT 34.7 (L) 05/24/2022    .9 (H) 05/24/2022    MCH 32.3 (H) 05/24/2022    MCHC 31.1 (L) 05/24/2022    RDW 13.4 05/24/2022     05/24/2022    MPV 9.1 05/24/2022    BANDSPCT 5 02/27/2012    SEGSPCT 78.0 (H) 05/24/2022    EOSRELPCT 0.6 05/24/2022    BASOPCT 0.4 05/24/2022    LYMPHOPCT 8.5 (L) 05/24/2022    MONOPCT 12.1 (H) 05/24/2022    BANDABS 0.32 02/27/2012    SEGSABS 3.9 05/24/2022    EOSABS 0.0 05/24/2022    BASOSABS 0.0 05/24/2022    LYMPHSABS 0.4 05/24/2022    MONOSABS 0.6 05/24/2022    DIFFTYPE AUTOMATED DIFFERENTIAL 05/24/2022    PLTM FEW LARGE PLATELET FORMS SEEN 44/59/4316     No results found for: ESR  Chemistry:  Lab Results   Component Value Date     05/24/2022    K 4.1 05/24/2022     05/24/2022    CO2 27 05/24/2022    BUN 7 05/24/2022    CREATININE 0.8 (L) 08/23/2022    GLUCOSE 90 05/24/2022    CALCIUM 8.3 05/24/2022    PROT 6.2 (L) 05/10/2022    LABALBU 3.9 05/10/2022    BILITOT 0.4 05/10/2022    ALKPHOS 85 05/10/2022    AST 24 05/10/2022    ALT 22 05/10/2022    LABGLOM >60 08/23/2022    GFRAA >60 08/23/2022    AGRATIO 1.7 05/10/2022    GLOB 1.9 05/10/2021    POCGLU 171 (H) 07/22/2021     No results found for: MMA, LDH, HOMOCYSTEINE  No components found for: LD  Lab Results   Component Value Date    TSHHS 4.200 12/01/2020    T4FREE 1.2 05/10/2022     Immunology:  Lab Results   Component Value Date    PROT 6.2 (L) 05/10/2022     No results found for: Nathanael Hubbard, KLFLCR  No results found for: B2M  Coagulation Panel:  Lab Results   Component Value Date    PROTIME 11.2 (L) 07/22/2021    INR 0.87 07/22/2021    APTT 34.1 05/24/2022     Anemia Panel:  Lab Results   Component Value Date    RUKAPUZQ56 288.2 05/22/2022    FOLATE 14.4 05/22/2022     Tumor Markers:  No results found for: , CEA, , LABCA2, PSA  Observations:  No data recorded     Assessment & Plan:   Stage IA1 right lower lobe squamous cell lung cancer    PLAN  Mr. Agustina Johnston is a 67year old very pleasant gentleman who was found to have 1 cm ground glass nodule in his left lower lobe on screening low dose CT scan, done on July 20, 2018. Subsequently, he had PET/CT scan and it showed metabolically active 5 mm nodule in right lower lobe, potentially malignant. No metabolic activity in left lower lobe ground glass nodule. Repeat CT scan of the chest on November 14, 2018 showed increased in size of right lower lobe lung mass, considered neoplastic until proven otherwise. Subsequently had CT guided biopsy of the right lower lobe lung mass on Decmeber 7, 2018 and final pathology was consistent with squamous cell carcinoma. PET/CT scan done on December 27, 2018 showed slightly increased size and increased uptake of the biopsy-proven malignancy in the right lower lobe. No findings of metastatic disease. MRI of the brain with and without contrast done on 1/3/19 showed No acute intracranial abnormality. No intracranial metastatic disease. 2. Age-related parenchymal volume loss and mild chronic microvascular ischemic changes. He was seen by cardio thoracic surgeon and they don't think he is a candidate for surgery. He was subsequently seen by Dr. Al Rivera and he was treated with stereotactic body radiation therapy between February 19 and February 26, 2019. He has been in a close observation since then. CT chest with contrast done on September 7, 2021 showed similar posttreatment change in the right lower lobe without evidence of disease. Unchanged 8 mm groundglass nodule of the right upper lobe and increased in size now 1.2 cm groundglass nodule of the left upper lobe.       CT chest done on March 4, 2022 showed slightly increasing consolidation within the superior segment of the right lower lobe with adjacent trace pleural effusion and volume loss likely evolving posttreatment changes/fibrosis. Slight interval decrease in size of a 6 mm groundglass nodule in the anterior inferior right upper lobe with interval resolution of nodule in the left upper lobe. He developed nonocclusive acute common femoral veing DVT, moderate non occlusive acute popliteal vein DVT on 5/22/22. He has been on eliquis since then. I believe his VTE is likely secondary to immobility related to dyspnea associated with COPD. CT chest done on 5/22/2022 showed stable right lower lobe superior segment masslike consolidation and architectural distortion. No definite evidence of malignant disease progression. Acute bilateral lower lobe progressive bronchial thickening and endobronchial debris with patchy bilateral lower lobe basilar mild consolidations suggesting acute bronchopneumonia with progressive mucous plugging or chronic bronchitis. CT chest done on 8/23/2022 showed stable exam with fibrosis and volume loss in the superior segment of the right lower lobe suggesting stable posttreatment changes. No evidence of progression of disease. No evidence of metastatic disease. Stable left adrenal nodule and right adrenal gland thickening, suggesting adrenal adenomas and hyperplasia given unchanged appearance since 2018. On August 25, 2022, he presented to me for follow-up. I have been following him for Stage IA1 right lower lobe squamous cell lung cancer and he is status post stereotactic body radiation therapy. He has been under close observation since then. Reviewed with him findings of CT scan of the chest done on 8/23/22 and we looked at his CT scans together. I recognize that he has stable changes in treated tumor site. I recommend to continue with observation. Since it has been 3 years now, I recommend him to have chest X ray in 6 months to make sure that it is not progression of tumor and I will see him again after that.  I will do CT chest yearly basis starting from now. Lower extremity DVT - due to decreased mobility from COPD and SOB. Will keep anticoagulation therapy for now since he still has sedentary life style. Hypertension - his BP is normal. Recommend to continue with metoprolol. Hypothyroidism - stable and recommend to continue with levothyroxine 75 mcg daily. COPD - still smoking. Encourage him to quit smoking. To continue with symbicort and bronchodilator nebulizer. Hyperlipidemia - stable on lipitor 10 mg daily. Health maintenance - I recommend him to have age-appropriate cancer screening, exercise, low-fat and low-sodium diet. I answered all his questions and concerns for today. Recent imaging and labs were reviewed and discussed with the patient.

## 2022-08-25 NOTE — PROGRESS NOTES
[facility] Rooming Questions  Patient: Rhonda Kay  MRN: [id number]    Date: 8/25/2022        1. Do you have any new issues?   no         2. Do you need any refills on medications? yes - If he needs to cont Eliquis? 3. Have you had any imaging done since your last visit? yes - CT    4. Have you been hospitalized or seen in the emergency room since your last visit here?   no    5. Did the patient have a depression screening completed today?  No    No data recorded     PHQ-9 Given to (if applicable):               PHQ-9 Score (if applicable):                     [] Positive     []  Negative              Does question #9 need addressed (if applicable)                     [] Yes    []  No               Marquetta Kanner, CMA

## 2022-09-13 DIAGNOSIS — J44.9 CHRONIC OBSTRUCTIVE PULMONARY DISEASE, UNSPECIFIED COPD TYPE (HCC): Primary | ICD-10-CM

## 2022-09-27 ENCOUNTER — OFFICE VISIT (OUTPATIENT)
Dept: PULMONOLOGY | Age: 77
End: 2022-09-27
Payer: MEDICARE

## 2022-09-27 ENCOUNTER — HOSPITAL ENCOUNTER (OUTPATIENT)
Dept: PULMONOLOGY | Age: 77
Discharge: HOME | End: 2022-09-27
Payer: MEDICARE

## 2022-09-27 ENCOUNTER — APPOINTMENT (OUTPATIENT)
Dept: CT IMAGING | Age: 77
End: 2022-09-27
Payer: MEDICARE

## 2022-09-27 VITALS
HEIGHT: 66 IN | DIASTOLIC BLOOD PRESSURE: 74 MMHG | WEIGHT: 110.8 LBS | HEART RATE: 80 BPM | SYSTOLIC BLOOD PRESSURE: 126 MMHG | OXYGEN SATURATION: 95 % | BODY MASS INDEX: 17.81 KG/M2

## 2022-09-27 DIAGNOSIS — C34.91 SCC (SQUAMOUS CELL CARCINOMA OF LUNG), RIGHT (HCC): ICD-10-CM

## 2022-09-27 DIAGNOSIS — J44.9 CHRONIC OBSTRUCTIVE PULMONARY DISEASE, UNSPECIFIED COPD TYPE (HCC): ICD-10-CM

## 2022-09-27 DIAGNOSIS — J44.9 COPD, SEVERE (HCC): ICD-10-CM

## 2022-09-27 DIAGNOSIS — F17.210 CIGARETTE SMOKER: ICD-10-CM

## 2022-09-27 DIAGNOSIS — I82.431 ACUTE DEEP VEIN THROMBOSIS (DVT) OF POPLITEAL VEIN OF RIGHT LOWER EXTREMITY (HCC): ICD-10-CM

## 2022-09-27 DIAGNOSIS — R06.02 SOB (SHORTNESS OF BREATH) ON EXERTION: ICD-10-CM

## 2022-09-27 LAB
DLCO %PRED: 19 %
DLCO PRED: NORMAL
DLCO/VA %PRED: NORMAL
DLCO/VA PRED: NORMAL
DLCO/VA: NORMAL
DLCO: NORMAL
EXPIRATORY TIME-POST: NORMAL
EXPIRATORY TIME: NORMAL
FEF 25-75% %CHNG: NORMAL
FEF 25-75% %PRED-POST: NORMAL
FEF 25-75% %PRED-PRE: NORMAL
FEF 25-75% PRED: NORMAL
FEF 25-75%-POST: NORMAL
FEF 25-75%-PRE: NORMAL
FEV1 %PRED-POST: 24 %
FEV1 %PRED-PRE: 20 %
FEV1 PRED: NORMAL
FEV1-POST: NORMAL
FEV1-PRE: NORMAL
FEV1/FVC %PRED-POST: NORMAL
FEV1/FVC %PRED-PRE: NORMAL
FEV1/FVC PRED: NORMAL
FEV1/FVC-POST: 61 %
FEV1/FVC-PRE: 42 %
FVC %PRED-POST: NORMAL
FVC %PRED-PRE: NORMAL
FVC PRED: NORMAL
FVC-POST: NORMAL
FVC-PRE: NORMAL
GAW %PRED: NORMAL
GAW PRED: NORMAL
GAW: NORMAL
IC %PRED: NORMAL
IC PRED: NORMAL
IC: NORMAL
MEP: NORMAL
MIP: NORMAL
MVV %PRED-PRE: NORMAL
MVV PRED: NORMAL
MVV-PRE: NORMAL
PEF %PRED-POST: NORMAL
PEF %PRED-PRE: NORMAL
PEF PRED: NORMAL
PEF%CHNG: NORMAL
PEF-POST: NORMAL
PEF-PRE: NORMAL
RAW %PRED: NORMAL
RAW PRED: NORMAL
RAW: NORMAL
RV %PRED: NORMAL
RV PRED: NORMAL
RV: NORMAL
SVC %PRED: NORMAL
SVC PRED: NORMAL
SVC: NORMAL
TLC %PRED: 110 %
TLC PRED: NORMAL
TLC: NORMAL
VA %PRED: NORMAL
VA PRED: NORMAL
VA: NORMAL
VTG %PRED: NORMAL
VTG PRED: NORMAL
VTG: NORMAL

## 2022-09-27 PROCEDURE — 99214 OFFICE O/P EST MOD 30 MIN: CPT | Performed by: INTERNAL MEDICINE

## 2022-09-27 PROCEDURE — G8419 CALC BMI OUT NRM PARAM NOF/U: HCPCS | Performed by: INTERNAL MEDICINE

## 2022-09-27 PROCEDURE — 94729 DIFFUSING CAPACITY: CPT

## 2022-09-27 PROCEDURE — 3023F SPIROM DOC REV: CPT | Performed by: INTERNAL MEDICINE

## 2022-09-27 PROCEDURE — 94727 GAS DIL/WSHOT DETER LNG VOL: CPT

## 2022-09-27 PROCEDURE — 94060 EVALUATION OF WHEEZING: CPT

## 2022-09-27 PROCEDURE — G8428 CUR MEDS NOT DOCUMENT: HCPCS | Performed by: INTERNAL MEDICINE

## 2022-09-27 RX ORDER — BUDESONIDE, GLYCOPYRROLATE, AND FORMOTEROL FUMARATE 160; 9; 4.8 UG/1; UG/1; UG/1
2 AEROSOL, METERED RESPIRATORY (INHALATION) DAILY
Qty: 3 EACH | Refills: 3 | Status: SHIPPED | OUTPATIENT
Start: 2022-09-27

## 2022-09-27 ASSESSMENT — ENCOUNTER SYMPTOMS
BACK PAIN: 0
EYE ITCHING: 0
SHORTNESS OF BREATH: 1
EYE DISCHARGE: 0
COUGH: 1
ABDOMINAL PAIN: 0
ABDOMINAL DISTENTION: 0

## 2022-09-27 ASSESSMENT — PULMONARY FUNCTION TESTS
FEV1/FVC_PRE: 42
FEV1/FVC_POST: 61
FEV1_PERCENT_PREDICTED_PRE: 20
FEV1_PERCENT_PREDICTED_POST: 24

## 2022-09-27 NOTE — PROGRESS NOTES
Cristy Lamar  1945  Referring Provider: Nickolas Johnson MD    Subjective:     Chief Complaint   Patient presents with    Results     PFT       HPI  Cirilo Alfaro is a 68 y.o. male has ome back as a follow up. He has RLE DVT on Eliquis. He is on 2 L.min of oxygen. He has a 60 pk yr smoking and still smoking 1 pk.day. He is on Breztri and Albuterol prn. He has occasional cough, phlegm-clear, no hemoptysis, no loss of weight, good appetite,SOBOE. He had no flu vaccine. His PFT done on 09/27/22 showed that his FEV1 ios 0.57 litres(20%) down from 0.61 litres, ratio, 32 and DLOC is down from 26% to 19%. His CT chest done on 08/23/222 showed:      Stable examination with fibrosis and volume loss in the superior segment of   the right lower lobe suggesting stable post treatment changes. No evidence   of progression of disease. No evidence of metastatic disease. Findings of COPD and emphysema, stable. Stable left adrenal nodule and right adrenal gland thickening, suggesting   adrenal adenomas and hyperplasia given unchanged appearance since 2018     Current Outpatient Medications   Medication Sig Dispense Refill    BREZTRI AEROSPHERE 160-9-4.8 MCG/ACT AERO Inhale 2 puffs into the lungs daily daily 3 each 3    apixaban (ELIQUIS) 5 MG TABS tablet Take 2 tablets by mouth 2 times daily Decrease to 1 tab po bid after 6 days. 180 tablet 5    albuterol sulfate HFA (PROVENTIL;VENTOLIN;PROAIR) 108 (90 Base) MCG/ACT inhaler Inhale 2 puffs into the lungs every 4 hours as needed for Wheezing or Shortness of Breath 3 each 3    metoprolol succinate (TOPROL XL) 25 MG extended release tablet Take 1 tablet by mouth in the morning.  30 tablet 3    aspirin 81 MG chewable tablet Take 1 tablet by mouth daily 30 tablet 3    atorvastatin (LIPITOR) 10 MG tablet Take 1 tablet by mouth daily 90 tablet 1    levothyroxine (SYNTHROID) 75 MCG tablet TAKE 1 TABLET BY MOUTH EVERY DAY 90 tablet 1    nicotine (NICODERM CQ) 21 MG/24HR Place 1 patch onto the skin daily 30 patch 3    Ipratropium-Albuterol (ALBUTEROL-IPRATROPIUM IN) Inhale 2 puffs into the lungs 4 times daily as needed       OXYGEN Inhale 2 L into the lungs nightly       Multiple Vitamins-Minerals (PRESERVISION AREDS PO) Take by mouth daily       No current facility-administered medications for this visit.        No Known Allergies    Past Medical History:   Diagnosis Date    Acute deep vein thrombosis (DVT) of right popliteal vein [facility])     admit 5/22- Dr Indio Peter and Dr Jeffrey Dixon to determine duration of anticoagulation( 6-12mo)    Atherosclerosis of aortic arch ([facility]     NOTED ON CXR 1/2017- CONSIDER CARDIO CONSULT    COPD (chronic obstructive pulmonary disease) ([facility] [address]     follow with Dr Yordan Valencia- chantix ineffective    COPD, severe ([facility] [address] 03/14/2022    Essential hypertension 01/29/2018    Full dentures     History of external beam radiation therapy 2009/2019    pelvis/anus in 2009 and right lower lobe lung mass [facility] 2019 [facility]    Hyperlipidemia, mixed     Hypertension     Hypothyroidism due to acquired atrophy of thyroid 05/11/2021    ANTIPEROXIDASE ANTIBODY NEGATIVE    On home oxygen therapy     2l/nc at night and prn    PAD (peripheral artery disease) ([facility] [address] [facility]     R leg on doppler 8/2017-- referring to Dr Jeffrey Dixon    Primary lung squamous cell carcinoma, right ([facility] [address] [facility] 12/2018    Dr Indio Peter, Dr Yordan Valencia, Dr Moore Payment- treated w RT,    Pulmonary nodule, left 07/2018    Rectal cancer [facility]) 2009    Dr Faisal Sanabria oncology, seen by GI at [facility]- Dr Zenaida Del Real- tx with chemo and radiation     S/P colonoscopy 02/01/2017    Dr Jay Hampton at [facility]-  diverticulosis and angioectasis, being referred to surgeon--CONSIDER RECHECK 5 YRS    SCC (squamous cell carcinoma of lung), right [facility])     Dr Indio Peter, periodically Dr Moore Payment.  had resection w Dr Jeffrey Dixon    Subclavian artery stenosis, right [facility])     Wears glasses        Past Surgical History:   Procedure Laterality Date ANGIOPLASTY Bilateral 2017    Dr Salma Levy for bilat leg PVD    CATARACT REMOVAL WITH IMPLANT Left 2018    Dr Heather Montgomery  2009    Dr Bonilla Pichardo then needed revision at [address]  last one 2017    [address]  2009    LUNG BIOPSY  12/07/2018    LYMPH NODE BIOPSY  07/2009    right axilla    TONSILLECTOMY  age 11       Social History     Socioeconomic History    Marital status:      Spouse name: None    Number of children: 6    Years of education: None    Highest education level: None   Occupational History    Occupation: retired   Tobacco Use    Smoking status: Every Day     Packs/day: 1.00     Years: 60.00     Pack years: 60.00     Types: Cigarettes    Smokeless tobacco: Never   Vaping Use    Vaping Use: Former   Substance and Sexual Activity    Alcohol use: Yes     Alcohol/week: 20.0 standard drinks     Types: 20 Cans of beer per week     Comment: average\"3 beers per day\" per pt on 12/6/2018    Drug use: No   Social History Narrative    6 childrean total ; strong, supportive family. Family does grocery shopping, clean home and prepare meals. Social Determinants of Health     Financial Resource Strain: Low Risk     Difficulty of Paying Living Expenses: Not hard at all   Food Insecurity: No Food Insecurity    Worried About [address] in the Last Year: Never true    [address]  in the Last Year: Never true   Transportation Needs: No Transportation Needs    Lack of Transportation (Medical): No    Lack of Transportation (Non-Medical):  No   Physical Activity: Insufficiently Active    Days of Exercise per Week: 3 days    Minutes of Exercise per Session: 10 min   Stress: No Stress Concern Present    Feeling of Stress : Not at all   Social Connections: Unknown    Frequency of Communication with Friends and Family: Twice a week    Frequency of Social Gatherings with Friends and Family: Once a week    Attends Temple Services: Never    Active Member of Clubs or Organizations: No    Attends Club or Organization Meetings: Never   Housing Stability: Low Risk     Unable to Pay for Housing in the Last Year: No    Number of [place] in the Last Year: 1    Unstable Housing in the Last Year: No       Review of Systems   Constitutional:  Negative for fatigue. HENT:  Negative for congestion and postnasal drip. Eyes:  Negative for discharge and itching. Respiratory:  Positive for cough and shortness of breath. Cardiovascular:  Negative for chest pain and leg swelling. Gastrointestinal:  Negative for abdominal distention and abdominal pain. Endocrine: Negative for cold intolerance and heat intolerance. Genitourinary:  Negative for enuresis and frequency. Musculoskeletal:  Negative for arthralgias and back pain. Allergic/Immunologic: Negative for environmental allergies and food allergies. Neurological:  Negative for light-headedness and headaches. Hematological:  Negative for adenopathy. Psychiatric/Behavioral:  Negative for agitation and behavioral problems. Objective:   /74   Pulse 80   Ht 5' 5.5\" (1.664 m)   Wt 110 lb 12.8 oz (50.3 kg)   SpO2 95%   BMI 18.16 kg/m²   Body mass index is 18.16 kg/m². Sleep Medicine 7/2/2018   Sitting and reading 0   Watching TV 1   Sitting, inactive in a public place (e.g. a theatre or a meeting) 1   As a passenger in a car for an hour without a break 1   Lying down to rest in the afternoon when circumstances permit 3   Sitting and talking to someone 0   Sitting quietly after a lunch without alcohol 1   In a car, while stopped for a few minutes in traffic 0   [place] Sleepiness Score 7   Neck circumference (Inches) 16     Mallampati 2    Physical Exam  Vitals reviewed. Constitutional:       Appearance: Normal appearance. HENT:      Head: Normocephalic and atraumatic.       Nose: Nose normal.      Mouth/Throat:      Mouth: Mucous membranes are moist.   Eyes:      Extraocular Movements: Extraocular movements intact. Pupils: Pupils are equal, round, and reactive to light. Cardiovascular:      Rate and Rhythm: Normal rate and regular rhythm. Pulses: Normal pulses. Heart sounds: Normal heart sounds. Pulmonary:      Effort: Pulmonary effort is normal.      Breath sounds: Normal breath sounds. Abdominal:      General: Abdomen is flat. Palpations: Abdomen is soft. Musculoskeletal:         General: Normal range of motion. Cervical back: Normal range of motion and neck supple. Skin:     General: Skin is warm and dry. Neurological:      General: No focal deficit present. Mental Status: He is alert and oriented to person, place, and time. Psychiatric:         Mood and Affect: Mood normal.         Behavior: Behavior normal.       Radiology: Reviewed    Assessment and Plan     Problem List          Circulatory    Deep vein thrombosis (DVT) of popliteal vein (HCC)      Patient is on Eliquis            Respiratory    SCC (squamous cell carcinoma of lung), right (HCC)      It has been stable'  Patient is being followed with Dr. Borjas Solid         Relevant Medications    aspirin 81 MG chewable tablet    COPD, severe ([address]      Advised to quit smoking  C.w Inhalers  PFT in 1 year  Get yearly flu vaccine         Relevant Medications    Ipratropium-Albuterol (ALBUTEROL-IPRATROPIUM IN)    nicotine (NICODERM CQ) 21 MG/24HR    albuterol sulfate HFA (PROVENTIL;VENTOLIN;PROAIR) 108 (90 Base) MCG/ACT inhaler    BREZTRI AEROSPHERE 160-9-4.8 MCG/ACT AERO    Other Relevant Orders    Full PFT Study With Bronchodilator    6 Minute Walk Test       Other    Cigarette smoker      Advised to quit smoking         Relevant Orders    Full PFT Study With Bronchodilator    6 Minute Walk Test    SOB (shortness of breath) on exertion      Advised to quit smoking  C/w Inhalers  Get yearly flu vaccine                   Follow-Up:    Return in about 1 year (around 9/27/2023) for 6 MWT, PFT.      Progress notes sent to the referring Provider    Addi Payne MD MD  9/27/2022  1:36 PM

## 2022-10-10 ENCOUNTER — OFFICE VISIT (OUTPATIENT)
Dept: INTERNAL MEDICINE CLINIC | Age: 77
End: 2022-10-10
Payer: MEDICARE

## 2022-10-10 VITALS
SYSTOLIC BLOOD PRESSURE: 110 MMHG | HEART RATE: 89 BPM | OXYGEN SATURATION: 97 % | WEIGHT: 113 LBS | RESPIRATION RATE: 29 BRPM | BODY MASS INDEX: 18.52 KG/M2 | DIASTOLIC BLOOD PRESSURE: 70 MMHG

## 2022-10-10 DIAGNOSIS — J96.11 CHRONIC RESPIRATORY FAILURE WITH HYPOXIA (HCC): ICD-10-CM

## 2022-10-10 DIAGNOSIS — E78.2 HYPERLIPIDEMIA, MIXED: ICD-10-CM

## 2022-10-10 DIAGNOSIS — J44.9 COPD, SEVERE (HCC): Primary | ICD-10-CM

## 2022-10-10 DIAGNOSIS — I10 ESSENTIAL HYPERTENSION: ICD-10-CM

## 2022-10-10 DIAGNOSIS — E03.4 HYPOTHYROIDISM DUE TO ACQUIRED ATROPHY OF THYROID: ICD-10-CM

## 2022-10-10 DIAGNOSIS — C34.91 PRIMARY LUNG SQUAMOUS CELL CARCINOMA, RIGHT (HCC): ICD-10-CM

## 2022-10-10 LAB
A/G RATIO: 2.7 (ref 1.1–2.2)
ALBUMIN SERPL-MCNC: 4.3 G/DL (ref 3.4–5)
ALP BLD-CCNC: 76 U/L (ref 40–129)
ALT SERPL-CCNC: 12 U/L (ref 10–40)
ANION GAP SERPL CALCULATED.3IONS-SCNC: 10 MMOL/L (ref 3–16)
AST SERPL-CCNC: 17 U/L (ref 15–37)
BASOPHILS ABSOLUTE: 0.1 K/UL (ref 0–0.2)
BASOPHILS RELATIVE PERCENT: 1.2 %
BILIRUB SERPL-MCNC: 0.4 MG/DL (ref 0–1)
BUN BLDV-MCNC: 12 MG/DL (ref 7–20)
CALCIUM SERPL-MCNC: 8.7 MG/DL (ref 8.3–10.6)
CHLORIDE BLD-SCNC: 100 MMOL/L (ref 99–110)
CHOLESTEROL, TOTAL: 123 MG/DL (ref 0–199)
CO2: 31 MMOL/L (ref 21–32)
CREAT SERPL-MCNC: 0.8 MG/DL (ref 0.8–1.3)
EOSINOPHILS ABSOLUTE: 0.1 K/UL (ref 0–0.6)
EOSINOPHILS RELATIVE PERCENT: 1.1 %
GFR AFRICAN AMERICAN: >60
GFR NON-AFRICAN AMERICAN: >60
GLUCOSE BLD-MCNC: 115 MG/DL (ref 70–99)
HCT VFR BLD CALC: 39.6 % (ref 40.5–52.5)
HDLC SERPL-MCNC: 81 MG/DL (ref 40–60)
HEMOGLOBIN: 12.9 G/DL (ref 13.5–17.5)
LDL CHOLESTEROL CALCULATED: 15 MG/DL
LYMPHOCYTES ABSOLUTE: 0.9 K/UL (ref 1–5.1)
LYMPHOCYTES RELATIVE PERCENT: 16.8 %
MCH RBC QN AUTO: 30.2 PG (ref 26–34)
MCHC RBC AUTO-ENTMCNC: 32.7 G/DL (ref 31–36)
MCV RBC AUTO: 92.5 FL (ref 80–100)
MONOCYTES ABSOLUTE: 0.5 K/UL (ref 0–1.3)
MONOCYTES RELATIVE PERCENT: 9 %
NEUTROPHILS ABSOLUTE: 3.7 K/UL (ref 1.7–7.7)
NEUTROPHILS RELATIVE PERCENT: 71.9 %
PDW BLD-RTO: 14.4 % (ref 12.4–15.4)
PLATELET # BLD: 249 K/UL (ref 135–450)
PMV BLD AUTO: 8.9 FL (ref 5–10.5)
POTASSIUM SERPL-SCNC: 4.7 MMOL/L (ref 3.5–5.1)
RBC # BLD: 4.28 M/UL (ref 4.2–5.9)
SODIUM BLD-SCNC: 141 MMOL/L (ref 136–145)
T4 FREE: 1.6 NG/DL (ref 0.9–1.8)
TOTAL PROTEIN: 5.9 G/DL (ref 6.4–8.2)
TRIGL SERPL-MCNC: 134 MG/DL (ref 0–150)
TSH SERPL DL<=0.05 MIU/L-ACNC: 2.18 UIU/ML (ref 0.27–4.2)
VLDLC SERPL CALC-MCNC: 27 MG/DL
WBC # BLD: 5.2 K/UL (ref 4–11)

## 2022-10-10 PROCEDURE — G8420 CALC BMI NORM PARAMETERS: HCPCS | Performed by: INTERNAL MEDICINE

## 2022-10-10 PROCEDURE — 99214 OFFICE O/P EST MOD 30 MIN: CPT | Performed by: INTERNAL MEDICINE

## 2022-10-10 PROCEDURE — 4004F PT TOBACCO SCREEN RCVD TLK: CPT | Performed by: INTERNAL MEDICINE

## 2022-10-10 PROCEDURE — 3023F SPIROM DOC REV: CPT | Performed by: INTERNAL MEDICINE

## 2022-10-10 PROCEDURE — 1123F ACP DISCUSS/DSCN MKR DOCD: CPT | Performed by: INTERNAL MEDICINE

## 2022-10-10 PROCEDURE — G8427 DOCREV CUR MEDS BY ELIG CLIN: HCPCS | Performed by: INTERNAL MEDICINE

## 2022-10-10 PROCEDURE — G8484 FLU IMMUNIZE NO ADMIN: HCPCS | Performed by: INTERNAL MEDICINE

## 2022-10-10 PROCEDURE — 36415 COLL VENOUS BLD VENIPUNCTURE: CPT | Performed by: INTERNAL MEDICINE

## 2022-10-10 NOTE — PROGRESS NOTES
Braeden Kidney  1945  10/10/22    SUBJECTIVE:  has a prior DVT so now on eliquis chronically. Also checked by Dr Beatriz Porter for u/s last week and to d/w her results this week. Chr swollen in legs. COPD has been stable without any recent cough or wheezing, SOB, fevers, chills. On home O2     Hypothyroid has been asymptomatic w/o sx of fatigue, constipation, cold intolerance, depression. Lipids:  Is continuing statin therapy and low fat diet. Tolerating medications w/o myalgias or GI upset. Lung ca and also seeing Dr Amarjit Lyman. Also had prior colectomy w Dr Tamiko Carrion 2009 for rectal CA. Has had both flu shot and covid booster 10/6    OBJECTIVE:    /70   Pulse 89   Resp 29   Wt 113 lb (51.3 kg)   SpO2 97%   BMI 18.52 kg/m²     Physical Exam  Vitals reviewed. Constitutional:       General: He is not in acute distress. Appearance: He is well-developed. HENT:      Head: Normocephalic and atraumatic. Eyes:      General: No scleral icterus. Right eye: No discharge. Left eye: No discharge. Conjunctiva/sclera: Conjunctivae normal.      Pupils: Pupils are equal, round, and reactive to light. Neck:      Thyroid: No thyromegaly. Vascular: No JVD. Trachea: No tracheal deviation. Cardiovascular:      Rate and Rhythm: Normal rate and regular rhythm. Heart sounds: Normal heart sounds. No murmur heard. No friction rub. No gallop. Pulmonary:      Effort: Pulmonary effort is normal. No respiratory distress. Breath sounds: Normal breath sounds. No wheezing or rales. Abdominal:      General: Bowel sounds are normal. There is no distension. Palpations: Abdomen is soft. There is no mass. Tenderness: no abdominal tenderness There is no guarding or rebound. Musculoskeletal:      Cervical back: Normal range of motion and neck supple. Lymphadenopathy:      Cervical: No cervical adenopathy. Skin:     General: Skin is warm and dry. Findings: No rash. Neurological:      Mental Status: He is alert. Psychiatric:         Mood and Affect: Mood normal.       ASSESSMENT:    1. COPD, severe ([address]    2. Chronic respiratory failure with hypoxia (HCC)    3. Primary lung squamous cell carcinoma, right ([address]    4. Essential hypertension    5. Hyperlipidemia, mixed    6. Hypothyroidism due to acquired atrophy of thyroid        PLAN:    Linnea Del Castillo was seen today for hypertension, cholesterol problem and copd. Diagnoses and all orders for this visit:    COPD, severe ([address] - COPD stable on current regiment of inhalers/meds. Chronic respiratory failure with hypoxia (HCC)- stable on home O2    Primary lung squamous cell carcinoma, right (HCC)- cont surveillance/monitoring w Dr Sagar Knight    Essential hypertension - Blood pressure stable and will continue current regimen. Will plan periodic monitoring of renal function, electrolytes, lipid profile. -     Comprehensive Metabolic Panel; Future  -     CBC with Auto Differential; Future  -     Lipid Panel; Future  -     Lipid Panel  -     CBC with Auto Differential  -     Comprehensive Metabolic Panel    Hyperlipidemia, mixed  - Pt will continue to work on a low fat diet and also exercise, wt loss as appropriate. Will continue periodic monitoring of fasting lipid profile, glucose, liver function. -     Comprehensive Metabolic Panel; Future  -     CBC with Auto Differential; Future  -     Lipid Panel; Future  -     Lipid Panel  -     CBC with Auto Differential  -     Comprehensive Metabolic Panel    Hypothyroidism due to acquired atrophy of thyroid  - Clinically hypothyroidism appears stable and will continue current dosing, also periodic monitoring of TFTs.    -     TSH;  Future  -     T4, Free; Future  -     T4, Free  -     TSH

## 2022-10-11 NOTE — RESULT ENCOUNTER NOTE
Call pt, labs ok/chol ok AND THYROID FXN IS ALSO NL RANGE. HIS ANEMIA LAST TIME IS ALSO MUCH BETTER W HGB RISING FROM 10.8-12. 9.

## 2022-10-14 RX ORDER — METOPROLOL SUCCINATE 25 MG/1
TABLET, EXTENDED RELEASE ORAL
Qty: 90 TABLET | Refills: 1 | Status: SHIPPED | OUTPATIENT
Start: 2022-10-14

## 2022-10-31 DIAGNOSIS — E03.4 HYPOTHYROIDISM DUE TO ACQUIRED ATROPHY OF THYROID: ICD-10-CM

## 2022-10-31 DIAGNOSIS — I70.0 ATHEROSCLEROSIS OF AORTIC ARCH (HCC): ICD-10-CM

## 2022-10-31 DIAGNOSIS — E78.2 HYPERLIPIDEMIA, MIXED: ICD-10-CM

## 2022-10-31 RX ORDER — ATORVASTATIN CALCIUM 10 MG/1
TABLET, FILM COATED ORAL
Qty: 90 TABLET | Refills: 1 | Status: SHIPPED | OUTPATIENT
Start: 2022-10-31

## 2022-10-31 RX ORDER — LEVOTHYROXINE SODIUM 0.07 MG/1
TABLET ORAL
Qty: 90 TABLET | Refills: 1 | Status: SHIPPED | OUTPATIENT
Start: 2022-10-31

## 2022-11-02 RX ORDER — ASPIRIN 81 MG/1
81 TABLET, CHEWABLE ORAL DAILY
Qty: 30 TABLET | Refills: 3 | Status: SHIPPED | OUTPATIENT
Start: 2022-11-02

## 2022-11-17 ENCOUNTER — HOSPITAL ENCOUNTER (OUTPATIENT)
Dept: PULMONOLOGY | Age: 77
Discharge: HOME | End: 2022-11-17
Payer: MEDICARE

## 2022-11-17 LAB
BASE EXCESS MIXED: 2 (ref 0–1.2)
CARBON MONOXIDE, BLOOD: 5.9 % (ref 0–5)
CO2 CONTENT: 26.7 MMOL/L (ref 19–24)
COMMENT: ABNORMAL
HCO3 ARTERIAL: 25.6 MMOL/L (ref 18–23)
METHEMOGLOBIN ARTERIAL: 1.2 %
O2 SATURATION: 92 % (ref 96–97)
PCO2 ARTERIAL: 36 MMHG (ref 32–45)
PH BLOOD: 7.46 (ref 7.34–7.45)
PO2 ARTERIAL: 87 MMHG (ref 75–100)

## 2022-11-17 PROCEDURE — 36600 WITHDRAWAL OF ARTERIAL BLOOD: CPT

## 2022-11-17 PROCEDURE — 82803 BLOOD GASES ANY COMBINATION: CPT

## 2023-02-03 RX ORDER — ASPIRIN 81 MG
TABLET,CHEWABLE ORAL
Qty: 90 TABLET | Refills: 1 | Status: SHIPPED | OUTPATIENT
Start: 2023-02-03

## 2023-02-08 DIAGNOSIS — J42 CHRONIC BRONCHITIS, UNSPECIFIED CHRONIC BRONCHITIS TYPE (HCC): ICD-10-CM

## 2023-02-08 RX ORDER — ALBUTEROL SULFATE 90 UG/1
AEROSOL, METERED RESPIRATORY (INHALATION)
Qty: 6.7 EACH | Refills: 5 | Status: SHIPPED | OUTPATIENT
Start: 2023-02-08

## 2023-02-22 RX ORDER — METOPROLOL SUCCINATE 25 MG/1
TABLET, EXTENDED RELEASE ORAL
Qty: 90 TABLET | Refills: 1 | OUTPATIENT
Start: 2023-02-22

## 2023-02-24 ENCOUNTER — HOSPITAL ENCOUNTER (OUTPATIENT)
Age: 78
Discharge: HOME | End: 2023-02-24
Payer: MEDICARE

## 2023-02-24 ENCOUNTER — HOSPITAL ENCOUNTER (OUTPATIENT)
Dept: GENERAL RADIOLOGY | Age: 78
Discharge: HOME | End: 2023-02-24
Payer: MEDICARE

## 2023-02-24 DIAGNOSIS — C34.31 MALIGNANT NEOPLASM OF LOWER LOBE OF RIGHT LUNG (HCC): ICD-10-CM

## 2023-02-24 PROCEDURE — 71046 X-RAY EXAM CHEST 2 VIEWS: CPT

## 2023-02-26 NOTE — PROGRESS NOTES
Patient Name: Sujata Hidalgo  Patient : 1945  Patient MRN: [id number]     Primary Oncologist: Marin Gutiérrez MD  Referring Provider: Velma Tinsley MD     Date of Service: 3/2/2023      Chief Complaint:   Chief Complaint   Patient presents with    Follow-up     Patient Active Problem List:     Malignant neoplasm of lower lobe of right lung      SCC (squamous cell carcinoma of lung), right     HPI:   Mr. Antonietta Farris is a 41-year-old very pleasant gentleman with medical history significant for hypertension, COPD, hyperlipidemia, peripheral arterial disease, coronary artery disease, history of anal canal squamous cell carcinoma, status post chemoradiation therapy (completed in 2009), initially referred to me on 2018 for evaluation of clinical stage IA1 right lower lobe squamous cell lung cancer. He stated that he has screening low dose CT scan of the chest (ordered by Dr. Judi Andersen) on 2018 and it showed 10 mm ground glass left lower lobe nodule, which is new since . Subsequently he had PET/CT scan on 18 and it showed metabolically active 5 mm nodule in the right lower lobe, potentially malignant. There was no FDG activity associated with the ground glass left lower lobe nodule seen on prior CT scan. Repeat CT scan on 18 showed increasing irregular right lower lobe nodule 7.9 x 7.2 mm, considered neoplastic until proven otherwise. Resolution of left lower lobe ground glass density. He subsequently underwent CT guided biopsy of right lower lobe lung mass on 18 and final pathology was consistent with squamous cell carcinoma. He was subsequently referred to me for evaluation. PET/CT scan done on 2018 showed slightly increased size and increased uptake of the biopsy-proven malignancy in the right lower lobe. No findings of metastatic disease.     MRI of the brain with and without contrast done on 1/3/19 showed No acute intracranial abnormality. No intracranial metastatic disease. 2. Age-related parenchymal volume loss and mild chronic microvascular ischemic changes. He was seen by cardio thoracic surgeon and they don't think he is a candidate for surgery. He was subsequently seen by Dr. Beulah Booker and he was treated with stereotactic body radiation therapy between February 19 and February 26, 2019. CT chest done on March 4, 2022 showed slightly increasing consolidation within the superior segment of the right lower lobe with adjacent trace pleural effusion and volume loss likely evolving posttreatment changes/fibrosis. Slight interval decrease in size of a 6 mm groundglass nodule in the anterior inferior right upper lobe with interval resolution of nodule in the left upper lobe. He developed nonocclusive acute common femoral veing DVT, moderate non occlusive acute popliteal vein DVT on 5/22/22. He has been on eliquis since then. I believe his VTE is likely secondary to immobility related to dyspnea associated with COPD. CT chest done on 5/22/2022 showed stable right lower lobe superior segment masslike consolidation and architectural distortion. No definite evidence of malignant disease progression. Acute bilateral lower lobe progressive bronchial thickening and endobronchial debris with patchy bilateral lower lobe basilar mild consolidations suggesting acute bronchopneumonia with progressive mucous plugging or chronic bronchitis. CT chest done on 8/23/2022 showed stable exam with fibrosis and volume loss in the superior segment of the right lower lobe suggesting stable posttreatment changes. No evidence of progression of disease. No evidence of metastatic disease. Stable left adrenal nodule and right adrenal gland thickening, suggesting adrenal adenomas and hyperplasia given unchanged appearance since 2018. Chest x-ray done on 2/24/2023 showed probable acute right perihilar infiltrate superimposed on chronic changes. Recommend follow-up imaging to confirm resolution. On March 2, 2023, he presented to me for follow-up. I have been following him for Stage IA1 right lower lobe squamous cell lung cancer and he is status post stereotactic body radiation therapy. He has been under close observation since then. Reviewed with him findings of Chest X ray done on 2/24/23 and we looked at his x ray together. I agreed with radiologist and changes we saw was most likely superimposed infection. I recommend him to have repeat CT chest in 3 months to ensure resolution. Since it has been 3 years now, I recommend him to have chest X ray in 6 months to make sure that it is not progression of tumor. Lower extremity DVT - due to decreased mobility from COPD and SOB. Will keep anticoagulation therapy for now since he still has sedentary life style. Hypertension - his BP is normal. Recommend to continue with metoprolol. Hypothyroidism - stable and recommend to continue with levothyroxine 75 mcg daily. COPD - still smoking. Encourage him to quit smoking. To continue with symbicort and bronchodilator nebulizer. Hyperlipidemia - stable on lipitor 10 mg daily. Health maintenance - I recommend him to have age-appropriate cancer screening, exercise, low-fat and low-sodium diet. He does not have any significant symptoms at today's visit. Past Medical History  Significant for  1. Hypertension  2. Hyperlipidemia  3. COPD  4. Peripheral arterial disease  5. Coronary artery disease  6. History of anal canal squamous cell carcinoma, status post concurrent chemoradiation therapy (on remission)    Surgical History  Significant for   1. Hemorrhoid surgery  2. Chemoport placement  3. Cataract surgery  4. Tonsillectomy  5. Colectomy in 2009 by Dr. Aleta Dumont  6.  Angioplasty of bilateral legs by Dr. Twan Sun    Allergies  No known medication allergies    Social History  He is a current smoker and he smokes approximately 1 pack/day for last 60 years. He drinks alcohol daily and denies illicit drug abuse. He is currently living in [facility]. Family History  Significant for pancreatic cancer in 1 of his brother. No other pertinent family history. Oncology History   Malignant neoplasm of lower lobe of right lung ([facility] [address]   1/8/2019 Initial Diagnosis    Malignant neoplasm of lower lobe of right lung ([facility] [address]     2/19/2019 - 2/26/2019 Radiation    Right lower lobe: 54 trey in 3 fractions SBRT       Review of Systems: \"Per interval history; otherwise 10 point ROS is negative. \"  His energy level is pretty good today and his sleep is fine. He doesn't have fever, chills, night sweats, cough, shortness of breath, chest pain, hemoptysis or palpitations. His bowel and bladder functions are normal. He denies nausea, vomiting, abdominal pain, diarrhea, constipation, dysuria, loss of appetite or weight loss. He doesn't have neuropathy and he denies bleeding or clotting issues. He doesn't have any pain in his body. No anxiety or depression. The rest of the systems are unremarkable.     Vital Signs:  BP 96/65 (Site: Right Upper Arm, Position: Sitting, Cuff Size: Medium Adult)   Pulse 83   Temp 97.4 °F (36.3 °C) (Infrared)   Resp 18   Ht 5' 6\" (1.676 m)   Wt 113 lb (51.3 kg)   SpO2 94%   BMI 18.24 kg/m²     Physical Exam:  CONSTITUTIONAL: awake, no apparent distress, alert, cooperative,   EYES: pupils equal, round and reactive to light, sclera clear and conjunctiva normal  ENT: Normocephalic, without obvious abnormality, atraumatic  NECK: supple, symmetrical, no jugular venous distension and no carotid bruits   HEMATOLOGIC/LYMPHATIC: no cervical, supraclavicular or axillary lymphadenopathy   LUNGS: VBS, no wheezes, clear to auscultation, no rhonchi, no increased work of breathing, no crackles,    CARDIOVASCULAR: regular rate and rhythm, normal S1 and S2, no murmur noted  ABDOMEN: soft, non-distended, normal bowel sounds x 4, non-tender, no masses palpated, no hepatosplenomegaly   MUSCULOSKELETAL: full range of motion noted, tone is normal  NEUROLOGIC: awake, alert, oriented to name, place and time. Motor skills grossly intact. SKIN: Normal skin color, texture, turgor and no jaundice.  appears intact   EXTREMITIES: no cyanosis, no clubbing, no LE edema, no leg swelling,      Labs:  Hematology:  Lab Results   Component Value Date    WBC 5.2 10/10/2022    RBC 4.28 10/10/2022    HGB 12.9 (L) 10/10/2022    HCT 39.6 (L) 10/10/2022    MCV 92.5 10/10/2022    MCH 30.2 10/10/2022    MCHC 32.7 10/10/2022    RDW 14.4 10/10/2022     10/10/2022    MPV 8.9 10/10/2022    BANDSPCT 5 02/27/2012    SEGSPCT 78.0 (H) 05/24/2022    EOSRELPCT 1.1 10/10/2022    BASOPCT 1.2 10/10/2022    LYMPHOPCT 16.8 10/10/2022    MONOPCT 9.0 10/10/2022    BANDABS 0.32 02/27/2012    SEGSABS 3.9 05/24/2022    EOSABS 0.1 10/10/2022    BASOSABS 0.1 10/10/2022    LYMPHSABS 0.9 (L) 10/10/2022    MONOSABS 0.5 10/10/2022    DIFFTYPE AUTOMATED DIFFERENTIAL 05/24/2022    PLTM FEW LARGE PLATELET FORMS SEEN 51/07/5641     No results found for: ESR  Chemistry:  Lab Results   Component Value Date     10/10/2022    K 4.7 10/10/2022     10/10/2022    CO2 31 10/10/2022    BUN 12 10/10/2022    CREATININE 0.8 10/10/2022    GLUCOSE 115 (H) 10/10/2022    CALCIUM 8.7 10/10/2022    PROT 5.9 (L) 10/10/2022    LABALBU 4.3 10/10/2022    BILITOT 0.4 10/10/2022    ALKPHOS 76 10/10/2022    AST 17 10/10/2022    ALT 12 10/10/2022    LABGLOM >60 10/10/2022    GFRAA >60 10/10/2022    AGRATIO 2.7 (H) 10/10/2022    GLOB 1.9 05/10/2021    POCGLU 171 (H) 07/22/2021     No results found for: MMA, LDH, HOMOCYSTEINE  No components found for: LD  Lab Results   Component Value Date    TSHHS 4.200 12/01/2020    T4FREE 1.6 10/10/2022     Immunology:  Lab Results   Component Value Date    PROT 5.9 (L) 10/10/2022     No results found for: Jimbo Peñaloza, CARMINER  No results found for: B2M  Coagulation Panel:  Lab Results   Component Value Date    PROTIME 11.2 (L) 07/22/2021    INR 0.87 07/22/2021    APTT 34.1 05/24/2022     Anemia Panel:  Lab Results   Component Value Date    PPSHGDUZ99 288.2 05/22/2022    FOLATE 14.4 05/22/2022     Tumor Markers:  No results found for: , CEA, , LABCA2, PSA  Observations:  PHQ-9 Total Score: 0 (3/2/2023  9:05 AM)     Assessment & Plan:   Stage IA1 right lower lobe squamous cell lung cancer    PLAN  Mr. Maura Gee is a 67year old very pleasant gentleman who was found to have 1 cm ground glass nodule in his left lower lobe on screening low dose CT scan, done on July 20, 2018. Subsequently, he had PET/CT scan and it showed metabolically active 5 mm nodule in right lower lobe, potentially malignant. No metabolic activity in left lower lobe ground glass nodule. Repeat CT scan of the chest on November 14, 2018 showed increased in size of right lower lobe lung mass, considered neoplastic until proven otherwise. Subsequently had CT guided biopsy of the right lower lobe lung mass on Decmeber 7, 2018 and final pathology was consistent with squamous cell carcinoma. PET/CT scan done on December 27, 2018 showed slightly increased size and increased uptake of the biopsy-proven malignancy in the right lower lobe. No findings of metastatic disease. MRI of the brain with and without contrast done on 1/3/19 showed No acute intracranial abnormality. No intracranial metastatic disease. 2. Age-related parenchymal volume loss and mild chronic microvascular ischemic changes. He was seen by cardio thoracic surgeon and they don't think he is a candidate for surgery. He was subsequently seen by Dr. Yesy Barrios and he was treated with stereotactic body radiation therapy between February 19 and February 26, 2019. He has been in a close observation since then.     CT chest with contrast done on September 7, 2021 showed similar posttreatment change in the right lower lobe without evidence of disease. Unchanged 8 mm groundglass nodule of the right upper lobe and increased in size now 1.2 cm groundglass nodule of the left upper lobe. CT chest done on March 4, 2022 showed slightly increasing consolidation within the superior segment of the right lower lobe with adjacent trace pleural effusion and volume loss likely evolving posttreatment changes/fibrosis. Slight interval decrease in size of a 6 mm groundglass nodule in the anterior inferior right upper lobe with interval resolution of nodule in the left upper lobe. He developed nonocclusive acute common femoral veing DVT, moderate non occlusive acute popliteal vein DVT on 5/22/22. He has been on eliquis since then. I believe his VTE is likely secondary to immobility related to dyspnea associated with COPD. CT chest done on 5/22/2022 showed stable right lower lobe superior segment masslike consolidation and architectural distortion. No definite evidence of malignant disease progression. Acute bilateral lower lobe progressive bronchial thickening and endobronchial debris with patchy bilateral lower lobe basilar mild consolidations suggesting acute bronchopneumonia with progressive mucous plugging or chronic bronchitis. CT chest done on 8/23/2022 showed stable exam with fibrosis and volume loss in the superior segment of the right lower lobe suggesting stable posttreatment changes. No evidence of progression of disease. No evidence of metastatic disease. Stable left adrenal nodule and right adrenal gland thickening, suggesting adrenal adenomas and hyperplasia given unchanged appearance since 2018. Chest x-ray done on 2/24/2023 showed probable acute right perihilar infiltrate superimposed on chronic changes. Recommend follow-up imaging to confirm resolution. On March 2, 2023, he presented to me for follow-up.  I have been following him for Stage IA1 right lower lobe squamous cell lung cancer and he is status post stereotactic body radiation therapy. He has been under close observation since then. Reviewed with him findings of Chest X ray done on 2/24/23 and we looked at his x ray together. I agreed with radiologist and changes we saw was most likely superimposed infection. I recommend him to have repeat CT chest in 3 months to ensure resolution. Since it has been 3 years now, I recommend him to have chest X ray in 6 months to make sure that it is not progression of tumor. Lower extremity DVT - due to decreased mobility from COPD and SOB. Will keep anticoagulation therapy for now since he still has sedentary life style. Hypertension - his BP is normal. Recommend to continue with metoprolol. Hypothyroidism - stable and recommend to continue with levothyroxine 75 mcg daily. COPD - still smoking. Encourage him to quit smoking. To continue with symbicort and bronchodilator nebulizer. Hyperlipidemia - stable on lipitor 10 mg daily. Health maintenance - I recommend him to have age-appropriate cancer screening, exercise, low-fat and low-sodium diet. I answered all his questions and concerns for today. Recent imaging and labs were reviewed and discussed with the patient.

## 2023-03-02 ENCOUNTER — OFFICE VISIT (OUTPATIENT)
Dept: ONCOLOGY | Age: 78
End: 2023-03-02
Payer: MEDICARE

## 2023-03-02 ENCOUNTER — HOSPITAL ENCOUNTER (OUTPATIENT)
Dept: INFUSION THERAPY | Age: 78
Discharge: HOME | End: 2023-03-02
Payer: MEDICARE

## 2023-03-02 VITALS
BODY MASS INDEX: 18.16 KG/M2 | WEIGHT: 113 LBS | HEART RATE: 83 BPM | OXYGEN SATURATION: 94 % | SYSTOLIC BLOOD PRESSURE: 96 MMHG | DIASTOLIC BLOOD PRESSURE: 65 MMHG | TEMPERATURE: 97.4 F | RESPIRATION RATE: 18 BRPM | HEIGHT: 66 IN

## 2023-03-02 DIAGNOSIS — C34.31 MALIGNANT NEOPLASM OF LOWER LOBE OF RIGHT LUNG (HCC): Primary | ICD-10-CM

## 2023-03-02 PROCEDURE — G8427 DOCREV CUR MEDS BY ELIG CLIN: HCPCS | Performed by: INTERNAL MEDICINE

## 2023-03-02 PROCEDURE — 99213 OFFICE O/P EST LOW 20 MIN: CPT | Performed by: INTERNAL MEDICINE

## 2023-03-02 PROCEDURE — G8419 CALC BMI OUT NRM PARAM NOF/U: HCPCS | Performed by: INTERNAL MEDICINE

## 2023-03-02 PROCEDURE — 4004F PT TOBACCO SCREEN RCVD TLK: CPT | Performed by: INTERNAL MEDICINE

## 2023-03-02 PROCEDURE — G8484 FLU IMMUNIZE NO ADMIN: HCPCS | Performed by: INTERNAL MEDICINE

## 2023-03-02 PROCEDURE — 1123F ACP DISCUSS/DSCN MKR DOCD: CPT | Performed by: INTERNAL MEDICINE

## 2023-03-02 PROCEDURE — 3074F SYST BP LT 130 MM HG: CPT | Performed by: INTERNAL MEDICINE

## 2023-03-02 PROCEDURE — 99211 OFF/OP EST MAY X REQ PHY/QHP: CPT

## 2023-03-02 PROCEDURE — 3078F DIAST BP <80 MM HG: CPT | Performed by: INTERNAL MEDICINE

## 2023-03-02 ASSESSMENT — PATIENT HEALTH QUESTIONNAIRE - PHQ9
SUM OF ALL RESPONSES TO PHQ QUESTIONS 1-9: 0
SUM OF ALL RESPONSES TO PHQ QUESTIONS 1-9: 0
2. FEELING DOWN, DEPRESSED OR HOPELESS: 0
1. LITTLE INTEREST OR PLEASURE IN DOING THINGS: 0
SUM OF ALL RESPONSES TO PHQ QUESTIONS 1-9: 0
SUM OF ALL RESPONSES TO PHQ QUESTIONS 1-9: 0
SUM OF ALL RESPONSES TO PHQ9 QUESTIONS 1 & 2: 0

## 2023-03-02 NOTE — PROGRESS NOTES
MA Rooming Questions  Patient: Josh Ward  MRN: [id number]    Date: 3/2/2023        1. Do you have any new issues?   no         2. Do you need any refills on medications?    no    3. Have you had any imaging done since your last visit? yes - XR 2/24    4. Have you been hospitalized or seen in the emergency room since your last visit here?   no    5. Did the patient have a depression screening completed today?  Yes    PHQ-9 Total Score: 0 (3/2/2023  9:05 AM)       PHQ-9 Given to (if applicable):               PHQ-9 Score (if applicable):                     [] Positive     [x]  Negative              Does question #9 need addressed (if applicable)                     [] Yes    []  No               Edwina Jj MA

## 2023-03-22 ENCOUNTER — HOSPITAL ENCOUNTER (OUTPATIENT)
Dept: CARDIAC REHAB | Age: 78
Setting detail: THERAPIES SERIES
Discharge: HOME | End: 2023-03-22

## 2023-03-24 ENCOUNTER — TRANSCRIBE ORDERS (OUTPATIENT)
Dept: ADMINISTRATIVE | Age: 78
End: 2023-03-24

## 2023-03-24 DIAGNOSIS — J44.9 OBSTRUCTIVE CHRONIC BRONCHITIS WITHOUT EXACERBATION (HCC): Primary | ICD-10-CM

## 2023-03-24 DIAGNOSIS — R05.9 COUGH, UNSPECIFIED TYPE: ICD-10-CM

## 2023-03-27 ENCOUNTER — HOSPITAL ENCOUNTER (OUTPATIENT)
Dept: CARDIAC REHAB | Age: 78
Setting detail: THERAPIES SERIES
Discharge: HOME | End: 2023-03-27
Payer: MEDICARE

## 2023-03-27 PROCEDURE — 94626 PHY/QHP OP PULM RHB W/MNTR: CPT

## 2023-03-30 ENCOUNTER — HOSPITAL ENCOUNTER (OUTPATIENT)
Dept: CARDIAC REHAB | Age: 78
Setting detail: THERAPIES SERIES
Discharge: HOME | End: 2023-03-30
Payer: MEDICARE

## 2023-03-30 PROCEDURE — 94626 PHY/QHP OP PULM RHB W/MNTR: CPT

## 2023-04-03 ENCOUNTER — HOSPITAL ENCOUNTER (OUTPATIENT)
Dept: CARDIAC REHAB | Age: 78
Setting detail: THERAPIES SERIES
Discharge: HOME | End: 2023-04-03
Payer: MEDICARE

## 2023-04-03 PROCEDURE — 94626 PHY/QHP OP PULM RHB W/MNTR: CPT

## 2023-04-06 ENCOUNTER — HOSPITAL ENCOUNTER (OUTPATIENT)
Dept: CARDIAC REHAB | Age: 78
Setting detail: THERAPIES SERIES
Discharge: HOME | End: 2023-04-06
Payer: MEDICARE

## 2023-04-06 PROCEDURE — 94626 PHY/QHP OP PULM RHB W/MNTR: CPT

## 2023-04-12 PROBLEM — I74.3 EMBOLISM AND THROMBOSIS OF ARTERIES OF THE LOWER EXTREMITIES (HCC): Status: ACTIVE | Noted: 2023-04-12

## 2023-04-24 ENCOUNTER — TELEPHONE (OUTPATIENT)
Dept: INTERNAL MEDICINE CLINIC | Age: 78
End: 2023-04-24

## 2023-04-24 DIAGNOSIS — D50.9 IRON DEFICIENCY ANEMIA, UNSPECIFIED IRON DEFICIENCY ANEMIA TYPE: Primary | ICD-10-CM

## 2023-04-24 RX ORDER — FERROUS SULFATE 325(65) MG
325 TABLET ORAL
Qty: 30 TABLET | Refills: 0 | Status: SHIPPED | OUTPATIENT
Start: 2023-04-24

## 2023-04-24 NOTE — TELEPHONE ENCOUNTER
Lydia calls regarding labs to be checked for hemoglobin/anemia? Dr. Aliya Mckeon recently did EGD and was instructed that patient would need colonoscopy but Dr. Aliya Mckeon wanted PCP to monitor anemia before colonoscopy could be scheduled? Do labs now? Please advise.

## 2023-04-27 DIAGNOSIS — D64.9 ANEMIA, UNSPECIFIED TYPE: ICD-10-CM

## 2023-04-27 DIAGNOSIS — D64.9 ANEMIA, UNSPECIFIED TYPE: Primary | ICD-10-CM

## 2023-04-27 LAB
BASOPHILS # BLD: 0.1 K/UL (ref 0–0.2)
BASOPHILS NFR BLD: 0.9 %
DEPRECATED RDW RBC AUTO: 16.8 % (ref 12.4–15.4)
EOSINOPHIL # BLD: 0.1 K/UL (ref 0–0.6)
EOSINOPHIL NFR BLD: 1.7 %
FOLATE SERPL-MCNC: 18.76 NG/ML (ref 4.78–24.2)
HCT VFR BLD AUTO: 31.6 % (ref 40.5–52.5)
HGB BLD-MCNC: 9.9 G/DL (ref 13.5–17.5)
LYMPHOCYTES # BLD: 0.9 K/UL (ref 1–5.1)
LYMPHOCYTES NFR BLD: 14.1 %
MCH RBC QN AUTO: 27.3 PG (ref 26–34)
MCHC RBC AUTO-ENTMCNC: 31.4 G/DL (ref 31–36)
MCV RBC AUTO: 86.9 FL (ref 80–100)
MONOCYTES # BLD: 0.6 K/UL (ref 0–1.3)
MONOCYTES NFR BLD: 9.6 %
NEUTROPHILS # BLD: 4.9 K/UL (ref 1.7–7.7)
NEUTROPHILS NFR BLD: 73.7 %
PLATELET # BLD AUTO: 286 K/UL (ref 135–450)
PMV BLD AUTO: 9 FL (ref 5–10.5)
RBC # BLD AUTO: 3.64 M/UL (ref 4.2–5.9)
VIT B12 SERPL-MCNC: 278 PG/ML (ref 211–911)
WBC # BLD AUTO: 6.6 K/UL (ref 4–11)

## 2023-04-27 PROCEDURE — 36415 COLL VENOUS BLD VENIPUNCTURE: CPT | Performed by: INTERNAL MEDICINE

## 2023-05-01 ENCOUNTER — CLINICAL DOCUMENTATION (OUTPATIENT)
Dept: ONCOLOGY | Age: 78
End: 2023-05-01

## 2023-05-01 NOTE — PROGRESS NOTES
This nurse received a call from the patient's daughter concerning the patient's recent Hgb and concerns about his anemia. The patient had a CT completed at [facility] and the daughter reports that Dr Alberto Valero had suggested a PET scan. The daughter would like to know if Dr Kassandra Heni like the patient to have a PET scan and if he would like to see him sooner than his 6/2 appointment. This nurse will speak with the provider about the family's concerns and call the daughter back @ [phone].

## 2023-05-01 NOTE — PROGRESS NOTES
This nurse called the patient's sister Lydia lee @ [phone] to notify her that Dr Kristian Cosby would like to see the patient sooner than his scheduled 6/2 appointment. This nurse informed her that the patient was rescheduled to 5/10/23 @ [phone] and she verbalized understanding. This nurse explained that Dr Kristian Cosby will see the patient and then determine if additional scans or testing is needed. The patient's sister verbalized understanding and denies further needs at this time.

## 2023-05-08 NOTE — PROGRESS NOTES
Patient Name: Lon Ireland  Patient : 1945  Patient MRN: [id number]     Primary Oncologist: Daysi Ramos MD  Referring Provider: Ruthie Mauro MD     Date of Service: 5/10/2023      Chief Complaint:   Chief Complaint   Patient presents with    Follow-up    Results     Patient Active Problem List:     Malignant neoplasm of lower lobe of right lung      SCC (squamous cell carcinoma of lung), right     HPI:   Mr. Anshul Fink is a 43-year-old very pleasant gentleman with medical history significant for hypertension, COPD, hyperlipidemia, peripheral arterial disease, coronary artery disease, history of anal canal squamous cell carcinoma, status post chemoradiation therapy (completed in 2009), initially referred to me on 2018 for evaluation of clinical stage IA1 right lower lobe squamous cell lung cancer. He stated that he has screening low dose CT scan of the chest (ordered by Dr. Lyndsay Montes De Oca) on 2018 and it showed 10 mm ground glass left lower lobe nodule, which is new since . Subsequently he had PET/CT scan on 18 and it showed metabolically active 5 mm nodule in the right lower lobe, potentially malignant. There was no FDG activity associated with the ground glass left lower lobe nodule seen on prior CT scan. Repeat CT scan on 18 showed increasing irregular right lower lobe nodule 7.9 x 7.2 mm, considered neoplastic until proven otherwise. Resolution of left lower lobe ground glass density. He subsequently underwent CT guided biopsy of right lower lobe lung mass on 18 and final pathology was consistent with squamous cell carcinoma. He was subsequently referred to me for evaluation. PET/CT scan done on 2018 showed slightly increased size and increased uptake of the biopsy-proven malignancy in the right lower lobe. No findings of metastatic disease.     MRI of the brain with and without contrast done on 1/3/19 showed No acute

## 2023-05-10 ENCOUNTER — OFFICE VISIT (OUTPATIENT)
Dept: ONCOLOGY | Age: 78
End: 2023-05-10
Payer: MEDICARE

## 2023-05-10 ENCOUNTER — HOSPITAL ENCOUNTER (OUTPATIENT)
Dept: INFUSION THERAPY | Age: 78
Discharge: HOME | End: 2023-05-10
Payer: MEDICARE

## 2023-05-10 VITALS
DIASTOLIC BLOOD PRESSURE: 58 MMHG | OXYGEN SATURATION: 98 % | SYSTOLIC BLOOD PRESSURE: 92 MMHG | BODY MASS INDEX: 16.71 KG/M2 | WEIGHT: 104 LBS | HEART RATE: 75 BPM | HEIGHT: 66 IN | TEMPERATURE: 97.8 F

## 2023-05-10 DIAGNOSIS — D50.9 IRON DEFICIENCY ANEMIA, UNSPECIFIED IRON DEFICIENCY ANEMIA TYPE: ICD-10-CM

## 2023-05-10 DIAGNOSIS — C34.31 MALIGNANT NEOPLASM OF LOWER LOBE OF RIGHT LUNG (HCC): Primary | ICD-10-CM

## 2023-05-10 PROCEDURE — 3078F DIAST BP <80 MM HG: CPT | Performed by: INTERNAL MEDICINE

## 2023-05-10 PROCEDURE — 3074F SYST BP LT 130 MM HG: CPT | Performed by: INTERNAL MEDICINE

## 2023-05-10 PROCEDURE — G8419 CALC BMI OUT NRM PARAM NOF/U: HCPCS | Performed by: INTERNAL MEDICINE

## 2023-05-10 PROCEDURE — 99211 OFF/OP EST MAY X REQ PHY/QHP: CPT

## 2023-05-10 PROCEDURE — 4004F PT TOBACCO SCREEN RCVD TLK: CPT | Performed by: INTERNAL MEDICINE

## 2023-05-10 PROCEDURE — 99213 OFFICE O/P EST LOW 20 MIN: CPT | Performed by: INTERNAL MEDICINE

## 2023-05-10 PROCEDURE — 1123F ACP DISCUSS/DSCN MKR DOCD: CPT | Performed by: INTERNAL MEDICINE

## 2023-05-10 PROCEDURE — G8427 DOCREV CUR MEDS BY ELIG CLIN: HCPCS | Performed by: INTERNAL MEDICINE

## 2023-05-10 RX ORDER — AZITHROMYCIN 500 MG/1
TABLET, FILM COATED ORAL
COMMUNITY
Start: 2023-04-18

## 2023-05-10 RX ORDER — PANTOPRAZOLE SODIUM 40 MG/1
40 TABLET, DELAYED RELEASE ORAL DAILY
COMMUNITY
Start: 2023-04-19

## 2023-05-10 RX ORDER — FERROUS SULFATE 325(65) MG
TABLET ORAL
Qty: 30 TABLET | Refills: 0 | Status: SHIPPED | OUTPATIENT
Start: 2023-05-10

## 2023-05-10 ASSESSMENT — PATIENT HEALTH QUESTIONNAIRE - PHQ9
SUM OF ALL RESPONSES TO PHQ QUESTIONS 1-9: 0
SUM OF ALL RESPONSES TO PHQ9 QUESTIONS 1 & 2: 0
2. FEELING DOWN, DEPRESSED OR HOPELESS: 0
1. LITTLE INTEREST OR PLEASURE IN DOING THINGS: 0
SUM OF ALL RESPONSES TO PHQ QUESTIONS 1-9: 0

## 2023-05-10 NOTE — PROGRESS NOTES
MA Rooming Questions  Patient: Sai Mckinnon  MRN: [id number]    Date: 5/10/2023        1. Do you have any new issues? yes - LOW HEMOGLOBIN         2. Do you need any refills on medications?    no    3. Have you had any imaging done since your last visit? yes - LABS 4/27 COLONOSCOPY 5/9 AND EGD 4/19 CT CHEST 4/19    4. Have you been hospitalized or seen in the emergency room since your last visit here?   no    5. Did the patient have a depression screening completed today?  Yes    PHQ-9 Total Score: 0 (5/10/2023 11:28 AM)       PHQ-9 Given to (if applicable):               PHQ-9 Score (if applicable):                     [] Positive     []  Negative              Does question #9 need addressed (if applicable)                     [] Yes    []  No               Early Dole, CMA

## 2023-05-11 ENCOUNTER — TELEPHONE (OUTPATIENT)
Dept: ONCOLOGY | Age: 78
End: 2023-05-11

## 2023-05-11 NOTE — TELEPHONE ENCOUNTER
5/11/23 - pt's daughter, Wolf Both called back and confirmed she received the message for the Pet CT

## 2023-05-11 NOTE — TELEPHONE ENCOUNTER
5/11/23 - Left pt's daughter Chaitanya Strange, a vm for the 5/17/23 Pet scan arrival time of 1:00 pm and NPO 6 hours prior I left my direct line for a call back confirmation [phone]

## 2023-05-25 ENCOUNTER — HOSPITAL ENCOUNTER (OUTPATIENT)
Dept: PET IMAGING | Age: 78
Discharge: HOME | End: 2023-05-25
Payer: MEDICARE

## 2023-05-25 DIAGNOSIS — C34.31 MALIGNANT NEOPLASM OF LOWER LOBE OF RIGHT LUNG (HCC): ICD-10-CM

## 2023-05-25 PROCEDURE — A9552 F18 FDG: HCPCS | Performed by: INTERNAL MEDICINE

## 2023-05-25 PROCEDURE — 3430000000 HC RX DIAGNOSTIC RADIOPHARMACEUTICAL: Performed by: INTERNAL MEDICINE

## 2023-05-25 PROCEDURE — 2580000003 HC RX 258: Performed by: INTERNAL MEDICINE

## 2023-05-25 PROCEDURE — 78815 PET IMAGE W/CT SKULL-THIGH: CPT

## 2023-05-25 RX ORDER — FLUDEOXYGLUCOSE F 18 200 MCI/ML
15.34 INJECTION, SOLUTION INTRAVENOUS
Status: COMPLETED | OUTPATIENT
Start: 2023-05-25 | End: 2023-05-25

## 2023-05-25 RX ORDER — SODIUM CHLORIDE 0.9 % (FLUSH) 0.9 %
10 SYRINGE (ML) INJECTION PRN
Status: COMPLETED | OUTPATIENT
Start: 2023-05-25 | End: 2023-05-25

## 2023-05-25 RX ADMIN — SODIUM CHLORIDE, PRESERVATIVE FREE 10 ML: 5 INJECTION INTRAVENOUS at 07:52

## 2023-05-25 RX ADMIN — FLUDEOXYGLUCOSE F 18 15.34 MILLICURIE: 200 INJECTION, SOLUTION INTRAVENOUS at 07:52

## 2023-05-31 ENCOUNTER — HOSPITAL ENCOUNTER (OUTPATIENT)
Dept: INFUSION THERAPY | Age: 78
Discharge: HOME | End: 2023-05-31
Payer: MEDICARE

## 2023-05-31 ENCOUNTER — OFFICE VISIT (OUTPATIENT)
Dept: ONCOLOGY | Age: 78
End: 2023-05-31
Payer: MEDICARE

## 2023-05-31 VITALS
SYSTOLIC BLOOD PRESSURE: 144 MMHG | DIASTOLIC BLOOD PRESSURE: 60 MMHG | HEART RATE: 97 BPM | BODY MASS INDEX: 16.97 KG/M2 | OXYGEN SATURATION: 92 % | RESPIRATION RATE: 18 BRPM | HEIGHT: 66 IN | TEMPERATURE: 98.1 F | WEIGHT: 105.6 LBS

## 2023-05-31 DIAGNOSIS — D50.9 IRON DEFICIENCY ANEMIA, UNSPECIFIED IRON DEFICIENCY ANEMIA TYPE: ICD-10-CM

## 2023-05-31 DIAGNOSIS — C34.31 MALIGNANT NEOPLASM OF LOWER LOBE OF RIGHT LUNG (HCC): Primary | ICD-10-CM

## 2023-05-31 PROCEDURE — G8419 CALC BMI OUT NRM PARAM NOF/U: HCPCS | Performed by: NURSE PRACTITIONER

## 2023-05-31 PROCEDURE — 99211 OFF/OP EST MAY X REQ PHY/QHP: CPT

## 2023-05-31 PROCEDURE — 99214 OFFICE O/P EST MOD 30 MIN: CPT | Performed by: NURSE PRACTITIONER

## 2023-05-31 PROCEDURE — 3078F DIAST BP <80 MM HG: CPT | Performed by: NURSE PRACTITIONER

## 2023-05-31 PROCEDURE — 3077F SYST BP >= 140 MM HG: CPT | Performed by: NURSE PRACTITIONER

## 2023-05-31 PROCEDURE — 1123F ACP DISCUSS/DSCN MKR DOCD: CPT | Performed by: NURSE PRACTITIONER

## 2023-05-31 PROCEDURE — G8427 DOCREV CUR MEDS BY ELIG CLIN: HCPCS | Performed by: NURSE PRACTITIONER

## 2023-05-31 PROCEDURE — 4004F PT TOBACCO SCREEN RCVD TLK: CPT | Performed by: NURSE PRACTITIONER

## 2023-05-31 RX ORDER — FERROUS SULFATE 325(65) MG
1 TABLET ORAL
Qty: 30 TABLET | Refills: 0 | Status: SHIPPED | OUTPATIENT
Start: 2023-05-31

## 2023-05-31 NOTE — PROGRESS NOTES
MA Rooming Questions  Patient: Bernardo Manning  MRN: [id number]    Date: 5/31/2023        1. Do you have any new issues?   no         2. Do you need any refills on medications?    no    3. Have you had any imaging done since your last visit? yes - PET scan    4. Have you been hospitalized or seen in the emergency room since your last visit here?   no    5. Did the patient have a depression screening completed today?  No    No data recorded     PHQ-9 Given to (if applicable):               PHQ-9 Score (if applicable):                     [] Positive     []  Negative              Does question #9 need addressed (if applicable)                     [] Yes    []  No               Ld Rosado CMA

## 2023-05-31 NOTE — PROGRESS NOTES
Patient Name: Yesenia Linares  Patient : 1945  Patient MRN: [id number]     Primary Oncologist: Virginia Alcala MD  Referring Provider: Jaret Fink MD     Date of Service: 2023      Chief Complaint:   Chief Complaint   Patient presents with    Follow-up     Patient Active Problem List:     Malignant neoplasm of lower lobe of right lung      SCC (squamous cell carcinoma of lung), right     HPI:   Mr. Landry Bradley is a 51-year-old very pleasant gentleman with medical history significant for hypertension, COPD, hyperlipidemia, peripheral arterial disease, coronary artery disease, history of anal canal squamous cell carcinoma, status post chemoradiation therapy (completed in 2009), initially referred to me on 2018 for evaluation of clinical stage IA1 right lower lobe squamous cell lung cancer. He stated that he has screening low dose CT scan of the chest (ordered by Dr. Elizabeth Faria) on 2018 and it showed 10 mm ground glass left lower lobe nodule, which is new since . Subsequently he had PET/CT scan on 18 and it showed metabolically active 5 mm nodule in the right lower lobe, potentially malignant. There was no FDG activity associated with the ground glass left lower lobe nodule seen on prior CT scan. Repeat CT scan on 18 showed increasing irregular right lower lobe nodule 7.9 x 7.2 mm, considered neoplastic until proven otherwise. Resolution of left lower lobe ground glass density. He subsequently underwent CT guided biopsy of right lower lobe lung mass on 18 and final pathology was consistent with squamous cell carcinoma. He was subsequently referred to me for evaluation. PET/CT scan done on 2018 showed slightly increased size and increased uptake of the biopsy-proven malignancy in the right lower lobe. No findings of metastatic disease.     MRI of the brain with and without contrast done on 1/3/19 showed No acute intracranial

## 2023-06-05 ENCOUNTER — OFFICE VISIT (OUTPATIENT)
Dept: INTERNAL MEDICINE CLINIC | Age: 78
End: 2023-06-05
Payer: MEDICARE

## 2023-06-05 VITALS
WEIGHT: 103 LBS | SYSTOLIC BLOOD PRESSURE: 116 MMHG | HEART RATE: 74 BPM | OXYGEN SATURATION: 97 % | RESPIRATION RATE: 18 BRPM | BODY MASS INDEX: 16.62 KG/M2 | DIASTOLIC BLOOD PRESSURE: 58 MMHG

## 2023-06-05 DIAGNOSIS — D50.9 IRON DEFICIENCY ANEMIA, UNSPECIFIED IRON DEFICIENCY ANEMIA TYPE: ICD-10-CM

## 2023-06-05 DIAGNOSIS — D50.9 IRON DEFICIENCY ANEMIA, UNSPECIFIED IRON DEFICIENCY ANEMIA TYPE: Primary | ICD-10-CM

## 2023-06-05 LAB
DEPRECATED RDW RBC AUTO: 18.6 % (ref 12.4–15.4)
HCT VFR BLD AUTO: 35.7 % (ref 40.5–52.5)
HGB BLD-MCNC: 11.4 G/DL (ref 13.5–17.5)
MCH RBC QN AUTO: 27.4 PG (ref 26–34)
MCHC RBC AUTO-ENTMCNC: 31.9 G/DL (ref 31–36)
MCV RBC AUTO: 86.1 FL (ref 80–100)
PLATELET # BLD AUTO: 220 K/UL (ref 135–450)
PMV BLD AUTO: 9.3 FL (ref 5–10.5)
RBC # BLD AUTO: 4.15 M/UL (ref 4.2–5.9)
WBC # BLD AUTO: 5 K/UL (ref 4–11)

## 2023-06-05 PROCEDURE — 36415 COLL VENOUS BLD VENIPUNCTURE: CPT | Performed by: INTERNAL MEDICINE

## 2023-06-05 PROCEDURE — 1123F ACP DISCUSS/DSCN MKR DOCD: CPT | Performed by: INTERNAL MEDICINE

## 2023-06-05 PROCEDURE — 4004F PT TOBACCO SCREEN RCVD TLK: CPT | Performed by: INTERNAL MEDICINE

## 2023-06-05 PROCEDURE — 99213 OFFICE O/P EST LOW 20 MIN: CPT | Performed by: INTERNAL MEDICINE

## 2023-06-05 PROCEDURE — 3074F SYST BP LT 130 MM HG: CPT | Performed by: INTERNAL MEDICINE

## 2023-06-05 PROCEDURE — G8419 CALC BMI OUT NRM PARAM NOF/U: HCPCS | Performed by: INTERNAL MEDICINE

## 2023-06-05 PROCEDURE — 3078F DIAST BP <80 MM HG: CPT | Performed by: INTERNAL MEDICINE

## 2023-06-05 PROCEDURE — G8427 DOCREV CUR MEDS BY ELIG CLIN: HCPCS | Performed by: INTERNAL MEDICINE

## 2023-06-05 NOTE — PROGRESS NOTES
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 Scans for PET and also GI scopes all neg for bleed. Is on iron now and feels well, SINCE NOW ON ELIQUIS WE ARE DISCUSSING STOPPING THE IRON TOO. OBJECTIVE:    BP (!) 116/58   Pulse 74   Resp 18   Wt 103 lb (46.7 kg)   SpO2 97%   BMI 16.62 kg/m²     Physical Exam  Constitutional:       Appearance: Normal appearance. Cardiovascular:      Rate and Rhythm: Normal rate and regular rhythm. Heart sounds: No murmur heard. No gallop. Pulmonary:      Effort: No respiratory distress. Breath sounds: No wheezing. Abdominal:      General: Abdomen is flat. Bowel sounds are normal. There is no distension. Palpations: Abdomen is soft. There is no mass. Tenderness: There is no abdominal tenderness. Hernia: No hernia is present. Musculoskeletal:      Right lower leg: No edema. Left lower leg: No edema. Neurological:      Mental Status: He is alert. Psychiatric:         Mood and Affect: Mood normal.       ASSESSMENT:    1. Iron deficiency anemia, unspecified iron deficiency anemia type        PLAN:    Tari Childs was seen today for anemia. Diagnoses and all orders for this visit:    Iron deficiency anemia, unspecified iron deficiency anemia type- WE'LL RECHECK IRON COUNTS AND CBC TODAY, THEN PLANNING TO RECHECK MONTHLY FOR TWO MORE VISITS STAYING OFF THE ELIQUIS. THEN F/U W ME FOR OCT. -     Iron and TIBC; Future  -     Ferritin; Future  -     CBC; Future  -     CBC;  Standing

## 2023-06-06 DIAGNOSIS — D50.9 IRON DEFICIENCY ANEMIA, UNSPECIFIED IRON DEFICIENCY ANEMIA TYPE: ICD-10-CM

## 2023-06-06 LAB
FERRITIN SERPL IA-MCNC: 27.2 NG/ML (ref 30–400)
IRON SATN MFR SERPL: 8 % (ref 20–50)
IRON SERPL-MCNC: 22 UG/DL (ref 59–158)
TIBC SERPL-MCNC: 289 UG/DL (ref 260–445)

## 2023-06-06 RX ORDER — FERROUS SULFATE 325(65) MG
1 TABLET ORAL
Qty: 90 TABLET | Refills: 1 | Status: SHIPPED | OUTPATIENT
Start: 2023-06-06

## 2023-06-06 NOTE — RESULT ENCOUNTER NOTE
Please call pt, PRIOR ANEMIA HAS IMPROVED BUT IRON STORES REMAIN LOW SO SHOULD CONTINUE THE DAILY IRON SUPPLEMENT. I SENT IN RF, WE SHOULD RECHECK IRON, FERRITIN AND TIBC ALSO W HIS NEXT LAB IN A MONTH (HAD STANDING ORDER TO CHECK CBC MONTHLY X2 GIVEN YESTERDAY.

## 2023-06-09 ENCOUNTER — HOSPITAL ENCOUNTER (OUTPATIENT)
Dept: GENERAL RADIOLOGY | Age: 78
Discharge: HOME | End: 2023-06-09
Payer: MEDICARE

## 2023-06-09 DIAGNOSIS — D64.9 ANEMIA, UNSPECIFIED TYPE: ICD-10-CM

## 2023-06-09 PROCEDURE — 74250 X-RAY XM SM INT 1CNTRST STD: CPT

## 2023-07-06 DIAGNOSIS — D50.9 IRON DEFICIENCY ANEMIA, UNSPECIFIED IRON DEFICIENCY ANEMIA TYPE: ICD-10-CM

## 2023-07-06 LAB
DEPRECATED RDW RBC AUTO: 20.3 % (ref 12.4–15.4)
HCT VFR BLD AUTO: 38.1 % (ref 40.5–52.5)
HGB BLD-MCNC: 12.2 G/DL (ref 13.5–17.5)
MCH RBC QN AUTO: 27.7 PG (ref 26–34)
MCHC RBC AUTO-ENTMCNC: 32 G/DL (ref 31–36)
MCV RBC AUTO: 86.4 FL (ref 80–100)
PLATELET # BLD AUTO: 205 K/UL (ref 135–450)
PMV BLD AUTO: 9.7 FL (ref 5–10.5)
RBC # BLD AUTO: 4.41 M/UL (ref 4.2–5.9)
WBC # BLD AUTO: 5.3 K/UL (ref 4–11)

## 2023-07-06 PROCEDURE — 36415 COLL VENOUS BLD VENIPUNCTURE: CPT | Performed by: INTERNAL MEDICINE

## 2023-07-30 DIAGNOSIS — I70.0 ATHEROSCLEROSIS OF AORTIC ARCH (HCC): ICD-10-CM

## 2023-07-31 RX ORDER — METOPROLOL SUCCINATE 25 MG/1
TABLET, EXTENDED RELEASE ORAL
Qty: 90 TABLET | Refills: 1 | OUTPATIENT
Start: 2023-07-31

## 2023-08-03 ENCOUNTER — TELEPHONE (OUTPATIENT)
Dept: ONCOLOGY | Age: 78
End: 2023-08-03

## 2023-08-03 NOTE — TELEPHONE ENCOUNTER
8/3/23 -left pt's daughter, Paul Rose, a vm for the 9/1/23 Ct scan at [facility] arrival time of 10:00 am and NPO 4 hours prior. I rescheduled the [facility] appt to 9/7/23 at 11:30 am. I asked the pt's daughter to call back and confirm the appts.

## 2023-08-08 DIAGNOSIS — D50.9 IRON DEFICIENCY ANEMIA, UNSPECIFIED IRON DEFICIENCY ANEMIA TYPE: ICD-10-CM

## 2023-08-08 LAB
DEPRECATED RDW RBC AUTO: 18.7 % (ref 12.4–15.4)
HCT VFR BLD AUTO: 40.1 % (ref 40.5–52.5)
HGB BLD-MCNC: 12.7 G/DL (ref 13.5–17.5)
MCH RBC QN AUTO: 26.5 PG (ref 26–34)
MCHC RBC AUTO-ENTMCNC: 31.5 G/DL (ref 31–36)
MCV RBC AUTO: 84.1 FL (ref 80–100)
PLATELET # BLD AUTO: 319 K/UL (ref 135–450)
PMV BLD AUTO: 8.6 FL (ref 5–10.5)
RBC # BLD AUTO: 4.78 M/UL (ref 4.2–5.9)
WBC # BLD AUTO: 5.6 K/UL (ref 4–11)

## 2023-08-08 PROCEDURE — 36415 COLL VENOUS BLD VENIPUNCTURE: CPT | Performed by: INTERNAL MEDICINE

## 2023-08-09 NOTE — RESULT ENCOUNTER NOTE
Please call pt, BLOOD COUNTS ARE STABLE W ANEMIA CONT TO RESOLVE. HGB RISING FROM 9.9-11.4 TO 12.2 NOW TO 12. 7.

## 2023-08-23 DIAGNOSIS — I70.0 ATHEROSCLEROSIS OF AORTIC ARCH (HCC): ICD-10-CM

## 2023-08-23 DIAGNOSIS — E03.4 HYPOTHYROIDISM DUE TO ACQUIRED ATROPHY OF THYROID: ICD-10-CM

## 2023-08-23 DIAGNOSIS — E78.2 HYPERLIPIDEMIA, MIXED: ICD-10-CM

## 2023-08-23 RX ORDER — LEVOTHYROXINE SODIUM 0.07 MG/1
TABLET ORAL
Qty: 90 TABLET | Refills: 1 | OUTPATIENT
Start: 2023-08-23

## 2023-08-23 RX ORDER — ASPIRIN 81 MG
TABLET,CHEWABLE ORAL
Qty: 90 TABLET | Refills: 1 | OUTPATIENT
Start: 2023-08-23

## 2023-08-23 RX ORDER — ATORVASTATIN CALCIUM 10 MG/1
TABLET, FILM COATED ORAL
Qty: 90 TABLET | Refills: 1 | OUTPATIENT
Start: 2023-08-23

## 2023-09-01 ENCOUNTER — HOSPITAL ENCOUNTER (OUTPATIENT)
Dept: CT IMAGING | Age: 78
Discharge: HOME | End: 2023-09-01
Payer: MEDICARE

## 2023-09-01 DIAGNOSIS — C34.31 MALIGNANT NEOPLASM OF LOWER LOBE OF RIGHT LUNG (HCC): ICD-10-CM

## 2023-09-01 LAB
EGFR, POC: >60 ML/MIN/1.73M2
EGFR, POC: NORMAL ML/MIN/1.73M2
EGFR, POC: NORMAL ML/MIN/1.73M2
POC CREATININE: 0.4 MG/DL (ref 0.9–1.3)
POC CREATININE: NORMAL MG/DL (ref 0.9–1.3)
POC CREATININE: NORMAL MG/DL (ref 0.9–1.3)

## 2023-09-01 PROCEDURE — 71260 CT THORAX DX C+: CPT

## 2023-09-01 PROCEDURE — 2580000003 HC RX 258: Performed by: NURSE PRACTITIONER

## 2023-09-01 PROCEDURE — 82565 ASSAY OF CREATININE: CPT

## 2023-09-01 PROCEDURE — 6360000004 HC RX CONTRAST MEDICATION: Performed by: NURSE PRACTITIONER

## 2023-09-01 RX ORDER — SODIUM CHLORIDE 9 MG/ML
10 INJECTION INTRAVENOUS PRN
Status: COMPLETED | OUTPATIENT
Start: 2023-09-01 | End: 2023-09-01

## 2023-09-01 RX ADMIN — SODIUM CHLORIDE, PRESERVATIVE FREE 10 ML: 5 INJECTION INTRAVENOUS at 10:45

## 2023-09-01 RX ADMIN — IOPAMIDOL 75 ML: 755 INJECTION, SOLUTION INTRAVENOUS at 10:45

## 2023-09-05 ENCOUNTER — OFFICE VISIT (OUTPATIENT)
Dept: INTERNAL MEDICINE CLINIC | Age: 78
End: 2023-09-05
Payer: MEDICARE

## 2023-09-05 VITALS
WEIGHT: 100 LBS | RESPIRATION RATE: 16 BRPM | HEART RATE: 76 BPM | OXYGEN SATURATION: 86 % | DIASTOLIC BLOOD PRESSURE: 64 MMHG | BODY MASS INDEX: 16.14 KG/M2 | SYSTOLIC BLOOD PRESSURE: 120 MMHG

## 2023-09-05 DIAGNOSIS — C34.91 PRIMARY LUNG SQUAMOUS CELL CARCINOMA, RIGHT (HCC): ICD-10-CM

## 2023-09-05 DIAGNOSIS — I10 ESSENTIAL HYPERTENSION: ICD-10-CM

## 2023-09-05 DIAGNOSIS — D50.9 IRON DEFICIENCY ANEMIA, UNSPECIFIED IRON DEFICIENCY ANEMIA TYPE: ICD-10-CM

## 2023-09-05 DIAGNOSIS — E78.2 HYPERLIPIDEMIA, MIXED: ICD-10-CM

## 2023-09-05 DIAGNOSIS — J42 CHRONIC BRONCHITIS, UNSPECIFIED CHRONIC BRONCHITIS TYPE (HCC): ICD-10-CM

## 2023-09-05 DIAGNOSIS — E03.4 HYPOTHYROIDISM DUE TO ACQUIRED ATROPHY OF THYROID: ICD-10-CM

## 2023-09-05 DIAGNOSIS — K21.9 GASTROESOPHAGEAL REFLUX DISEASE WITHOUT ESOPHAGITIS: ICD-10-CM

## 2023-09-05 DIAGNOSIS — R41.3 MEMORY DEFICIT: Primary | ICD-10-CM

## 2023-09-05 LAB
ALBUMIN SERPL-MCNC: 4 G/DL (ref 3.4–5)
ALBUMIN/GLOB SERPL: 1.9 {RATIO} (ref 1.1–2.2)
ALP SERPL-CCNC: 69 U/L (ref 40–129)
ALT SERPL-CCNC: 9 U/L (ref 10–40)
ANION GAP SERPL CALCULATED.3IONS-SCNC: 13 MMOL/L (ref 3–16)
AST SERPL-CCNC: 12 U/L (ref 15–37)
BILIRUB SERPL-MCNC: 0.3 MG/DL (ref 0–1)
BUN SERPL-MCNC: 11 MG/DL (ref 7–20)
CALCIUM SERPL-MCNC: 9.3 MG/DL (ref 8.3–10.6)
CHLORIDE SERPL-SCNC: 101 MMOL/L (ref 99–110)
CHOLEST SERPL-MCNC: 124 MG/DL (ref 0–199)
CO2 SERPL-SCNC: 29 MMOL/L (ref 21–32)
CREAT SERPL-MCNC: 0.6 MG/DL (ref 0.8–1.3)
DEPRECATED RDW RBC AUTO: 18.8 % (ref 12.4–15.4)
FERRITIN SERPL IA-MCNC: 44.7 NG/ML (ref 30–400)
GFR SERPLBLD CREATININE-BSD FMLA CKD-EPI: >60 ML/MIN/{1.73_M2}
GLUCOSE SERPL-MCNC: 115 MG/DL (ref 70–99)
HCT VFR BLD AUTO: 39.2 % (ref 40.5–52.5)
HDLC SERPL-MCNC: 64 MG/DL (ref 40–60)
HGB BLD-MCNC: 12.9 G/DL (ref 13.5–17.5)
IRON SATN MFR SERPL: 18 % (ref 20–50)
IRON SERPL-MCNC: 52 UG/DL (ref 59–158)
LDLC SERPL CALC-MCNC: 25 MG/DL
MCH RBC QN AUTO: 29.1 PG (ref 26–34)
MCHC RBC AUTO-ENTMCNC: 32.9 G/DL (ref 31–36)
MCV RBC AUTO: 88.4 FL (ref 80–100)
PLATELET # BLD AUTO: 184 K/UL (ref 135–450)
PMV BLD AUTO: 8.1 FL (ref 5–10.5)
POTASSIUM SERPL-SCNC: 4.6 MMOL/L (ref 3.5–5.1)
PROT SERPL-MCNC: 6.1 G/DL (ref 6.4–8.2)
RBC # BLD AUTO: 4.43 M/UL (ref 4.2–5.9)
SODIUM SERPL-SCNC: 143 MMOL/L (ref 136–145)
T4 FREE SERPL-MCNC: 2 NG/DL (ref 0.9–1.8)
TIBC SERPL-MCNC: 290 UG/DL (ref 260–445)
TRIGL SERPL-MCNC: 177 MG/DL (ref 0–150)
TSH SERPL DL<=0.005 MIU/L-ACNC: 0.86 UIU/ML (ref 0.27–4.2)
VLDLC SERPL CALC-MCNC: 35 MG/DL
WBC # BLD AUTO: 5.6 K/UL (ref 4–11)

## 2023-09-05 PROCEDURE — 1123F ACP DISCUSS/DSCN MKR DOCD: CPT | Performed by: INTERNAL MEDICINE

## 2023-09-05 PROCEDURE — 99214 OFFICE O/P EST MOD 30 MIN: CPT | Performed by: INTERNAL MEDICINE

## 2023-09-05 PROCEDURE — 36415 COLL VENOUS BLD VENIPUNCTURE: CPT | Performed by: INTERNAL MEDICINE

## 2023-09-05 PROCEDURE — 3078F DIAST BP <80 MM HG: CPT | Performed by: INTERNAL MEDICINE

## 2023-09-05 PROCEDURE — 3074F SYST BP LT 130 MM HG: CPT | Performed by: INTERNAL MEDICINE

## 2023-09-05 RX ORDER — ALBUTEROL SULFATE 90 UG/1
2 AEROSOL, METERED RESPIRATORY (INHALATION) EVERY 4 HOURS PRN
Qty: 18 G | Refills: 1 | Status: SHIPPED | OUTPATIENT
Start: 2023-09-05

## 2023-09-05 RX ORDER — DONEPEZIL HYDROCHLORIDE 5 MG/1
5 TABLET, FILM COATED ORAL NIGHTLY
Qty: 30 TABLET | Refills: 3 | Status: SHIPPED | OUTPATIENT
Start: 2023-09-05

## 2023-09-05 RX ORDER — PANTOPRAZOLE SODIUM 40 MG/1
40 TABLET, DELAYED RELEASE ORAL DAILY
Qty: 90 TABLET | Refills: 1 | Status: SHIPPED | OUTPATIENT
Start: 2023-09-05

## 2023-09-05 RX ORDER — ALBUTEROL SULFATE 90 UG/1
2 AEROSOL, METERED RESPIRATORY (INHALATION) EVERY 4 HOURS PRN
Qty: 18 G | Refills: 1 | Status: CANCELLED | OUTPATIENT
Start: 2023-09-05 | End: 2024-03-03

## 2023-09-05 RX ORDER — FERROUS SULFATE 325(65) MG
1 TABLET ORAL
Qty: 90 TABLET | Refills: 1 | Status: SHIPPED | OUTPATIENT
Start: 2023-09-05

## 2023-09-06 ENCOUNTER — TELEPHONE (OUTPATIENT)
Dept: INTERNAL MEDICINE CLINIC | Age: 78
End: 2023-09-06

## 2023-09-06 NOTE — TELEPHONE ENCOUNTER
PTS DAUGHTER WANTED TO KNOW IF ANNMARIE COULD START ON MEGACE? SHE SAID A FAMILY MEMBER HAD SUGGESTED IT SINCE HE IS NOW ONLY 100 LB.  PLEASE ADVISE

## 2023-09-06 NOTE — RESULT ENCOUNTER NOTE
Call pt, labs ok/chol ok and thyroid fxn is stable.   Iron is still sl low but miproved so do rec cont supplement as prescribed 1x/day

## 2023-09-07 ENCOUNTER — OFFICE VISIT (OUTPATIENT)
Dept: ONCOLOGY | Age: 78
End: 2023-09-07
Payer: MEDICARE

## 2023-09-07 ENCOUNTER — HOSPITAL ENCOUNTER (OUTPATIENT)
Dept: INFUSION THERAPY | Age: 78
Discharge: HOME | End: 2023-09-07
Payer: MEDICARE

## 2023-09-07 ENCOUNTER — CLINICAL DOCUMENTATION (OUTPATIENT)
Dept: ONCOLOGY | Age: 78
End: 2023-09-07

## 2023-09-07 VITALS
TEMPERATURE: 98 F | BODY MASS INDEX: 15.81 KG/M2 | WEIGHT: 98.4 LBS | SYSTOLIC BLOOD PRESSURE: 110 MMHG | HEART RATE: 83 BPM | HEIGHT: 66 IN | OXYGEN SATURATION: 91 % | DIASTOLIC BLOOD PRESSURE: 58 MMHG

## 2023-09-07 DIAGNOSIS — C34.31 MALIGNANT NEOPLASM OF LOWER LOBE OF RIGHT LUNG (HCC): Primary | ICD-10-CM

## 2023-09-07 PROCEDURE — 1123F ACP DISCUSS/DSCN MKR DOCD: CPT | Performed by: INTERNAL MEDICINE

## 2023-09-07 PROCEDURE — 3078F DIAST BP <80 MM HG: CPT | Performed by: INTERNAL MEDICINE

## 2023-09-07 PROCEDURE — 3074F SYST BP LT 130 MM HG: CPT | Performed by: INTERNAL MEDICINE

## 2023-09-07 PROCEDURE — 99211 OFF/OP EST MAY X REQ PHY/QHP: CPT

## 2023-09-07 PROCEDURE — 99213 OFFICE O/P EST LOW 20 MIN: CPT | Performed by: INTERNAL MEDICINE

## 2023-09-07 RX ORDER — DRONABINOL 5 MG/1
5 CAPSULE ORAL
Qty: 60 CAPSULE | Refills: 0 | Status: SHIPPED | OUTPATIENT
Start: 2023-09-07 | End: 2023-10-07

## 2023-09-07 NOTE — PROGRESS NOTES
MA Rooming Questions  Patient: Kristen Driver  MRN: [id number]    Date: 9/7/2023        1. Do you have any new issues? yes - Appetite and anemia          2. Do you need any refills on medications?    no    3. Have you had any imaging done since your last visit? yes - CT Chest 09/01    4. Have you been hospitalized or seen in the emergency room since your last visit here?   no    5. Did the patient have a depression screening completed today?  No    No data recorded     PHQ-9 Given to (if applicable):               PHQ-9 Score (if applicable):                     [] Positive     []  Negative              Does question #9 need addressed (if applicable)                     [] Yes    []  No               Delphia Stage, MA
Nutrition Screening    There is no height or weight on file to calculate BMI. Any recent Weight Loss? yes   Wt Readings from Last 3 Encounters:   09/05/23 100 lb (45.4 kg)   06/05/23 103 lb (46.7 kg)   05/31/23 105 lb 9.6 oz (47.9 kg)       If yes, how many LBS -  7.2 lbs   How many weeks, Months - 4 months   But patient has lost 2lbs in 2 days     Are you eating 3 meals a day? no    Any difficulty swallowing or chewing food? no    Are you eating any Protein in you diet?  yes - sometimes patient also does drink protein drinks daily
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 Dionna Bauman is a 67year old very pleasant gentleman who was found to have 1 cm ground glass nodule in his left lower lobe on screening low dose CT scan, done on July 20, 2018. Subsequently, he had PET/CT scan and it showed metabolically active 5 mm nodule in right lower lobe, potentially malignant. No metabolic activity in left lower lobe ground glass nodule. Repeat CT scan of the chest on November 14, 2018 showed increased in size of right lower lobe lung mass, considered neoplastic until proven otherwise. Subsequently had CT guided biopsy of the right lower lobe lung mass on Decmeber 7, 2018 and final pathology was consistent with squamous cell carcinoma. PET/CT scan done on December 27, 2018 showed slightly increased size and increased uptake of the biopsy-proven malignancy in the right lower lobe. No findings of metastatic disease. MRI of the brain with and without contrast done on 1/3/19 showed No acute intracranial abnormality. No intracranial metastatic disease. 2. Age-related parenchymal volume loss and mild chronic microvascular ischemic changes. He was seen by cardio thoracic surgeon and they don't think he is a candidate for surgery. He was subsequently seen by Dr. Jerod Leon and he was treated with stereotactic body radiation therapy between February 19 and February 26, 2019. He has been in a close observation since then. He developed nonocclusive acute common femoral veing DVT, moderate non occlusive acute popliteal vein DVT on 5/22/22. He has been on eliquis since then. I believe his VTE is likely secondary to immobility related to dyspnea associated with COPD. CT chest done on 4/11/23 showed redemonstration of a focal consolidative opacity within the superior segment of the right lower lobe, similar to prior. Further evaluation with PET/CT is suggested.  Few ground glass opacities within

## 2023-09-12 ENCOUNTER — TELEPHONE (OUTPATIENT)
Dept: INTERNAL MEDICINE CLINIC | Age: 78
End: 2023-09-12

## 2023-09-12 DIAGNOSIS — C34.31 MALIGNANT NEOPLASM OF LOWER LOBE OF RIGHT LUNG (HCC): ICD-10-CM

## 2023-09-12 DIAGNOSIS — C34.91 PRIMARY LUNG SQUAMOUS CELL CARCINOMA, RIGHT (HCC): ICD-10-CM

## 2023-09-12 DIAGNOSIS — D50.9 IRON DEFICIENCY ANEMIA, UNSPECIFIED IRON DEFICIENCY ANEMIA TYPE: ICD-10-CM

## 2023-09-12 DIAGNOSIS — R41.3 MEMORY DEFICIT: Primary | ICD-10-CM

## 2023-09-12 RX ORDER — DRONABINOL 5 MG/1
5 CAPSULE ORAL
Qty: 60 CAPSULE | Refills: 0 | Status: SHIPPED | OUTPATIENT
Start: 2023-09-12 | End: 2023-10-12

## 2023-09-12 NOTE — TELEPHONE ENCOUNTER
Patient left message requesting a refill for MARINOL to be sent to  [facility] [address] LOCATION DID NOT HAVE THIS. Clemmie Numbers Pending RX to Provider to be sent to pharmacy.

## 2023-09-12 NOTE — TELEPHONE ENCOUNTER
Lydia weiss- she wanted to know if she could have a hospice evaluation for Misty Lewis? She states he is declining and doesn't take care of himself. He doesn't want to do anything for himself and she states he can't take care of himself. Wanted to know if hospice could go and do an eval to see what his options might be. Please advise.

## 2023-10-12 DIAGNOSIS — I70.0 ATHEROSCLEROSIS OF AORTIC ARCH (HCC): ICD-10-CM

## 2023-10-13 RX ORDER — METOPROLOL SUCCINATE 25 MG/1
25 TABLET, EXTENDED RELEASE ORAL EVERY MORNING
Qty: 90 TABLET | Refills: 1 | OUTPATIENT
Start: 2023-10-13

## 2023-11-13 DIAGNOSIS — C34.31 MALIGNANT NEOPLASM OF LOWER LOBE OF RIGHT LUNG (HCC): ICD-10-CM

## 2023-11-13 RX ORDER — DRONABINOL 5 MG/1
5 CAPSULE ORAL
Qty: 60 CAPSULE | Refills: 0 | Status: SHIPPED | OUTPATIENT
Start: 2023-11-13 | End: 2023-12-13

## 2023-11-13 NOTE — TELEPHONE ENCOUNTER
Patient left message requesting a refill for Marinol to be sent to [facility] on [address]. Pending RX to Provider to be sent to pharmacy.

## 2023-11-20 DIAGNOSIS — E03.4 HYPOTHYROIDISM DUE TO ACQUIRED ATROPHY OF THYROID: ICD-10-CM

## 2023-11-20 DIAGNOSIS — I70.0 ATHEROSCLEROSIS OF AORTIC ARCH (HCC): ICD-10-CM

## 2023-11-20 DIAGNOSIS — E78.2 HYPERLIPIDEMIA, MIXED: ICD-10-CM

## 2023-11-20 RX ORDER — LEVOTHYROXINE SODIUM 0.07 MG/1
75 TABLET ORAL DAILY
Qty: 90 TABLET | Refills: 1 | Status: SHIPPED | OUTPATIENT
Start: 2023-11-20

## 2023-11-20 RX ORDER — ASPIRIN 81 MG
81 TABLET,CHEWABLE ORAL DAILY
Qty: 90 TABLET | Refills: 1 | Status: SHIPPED | OUTPATIENT
Start: 2023-11-20

## 2023-11-20 RX ORDER — ATORVASTATIN CALCIUM 10 MG/1
10 TABLET, FILM COATED ORAL DAILY
Qty: 90 TABLET | Refills: 1 | Status: SHIPPED | OUTPATIENT
Start: 2023-11-20

## 2023-11-20 RX ORDER — METOPROLOL SUCCINATE 25 MG/1
25 TABLET, EXTENDED RELEASE ORAL EVERY MORNING
Qty: 90 TABLET | Refills: 1 | Status: SHIPPED | OUTPATIENT
Start: 2023-11-20

## 2023-12-04 ENCOUNTER — OFFICE VISIT (OUTPATIENT)
Dept: INTERNAL MEDICINE CLINIC | Age: 78
End: 2023-12-04
Payer: MEDICARE

## 2023-12-04 VITALS
BODY MASS INDEX: 15.59 KG/M2 | SYSTOLIC BLOOD PRESSURE: 138 MMHG | HEART RATE: 62 BPM | WEIGHT: 97 LBS | OXYGEN SATURATION: 97 % | HEIGHT: 66 IN | DIASTOLIC BLOOD PRESSURE: 72 MMHG

## 2023-12-04 DIAGNOSIS — J44.9 COPD, SEVERE (HCC): ICD-10-CM

## 2023-12-04 DIAGNOSIS — N40.0 BENIGN PROSTATIC HYPERPLASIA WITHOUT LOWER URINARY TRACT SYMPTOMS: ICD-10-CM

## 2023-12-04 DIAGNOSIS — E03.4 HYPOTHYROIDISM DUE TO ACQUIRED ATROPHY OF THYROID: ICD-10-CM

## 2023-12-04 DIAGNOSIS — C34.91 PRIMARY LUNG SQUAMOUS CELL CARCINOMA, RIGHT (HCC): ICD-10-CM

## 2023-12-04 DIAGNOSIS — E78.2 HYPERLIPIDEMIA, MIXED: ICD-10-CM

## 2023-12-04 DIAGNOSIS — D50.9 IRON DEFICIENCY ANEMIA, UNSPECIFIED IRON DEFICIENCY ANEMIA TYPE: ICD-10-CM

## 2023-12-04 DIAGNOSIS — Z23 NEED FOR IMMUNIZATION AGAINST INFLUENZA: ICD-10-CM

## 2023-12-04 DIAGNOSIS — I10 ESSENTIAL HYPERTENSION: Primary | ICD-10-CM

## 2023-12-04 DIAGNOSIS — I10 ESSENTIAL HYPERTENSION: ICD-10-CM

## 2023-12-04 LAB
ALBUMIN SERPL-MCNC: 4.4 G/DL (ref 3.4–5)
ALBUMIN/GLOB SERPL: 2.3 {RATIO} (ref 1.1–2.2)
ALP SERPL-CCNC: 68 U/L (ref 40–129)
ALT SERPL-CCNC: 14 U/L (ref 10–40)
ANION GAP SERPL CALCULATED.3IONS-SCNC: 12 MMOL/L (ref 3–16)
AST SERPL-CCNC: 14 U/L (ref 15–37)
BILIRUB SERPL-MCNC: 0.3 MG/DL (ref 0–1)
BUN SERPL-MCNC: 16 MG/DL (ref 7–20)
CALCIUM SERPL-MCNC: 9.3 MG/DL (ref 8.3–10.6)
CHLORIDE SERPL-SCNC: 103 MMOL/L (ref 99–110)
CHOLEST SERPL-MCNC: 124 MG/DL (ref 0–199)
CO2 SERPL-SCNC: 28 MMOL/L (ref 21–32)
CREAT SERPL-MCNC: 0.6 MG/DL (ref 0.8–1.3)
DEPRECATED RDW RBC AUTO: 15.8 % (ref 12.4–15.4)
FERRITIN SERPL IA-MCNC: 50.1 NG/ML (ref 30–400)
GFR SERPLBLD CREATININE-BSD FMLA CKD-EPI: >60 ML/MIN/{1.73_M2}
GLUCOSE SERPL-MCNC: 100 MG/DL (ref 70–99)
HCT VFR BLD AUTO: 41.3 % (ref 40.5–52.5)
HDLC SERPL-MCNC: 59 MG/DL (ref 40–60)
HGB BLD-MCNC: 13.5 G/DL (ref 13.5–17.5)
IRON SATN MFR SERPL: 32 % (ref 20–50)
IRON SERPL-MCNC: 91 UG/DL (ref 59–158)
LDLC SERPL CALC-MCNC: 48 MG/DL
MCH RBC QN AUTO: 30.8 PG (ref 26–34)
MCHC RBC AUTO-ENTMCNC: 32.8 G/DL (ref 31–36)
MCV RBC AUTO: 93.8 FL (ref 80–100)
PLATELET # BLD AUTO: 241 K/UL (ref 135–450)
PMV BLD AUTO: 9.5 FL (ref 5–10.5)
POTASSIUM SERPL-SCNC: 4.4 MMOL/L (ref 3.5–5.1)
PROT SERPL-MCNC: 6.3 G/DL (ref 6.4–8.2)
PSA SERPL DL<=0.01 NG/ML-MCNC: 1.4 NG/ML (ref 0–4)
RBC # BLD AUTO: 4.4 M/UL (ref 4.2–5.9)
SODIUM SERPL-SCNC: 143 MMOL/L (ref 136–145)
T4 FREE SERPL-MCNC: 1.6 NG/DL (ref 0.9–1.8)
TIBC SERPL-MCNC: 284 UG/DL (ref 260–445)
TRIGL SERPL-MCNC: 83 MG/DL (ref 0–150)
TSH SERPL DL<=0.005 MIU/L-ACNC: 2.5 UIU/ML (ref 0.27–4.2)
VLDLC SERPL CALC-MCNC: 17 MG/DL
WBC # BLD AUTO: 6.3 K/UL (ref 4–11)

## 2023-12-04 PROCEDURE — 1123F ACP DISCUSS/DSCN MKR DOCD: CPT | Performed by: INTERNAL MEDICINE

## 2023-12-04 PROCEDURE — 3075F SYST BP GE 130 - 139MM HG: CPT | Performed by: INTERNAL MEDICINE

## 2023-12-04 PROCEDURE — 99214 OFFICE O/P EST MOD 30 MIN: CPT | Performed by: INTERNAL MEDICINE

## 2023-12-04 PROCEDURE — 36415 COLL VENOUS BLD VENIPUNCTURE: CPT | Performed by: INTERNAL MEDICINE

## 2023-12-04 PROCEDURE — 3078F DIAST BP <80 MM HG: CPT | Performed by: INTERNAL MEDICINE

## 2023-12-04 NOTE — PROGRESS NOTES
profile. -     Lipid Panel; Future    COPD, severe ([address]) - COPD stable on current regiment of inhalers/meds. CONT F/U DR Gutierrez Situ    Hypothyroidism due to acquired atrophy of thyroid - Clinically hypothyroidism appears stable and will continue current dosing, also periodic monitoring of TFTs.    -     T4, Free; Future  -     TSH; Future    Hyperlipidemia, mixed - Pt will continue to work on a low fat diet and also exercise, wt loss as appropriate. Will continue periodic monitoring of fasting lipid profile, glucose, liver function. -     Comprehensive Metabolic Panel; Future  -     Lipid Panel; Future    Primary lung squamous cell carcinoma, right ([address])- CLINICALLY STABLE AND CONT F/U ONCOLOGY  -     CBC; Future    Iron deficiency anemia, unspecified iron deficiency anemia type  -     Iron and TIBC; Future  -     Ferritin; Future    Benign prostatic hyperplasia without lower urinary tract symptoms  -     PSA, Prostatic Specific Antigen;  Future    Need for immunization against influenza  -     Influenza, FLUAD, (age 72 y+), IM, Preservative Free, 0.5 mL

## 2023-12-05 ENCOUNTER — TELEPHONE (OUTPATIENT)
Dept: INTERNAL MEDICINE CLINIC | Age: 78
End: 2023-12-05

## 2023-12-05 NOTE — RESULT ENCOUNTER NOTE
Call pt, labs ok/chol ok and also w nl thyroid fxn, iron levels, no anemia present.  Cont current meds incl ferrous sulfate/iron supplement

## 2024-01-03 DIAGNOSIS — R41.3 MEMORY DEFICIT: ICD-10-CM

## 2024-01-03 RX ORDER — DONEPEZIL HYDROCHLORIDE 5 MG/1
5 TABLET, FILM COATED ORAL NIGHTLY
Qty: 90 TABLET | Refills: 1 | Status: SHIPPED | OUTPATIENT
Start: 2024-01-03

## 2024-01-15 DIAGNOSIS — D50.9 IRON DEFICIENCY ANEMIA, UNSPECIFIED IRON DEFICIENCY ANEMIA TYPE: ICD-10-CM

## 2024-01-15 RX ORDER — FERROUS SULFATE 325(65) MG
1 TABLET ORAL
Qty: 90 TABLET | Refills: 1 | Status: SHIPPED | OUTPATIENT
Start: 2024-01-15

## 2024-01-23 DIAGNOSIS — C34.31 MALIGNANT NEOPLASM OF LOWER LOBE OF RIGHT LUNG (HCC): ICD-10-CM

## 2024-01-23 RX ORDER — DRONABINOL 5 MG/1
5 CAPSULE ORAL
Qty: 60 CAPSULE | Refills: 0 | Status: SHIPPED | OUTPATIENT
Start: 2024-01-23 | End: 2024-02-22

## 2024-01-23 NOTE — TELEPHONE ENCOUNTER
Patient POA left message requesting a refill for Marinol to be sent to [facility] [address] . Pending RX to Provider to be sent to pharmacy.

## 2024-02-19 DIAGNOSIS — C34.31 MALIGNANT NEOPLASM OF LOWER LOBE OF RIGHT LUNG (HCC): ICD-10-CM

## 2024-02-19 RX ORDER — DRONABINOL 5 MG/1
5 CAPSULE ORAL
Qty: 60 CAPSULE | Refills: 0 | Status: SHIPPED | OUTPATIENT
Start: 2024-02-19 | End: 2024-03-20

## 2024-02-19 NOTE — TELEPHONE ENCOUNTER
Called pharmacy to CX RX for Marinol due to being out, had to leave a msg. Pended med to  to send to [facility].

## 2024-03-09 DIAGNOSIS — K21.9 GASTROESOPHAGEAL REFLUX DISEASE WITHOUT ESOPHAGITIS: ICD-10-CM

## 2024-03-11 PROBLEM — D50.9 IRON DEFICIENCY ANEMIA: Status: ACTIVE | Noted: 2024-03-11

## 2024-03-11 RX ORDER — PANTOPRAZOLE SODIUM 40 MG/1
40 TABLET, DELAYED RELEASE ORAL DAILY
Qty: 90 TABLET | Refills: 1 | Status: SHIPPED | OUTPATIENT
Start: 2024-03-11 | End: 2024-03-14

## 2024-03-11 NOTE — PROGRESS NOTES
Patient Name: Dawit Fernandez  Patient : 1945  Patient MRN: [id number]     Primary Oncologist: Luis Molina MD  Referring Provider: Hammad Diana MD     Date of Service: 3/14/2024      Chief Complaint:   Chief Complaint   Patient presents with    Follow-up    Results     Patient Active Problem List:     Malignant neoplasm of lower lobe of right lung      SCC (squamous cell carcinoma of lung), right     HPI:   Mr. Fernandez is a 78-year-old very pleasant gentleman with medical history significant for hypertension, COPD, hyperlipidemia, peripheral arterial disease, coronary artery disease, history of anal canal squamous cell carcinoma, status post chemoradiation therapy (completed in 2009), initially referred to me on 2018 for evaluation of clinical stage IA1 right lower lobe squamous cell lung cancer.     He stated that he has screening low dose CT scan of the chest (ordered by Dr. Soto) on 2018 and it showed 10 mm ground glass left lower lobe nodule, which is new since .     Subsequently he had PET/CT scan on 18 and it showed metabolically active 5 mm nodule in the right lower lobe, potentially malignant. There was no FDG activity associated with the ground glass left lower lobe nodule seen on prior CT scan.     Repeat CT scan on 18 showed increasing irregular right lower lobe nodule 7.9 x 7.2 mm, considered neoplastic until proven otherwise. Resolution of left lower lobe ground glass density.     He subsequently underwent CT guided biopsy of right lower lobe lung mass on 18 and final pathology was consistent with squamous cell carcinoma. He was subsequently referred to me for evaluation.     PET/CT scan done on 2018 showed slightly increased size and increased uptake of the biopsy-proven malignancy in the right lower lobe.  No findings of metastatic disease.    MRI of the brain with and without contrast done on 1/3/19 showed No acute

## 2024-03-13 ENCOUNTER — HOSPITAL ENCOUNTER (OUTPATIENT)
Dept: CT IMAGING | Age: 79
Discharge: HOME | End: 2024-03-13
Attending: INTERNAL MEDICINE
Payer: MEDICARE

## 2024-03-13 DIAGNOSIS — C34.31 MALIGNANT NEOPLASM OF LOWER LOBE OF RIGHT LUNG (HCC): ICD-10-CM

## 2024-03-13 LAB
EGFR, POC: >60 ML/MIN/1.73M2
POC CREATININE: 0.5 MG/DL (ref 0.5–1.2)

## 2024-03-13 PROCEDURE — 71260 CT THORAX DX C+: CPT

## 2024-03-13 PROCEDURE — 82565 ASSAY OF CREATININE: CPT

## 2024-03-13 PROCEDURE — 6360000004 HC RX CONTRAST MEDICATION: Performed by: INTERNAL MEDICINE

## 2024-03-13 RX ADMIN — IOPAMIDOL 75 ML: 755 INJECTION, SOLUTION INTRAVENOUS at 07:50

## 2024-03-14 ENCOUNTER — HOSPITAL ENCOUNTER (OUTPATIENT)
Dept: INFUSION THERAPY | Age: 79
Discharge: HOME | End: 2024-03-14
Payer: MEDICARE

## 2024-03-14 ENCOUNTER — OFFICE VISIT (OUTPATIENT)
Dept: ONCOLOGY | Age: 79
End: 2024-03-14

## 2024-03-14 VITALS
DIASTOLIC BLOOD PRESSURE: 59 MMHG | SYSTOLIC BLOOD PRESSURE: 88 MMHG | TEMPERATURE: 98.4 F | HEART RATE: 84 BPM | BODY MASS INDEX: 15.01 KG/M2 | HEIGHT: 66 IN | OXYGEN SATURATION: 96 % | WEIGHT: 93.4 LBS

## 2024-03-14 DIAGNOSIS — C34.31 MALIGNANT NEOPLASM OF LOWER LOBE OF RIGHT LUNG (HCC): Primary | ICD-10-CM

## 2024-03-14 DIAGNOSIS — D50.9 IRON DEFICIENCY ANEMIA, UNSPECIFIED IRON DEFICIENCY ANEMIA TYPE: ICD-10-CM

## 2024-03-14 PROCEDURE — 99211 OFF/OP EST MAY X REQ PHY/QHP: CPT

## 2024-03-14 RX ORDER — LEVOFLOXACIN 250 MG/1
250 TABLET, FILM COATED ORAL DAILY
Qty: 10 TABLET | Refills: 0 | Status: SHIPPED | OUTPATIENT
Start: 2024-03-14 | End: 2024-03-24

## 2024-03-14 RX ORDER — PREDNISOLONE ACETATE 10 MG/ML
SUSPENSION/ DROPS OPHTHALMIC
COMMUNITY
Start: 2024-01-30

## 2024-03-14 RX ORDER — MOXIFLOXACIN 5 MG/ML
SOLUTION/ DROPS OPHTHALMIC
COMMUNITY
Start: 2024-01-30

## 2024-03-14 RX ORDER — IPRATROPIUM BROMIDE AND ALBUTEROL SULFATE 2.5; .5 MG/3ML; MG/3ML
SOLUTION RESPIRATORY (INHALATION)
COMMUNITY
Start: 2024-01-29

## 2024-03-14 ASSESSMENT — PATIENT HEALTH QUESTIONNAIRE - PHQ9
SUM OF ALL RESPONSES TO PHQ QUESTIONS 1-9: 0
2. FEELING DOWN, DEPRESSED OR HOPELESS: 0
SUM OF ALL RESPONSES TO PHQ QUESTIONS 1-9: 0
SUM OF ALL RESPONSES TO PHQ9 QUESTIONS 1 & 2: 0
1. LITTLE INTEREST OR PLEASURE IN DOING THINGS: 0
SUM OF ALL RESPONSES TO PHQ QUESTIONS 1-9: 0
SUM OF ALL RESPONSES TO PHQ QUESTIONS 1-9: 0

## 2024-03-14 NOTE — PROGRESS NOTES
MA Rooming Questions  Patient: Dawit Fernandez  MRN: [id number]    Date: 3/14/2024        1. Do you have any new issues?   no         2. Do you need any refills on medications?    no    3. Have you had any imaging done since your last visit?   yes - ct chest 3/13    4. Have you been hospitalized or seen in the emergency room since your last visit here?   no    5. Did the patient have a depression screening completed today? Yes    PHQ-9 Total Score: 0 (3/14/2024  9:33 AM)       PHQ-9 Given to (if applicable):               PHQ-9 Score (if applicable):                     [] Positive     []  Negative              Does question #9 need addressed (if applicable)                     [] Yes    []  No               Karin Hollis CMA

## 2024-04-15 DIAGNOSIS — C34.31 MALIGNANT NEOPLASM OF LOWER LOBE OF RIGHT LUNG (HCC): ICD-10-CM

## 2024-04-15 RX ORDER — DRONABINOL 5 MG/1
5 CAPSULE ORAL
Qty: 60 CAPSULE | Refills: 0 | Status: SHIPPED | OUTPATIENT
Start: 2024-04-15 | End: 2024-05-15

## 2024-04-15 NOTE — TELEPHONE ENCOUNTER
Patient left message requesting a refill for DRONABINOL to be sent to [facility] Pending RX to Provider to be sent to pharmacy.

## 2024-04-19 ENCOUNTER — TELEPHONE (OUTPATIENT)
Dept: INTERNAL MEDICINE CLINIC | Age: 79
End: 2024-04-19

## 2024-04-19 DIAGNOSIS — J44.9 COPD, SEVERE (HCC): ICD-10-CM

## 2024-04-19 DIAGNOSIS — R06.02 SOB (SHORTNESS OF BREATH) ON EXERTION: ICD-10-CM

## 2024-04-19 DIAGNOSIS — F41.9 ANXIETY: Primary | ICD-10-CM

## 2024-04-19 DIAGNOSIS — C34.91 PRIMARY LUNG SQUAMOUS CELL CARCINOMA, RIGHT (HCC): ICD-10-CM

## 2024-04-19 RX ORDER — ALPRAZOLAM 0.25 MG/1
0.25 TABLET ORAL 2 TIMES DAILY PRN
Qty: 60 TABLET | Refills: 2 | Status: SHIPPED | OUTPATIENT
Start: 2024-04-19 | End: 2024-07-18

## 2024-04-19 NOTE — TELEPHONE ENCOUNTER
Pt is SOB with any activities and is refusing to take morphin, he is on hospice his nurse Shanita called and asked if he could be prescribed some advain or a medication within that range for the SOB    Shanita- [phone]

## 2024-04-22 ENCOUNTER — TELEPHONE (OUTPATIENT)
Dept: ONCOLOGY | Age: 79
End: 2024-04-22

## 2024-04-22 DIAGNOSIS — C34.31 MALIGNANT NEOPLASM OF LOWER LOBE OF RIGHT LUNG (HCC): ICD-10-CM

## 2024-04-22 RX ORDER — DRONABINOL 5 MG/1
5 CAPSULE ORAL
Qty: 180 CAPSULE | Refills: 0 | Status: SHIPPED | OUTPATIENT
Start: 2024-04-22 | End: 2024-07-21

## 2024-04-22 RX ORDER — DRONABINOL 5 MG/1
5 CAPSULE ORAL
Qty: 180 CAPSULE | Refills: 0 | Status: SHIPPED | OUTPATIENT
Start: 2024-04-22 | End: 2024-04-22 | Stop reason: SDUPTHER

## 2024-04-22 NOTE — TELEPHONE ENCOUNTER
Marinol e-scribed to [facility] in [place] for 90 day supply.  
Patient daughter called she has located marinol 5mg at [facility] in [place], Lydia would like to know if this can be sent as a 90 day RX to [facility] in [place] since it is very hard to find.    Please advise. [phone]  
FAMILY HISTORY:  No pertinent family history in first degree relatives

## 2024-05-02 RX ORDER — ASPIRIN 81 MG
81 TABLET,CHEWABLE ORAL DAILY
Qty: 90 TABLET | Refills: 1 | Status: SHIPPED | OUTPATIENT
Start: 2024-05-02

## 2024-05-14 DIAGNOSIS — I70.0 ATHEROSCLEROSIS OF AORTIC ARCH (HCC): ICD-10-CM

## 2024-05-14 DIAGNOSIS — E78.2 HYPERLIPIDEMIA, MIXED: ICD-10-CM

## 2024-05-14 DIAGNOSIS — E03.4 HYPOTHYROIDISM DUE TO ACQUIRED ATROPHY OF THYROID: ICD-10-CM

## 2024-05-14 RX ORDER — ATORVASTATIN CALCIUM 10 MG/1
10 TABLET, FILM COATED ORAL DAILY
Qty: 90 TABLET | Refills: 1 | Status: SHIPPED | OUTPATIENT
Start: 2024-05-14

## 2024-05-14 RX ORDER — LEVOTHYROXINE SODIUM 0.07 MG/1
75 TABLET ORAL DAILY
Qty: 90 TABLET | Refills: 1 | Status: SHIPPED | OUTPATIENT
Start: 2024-05-14

## 2024-06-03 ENCOUNTER — OFFICE VISIT (OUTPATIENT)
Dept: INTERNAL MEDICINE CLINIC | Age: 79
End: 2024-06-03
Payer: MEDICARE

## 2024-06-03 VITALS
WEIGHT: 89.8 LBS | BODY MASS INDEX: 14.43 KG/M2 | OXYGEN SATURATION: 87 % | HEIGHT: 66 IN | HEART RATE: 87 BPM | SYSTOLIC BLOOD PRESSURE: 120 MMHG | DIASTOLIC BLOOD PRESSURE: 56 MMHG

## 2024-06-03 DIAGNOSIS — E78.2 HYPERLIPIDEMIA, MIXED: ICD-10-CM

## 2024-06-03 DIAGNOSIS — E03.4 HYPOTHYROIDISM DUE TO ACQUIRED ATROPHY OF THYROID: ICD-10-CM

## 2024-06-03 DIAGNOSIS — I10 ESSENTIAL HYPERTENSION: ICD-10-CM

## 2024-06-03 DIAGNOSIS — C34.91 SCC (SQUAMOUS CELL CARCINOMA OF LUNG), RIGHT (HCC): ICD-10-CM

## 2024-06-03 DIAGNOSIS — S81.801A LEG WOUND, RIGHT, INITIAL ENCOUNTER: ICD-10-CM

## 2024-06-03 DIAGNOSIS — E43 SEVERE PROTEIN-CALORIE MALNUTRITION (HCC): ICD-10-CM

## 2024-06-03 DIAGNOSIS — D50.9 IRON DEFICIENCY ANEMIA, UNSPECIFIED IRON DEFICIENCY ANEMIA TYPE: ICD-10-CM

## 2024-06-03 DIAGNOSIS — M25.511 CHRONIC RIGHT SHOULDER PAIN: ICD-10-CM

## 2024-06-03 DIAGNOSIS — J96.11 CHRONIC RESPIRATORY FAILURE WITH HYPOXIA (HCC): ICD-10-CM

## 2024-06-03 DIAGNOSIS — J44.9 COPD, SEVERE (HCC): Primary | ICD-10-CM

## 2024-06-03 DIAGNOSIS — G89.29 CHRONIC RIGHT SHOULDER PAIN: ICD-10-CM

## 2024-06-03 PROBLEM — I74.3 EMBOLISM AND THROMBOSIS OF ARTERIES OF THE LOWER EXTREMITIES (HCC): Status: RESOLVED | Noted: 2023-04-12 | Resolved: 2024-06-03

## 2024-06-03 LAB
25(OH)D3 SERPL-MCNC: 42.9 NG/ML
ALBUMIN SERPL-MCNC: 4.4 G/DL (ref 3.4–5)
ALBUMIN/GLOB SERPL: 1.6 {RATIO} (ref 1.1–2.2)
ALP SERPL-CCNC: 64 U/L (ref 40–129)
ALT SERPL-CCNC: 23 U/L (ref 10–40)
ANION GAP SERPL CALCULATED.3IONS-SCNC: 13 MMOL/L (ref 3–16)
AST SERPL-CCNC: 18 U/L (ref 15–37)
BASOPHILS # BLD: 0.1 K/UL (ref 0–0.2)
BASOPHILS NFR BLD: 1.1 %
BILIRUB SERPL-MCNC: 0.6 MG/DL (ref 0–1)
BUN SERPL-MCNC: 20 MG/DL (ref 7–20)
CALCIUM SERPL-MCNC: 10.3 MG/DL (ref 8.3–10.6)
CHLORIDE SERPL-SCNC: 99 MMOL/L (ref 99–110)
CHOLEST SERPL-MCNC: 134 MG/DL (ref 0–199)
CO2 SERPL-SCNC: 31 MMOL/L (ref 21–32)
CREAT SERPL-MCNC: 0.6 MG/DL (ref 0.8–1.3)
DEPRECATED RDW RBC AUTO: 15.5 % (ref 12.4–15.4)
EOSINOPHIL # BLD: 0.1 K/UL (ref 0–0.6)
EOSINOPHIL NFR BLD: 2.1 %
FERRITIN SERPL IA-MCNC: 91.9 NG/ML (ref 30–400)
GFR SERPLBLD CREATININE-BSD FMLA CKD-EPI: >90 ML/MIN/{1.73_M2}
GLUCOSE SERPL-MCNC: 90 MG/DL (ref 70–99)
HCT VFR BLD AUTO: 42.1 % (ref 40.5–52.5)
HDLC SERPL-MCNC: 79 MG/DL (ref 40–60)
HGB BLD-MCNC: 14.1 G/DL (ref 13.5–17.5)
IRON SATN MFR SERPL: 27 % (ref 20–50)
IRON SERPL-MCNC: 79 UG/DL (ref 59–158)
LDLC SERPL CALC-MCNC: 38 MG/DL
LYMPHOCYTES # BLD: 0.9 K/UL (ref 1–5.1)
LYMPHOCYTES NFR BLD: 15.1 %
MCH RBC QN AUTO: 32.3 PG (ref 26–34)
MCHC RBC AUTO-ENTMCNC: 33.4 G/DL (ref 31–36)
MCV RBC AUTO: 96.5 FL (ref 80–100)
MONOCYTES # BLD: 0.4 K/UL (ref 0–1.3)
MONOCYTES NFR BLD: 7.1 %
NEUTROPHILS # BLD: 4.5 K/UL (ref 1.7–7.7)
NEUTROPHILS NFR BLD: 74.6 %
PLATELET # BLD AUTO: 224 K/UL (ref 135–450)
PMV BLD AUTO: 9.7 FL (ref 5–10.5)
POTASSIUM SERPL-SCNC: 4.8 MMOL/L (ref 3.5–5.1)
PROT SERPL-MCNC: 7.2 G/DL (ref 6.4–8.2)
RBC # BLD AUTO: 4.36 M/UL (ref 4.2–5.9)
SODIUM SERPL-SCNC: 143 MMOL/L (ref 136–145)
TIBC SERPL-MCNC: 293 UG/DL (ref 260–445)
TRIGL SERPL-MCNC: 87 MG/DL (ref 0–150)
TSH SERPL DL<=0.005 MIU/L-ACNC: 2.04 UIU/ML (ref 0.27–4.2)
VLDLC SERPL CALC-MCNC: 17 MG/DL
WBC # BLD AUTO: 6 K/UL (ref 4–11)

## 2024-06-03 PROCEDURE — 3023F SPIROM DOC REV: CPT | Performed by: INTERNAL MEDICINE

## 2024-06-03 PROCEDURE — 36415 COLL VENOUS BLD VENIPUNCTURE: CPT | Performed by: INTERNAL MEDICINE

## 2024-06-03 PROCEDURE — 1123F ACP DISCUSS/DSCN MKR DOCD: CPT | Performed by: INTERNAL MEDICINE

## 2024-06-03 PROCEDURE — G8419 CALC BMI OUT NRM PARAM NOF/U: HCPCS | Performed by: INTERNAL MEDICINE

## 2024-06-03 PROCEDURE — 3078F DIAST BP <80 MM HG: CPT | Performed by: INTERNAL MEDICINE

## 2024-06-03 PROCEDURE — 4004F PT TOBACCO SCREEN RCVD TLK: CPT | Performed by: INTERNAL MEDICINE

## 2024-06-03 PROCEDURE — G8427 DOCREV CUR MEDS BY ELIG CLIN: HCPCS | Performed by: INTERNAL MEDICINE

## 2024-06-03 PROCEDURE — 3074F SYST BP LT 130 MM HG: CPT | Performed by: INTERNAL MEDICINE

## 2024-06-03 PROCEDURE — 99214 OFFICE O/P EST MOD 30 MIN: CPT | Performed by: INTERNAL MEDICINE

## 2024-06-03 SDOH — ECONOMIC STABILITY: FOOD INSECURITY: WITHIN THE PAST 12 MONTHS, THE FOOD YOU BOUGHT JUST DIDN'T LAST AND YOU DIDN'T HAVE MONEY TO GET MORE.: NEVER TRUE

## 2024-06-03 SDOH — ECONOMIC STABILITY: FOOD INSECURITY: WITHIN THE PAST 12 MONTHS, YOU WORRIED THAT YOUR FOOD WOULD RUN OUT BEFORE YOU GOT MONEY TO BUY MORE.: NEVER TRUE

## 2024-06-03 SDOH — ECONOMIC STABILITY: INCOME INSECURITY: HOW HARD IS IT FOR YOU TO PAY FOR THE VERY BASICS LIKE FOOD, HOUSING, MEDICAL CARE, AND HEATING?: NOT HARD AT ALL

## 2024-06-03 NOTE — PROGRESS NOTES
Dawit Burrvan  1945 06/03/24    SUBJECTIVE:    Leg wound w dog scratching him R lat thigh a couple of months ago, no spreading redness.     Malnutrition, sometimes skips meals, rec for TID meals and bedtime snack, to try gaining 10# in the next three mo.      COPD, severe, on home O2.    Lung CA, no recurrent dz on last ct March.    Iron defic anemia  On supplement.  Last counts were ok.    Lipids, on lipitor.      Hypothyroid has been asymptomatic w/o sx of fatigue, constipation, cold intolerance, depression.    OBJECTIVE:    BP (!) 120/56 (Site: Right Upper Arm, Position: Sitting, Cuff Size: Small Adult)   Pulse 87   Ht 1.676 m (5' 5.98\")   Wt 40.7 kg (89 lb 12.8 oz)   SpO2 (!) 87%   BMI 14.50 kg/m²     Physical Exam  Vitals reviewed.   Constitutional:       General: He is not in acute distress.     Appearance: He is well-developed.   HENT:      Head: Normocephalic and atraumatic.      Mouth/Throat:      Mouth: Mucous membranes are moist.      Pharynx: Oropharynx is clear. No oropharyngeal exudate or posterior oropharyngeal erythema.   Eyes:      General: No scleral icterus.        Right eye: No discharge.         Left eye: No discharge.      Conjunctiva/sclera: Conjunctivae normal.      Pupils: Pupils are equal, round, and reactive to light.   Neck:      Thyroid: No thyromegaly.      Vascular: No JVD.      Trachea: No tracheal deviation.   Cardiovascular:      Rate and Rhythm: Normal rate and regular rhythm.      Heart sounds: Normal heart sounds. No murmur heard.     No friction rub. No gallop.   Pulmonary:      Effort: Pulmonary effort is normal. No respiratory distress.      Breath sounds: Normal breath sounds. No wheezing or rales.   Abdominal:      General: Bowel sounds are normal. There is no distension.      Palpations: Abdomen is soft. There is no mass.      Tenderness: There is no abdominal tenderness. There is no guarding or rebound.   Musculoskeletal:         General: Tenderness (r

## 2024-06-04 NOTE — RESULT ENCOUNTER NOTE
Please call pt, lab ok incl chol level, IRON LEVEL, VIT D, THYROID FXN.  HIS PROTEIN STORES ARE BETTER NOW, PLS KEEP TRYING TO BUILD WT W INCREASED LEAN PROTEIN INTAKE AS DISCUSSWED

## 2024-06-04 NOTE — PROGRESS NOTES
Dr Fuad Sanchez gave order to clamp CT at 12MN and leave clamped until CXR at 0600, if patient has any increase in SOB or hypoxemia place to suction. RN placed nursing communication order and ordered CXR per request.  Also gave verbal order that patient can get out of bed. Bed rest order discontinued. Assisted patient up tto bathroom, patient had a large brown BM without issues and ambulated back to bed. Patient had a lot of SOB with minimal exertion. Allowed time for deep breathing and patient recovered in approx 5-10 min. 04-Jun-2024 13:31

## 2024-06-07 ENCOUNTER — TELEPHONE (OUTPATIENT)
Dept: INTERNAL MEDICINE CLINIC | Age: 79
End: 2024-06-07

## 2024-06-07 NOTE — TELEPHONE ENCOUNTER
Gabrielle from [facility] called to verify weight and wanted info from visit. Let her know X-Ray ordered.

## 2024-06-24 DIAGNOSIS — R41.3 MEMORY DEFICIT: ICD-10-CM

## 2024-06-24 RX ORDER — DONEPEZIL HYDROCHLORIDE 5 MG/1
5 TABLET, FILM COATED ORAL NIGHTLY
Qty: 90 TABLET | Refills: 1 | Status: SHIPPED | OUTPATIENT
Start: 2024-06-24

## 2024-07-12 DIAGNOSIS — D50.9 IRON DEFICIENCY ANEMIA, UNSPECIFIED IRON DEFICIENCY ANEMIA TYPE: ICD-10-CM

## 2024-07-12 RX ORDER — FERROUS SULFATE 325(65) MG
1 TABLET ORAL
Qty: 90 TABLET | Refills: 1 | Status: SHIPPED | OUTPATIENT
Start: 2024-07-12

## 2024-08-06 RX ORDER — ASPIRIN 81 MG
81 TABLET,CHEWABLE ORAL DAILY
Qty: 90 TABLET | Refills: 1 | OUTPATIENT
Start: 2024-08-06

## 2024-08-31 NOTE — PROGRESS NOTES
Dawit Fernandez  1945 08/31/24    SUBJECTIVE:    Malnutrition, sometimes skips meals, rec for TID meals and bedtime snack, to try gaining 10# in the next three mo.  - WT IS DOWN 1#.  BEEN EATING TOAST.      COPD, severe, on home O2.    Lung CA, no recurrent dz on last ct March. -2024.  DOES CONT F/U DR CLAYTON.  CT AND F/U DR CLAYTON SCHED FOR LATER THIS MO    Iron defic anemia  On supplement.  Last counts were ok.    Lipids, on lipitor.      Hypothyroid has been asymptomatic w/o sx of fatigue, constipation, cold intolerance, depression.    OBJECTIVE:    BP (!) 128/48 (Site: Left Upper Arm, Position: Sitting, Cuff Size: Small Adult)   Pulse 84   Ht 1.676 m (5' 5.98\")   Wt 40 kg (88 lb 3.2 oz)   SpO2 (!) 86% Comment: Trouble warming up fingers  BMI 14.24 kg/m²     Physical Exam  Constitutional:       Appearance: Normal appearance.      Comments: EMACIATED   Cardiovascular:      Rate and Rhythm: Normal rate and regular rhythm.      Heart sounds: No murmur heard.     No gallop.   Pulmonary:      Effort: No respiratory distress.      Breath sounds: No wheezing.   Abdominal:      General: Abdomen is flat. Bowel sounds are normal. There is no distension.      Palpations: Abdomen is soft. There is no mass.      Tenderness: There is no abdominal tenderness.      Hernia: No hernia is present.   Musculoskeletal:      Right lower leg: No edema.      Left lower leg: No edema.   Neurological:      Mental Status: He is alert.   Psychiatric:         Mood and Affect: Mood normal.         ASSESSMENT:    1. Essential hypertension    2. Iron deficiency anemia, unspecified iron deficiency anemia type    3. Memory deficit    4. COPD, severe (HCC)    5. Severe protein-calorie malnutrition (HCC)    6. Hypothyroidism due to acquired atrophy of thyroid    7. Hyperlipidemia, mixed    8. SCC (squamous cell carcinoma of lung), right (HCC)    9. Atherosclerosis of aortic arch (HCC)        PLAN:    Dawit was seen today for 3 month

## 2024-09-03 ENCOUNTER — OFFICE VISIT (OUTPATIENT)
Dept: INTERNAL MEDICINE CLINIC | Age: 79
End: 2024-09-03
Payer: MEDICARE

## 2024-09-03 VITALS
BODY MASS INDEX: 14.14 KG/M2 | SYSTOLIC BLOOD PRESSURE: 128 MMHG | DIASTOLIC BLOOD PRESSURE: 48 MMHG | OXYGEN SATURATION: 86 % | WEIGHT: 88 LBS | HEIGHT: 66 IN | TEMPERATURE: 98.4 F | HEART RATE: 84 BPM

## 2024-09-03 VITALS
SYSTOLIC BLOOD PRESSURE: 128 MMHG | BODY MASS INDEX: 14.18 KG/M2 | WEIGHT: 88.2 LBS | HEART RATE: 84 BPM | OXYGEN SATURATION: 86 % | HEIGHT: 66 IN | DIASTOLIC BLOOD PRESSURE: 48 MMHG

## 2024-09-03 DIAGNOSIS — E78.2 HYPERLIPIDEMIA, MIXED: ICD-10-CM

## 2024-09-03 DIAGNOSIS — Z00.00 MEDICARE ANNUAL WELLNESS VISIT, SUBSEQUENT: Primary | ICD-10-CM

## 2024-09-03 DIAGNOSIS — R41.3 MEMORY DEFICIT: ICD-10-CM

## 2024-09-03 DIAGNOSIS — D50.9 IRON DEFICIENCY ANEMIA, UNSPECIFIED IRON DEFICIENCY ANEMIA TYPE: ICD-10-CM

## 2024-09-03 DIAGNOSIS — C34.91 SCC (SQUAMOUS CELL CARCINOMA OF LUNG), RIGHT (HCC): ICD-10-CM

## 2024-09-03 DIAGNOSIS — I10 ESSENTIAL HYPERTENSION: ICD-10-CM

## 2024-09-03 DIAGNOSIS — E43 SEVERE PROTEIN-CALORIE MALNUTRITION (HCC): ICD-10-CM

## 2024-09-03 DIAGNOSIS — I70.0 ATHEROSCLEROSIS OF AORTIC ARCH (HCC): ICD-10-CM

## 2024-09-03 DIAGNOSIS — J44.9 COPD, SEVERE (HCC): Primary | ICD-10-CM

## 2024-09-03 DIAGNOSIS — E03.4 HYPOTHYROIDISM DUE TO ACQUIRED ATROPHY OF THYROID: ICD-10-CM

## 2024-09-03 PROCEDURE — 99214 OFFICE O/P EST MOD 30 MIN: CPT | Performed by: INTERNAL MEDICINE

## 2024-09-03 PROCEDURE — 3074F SYST BP LT 130 MM HG: CPT | Performed by: INTERNAL MEDICINE

## 2024-09-03 PROCEDURE — G8419 CALC BMI OUT NRM PARAM NOF/U: HCPCS | Performed by: INTERNAL MEDICINE

## 2024-09-03 PROCEDURE — 3078F DIAST BP <80 MM HG: CPT | Performed by: INTERNAL MEDICINE

## 2024-09-03 PROCEDURE — G8427 DOCREV CUR MEDS BY ELIG CLIN: HCPCS | Performed by: INTERNAL MEDICINE

## 2024-09-03 PROCEDURE — G0439 PPPS, SUBSEQ VISIT: HCPCS | Performed by: INTERNAL MEDICINE

## 2024-09-03 PROCEDURE — 4004F PT TOBACCO SCREEN RCVD TLK: CPT | Performed by: INTERNAL MEDICINE

## 2024-09-03 PROCEDURE — 3023F SPIROM DOC REV: CPT | Performed by: INTERNAL MEDICINE

## 2024-09-03 PROCEDURE — 1123F ACP DISCUSS/DSCN MKR DOCD: CPT | Performed by: INTERNAL MEDICINE

## 2024-09-03 ASSESSMENT — LIFESTYLE VARIABLES
HOW OFTEN DO YOU HAVE A DRINK CONTAINING ALCOHOL: MONTHLY OR LESS
HOW MANY STANDARD DRINKS CONTAINING ALCOHOL DO YOU HAVE ON A TYPICAL DAY: 1 OR 2

## 2024-09-03 ASSESSMENT — PATIENT HEALTH QUESTIONNAIRE - PHQ9
2. FEELING DOWN, DEPRESSED OR HOPELESS: NOT AT ALL
SUM OF ALL RESPONSES TO PHQ QUESTIONS 1-9: 0
1. LITTLE INTEREST OR PLEASURE IN DOING THINGS: NOT AT ALL
SUM OF ALL RESPONSES TO PHQ9 QUESTIONS 1 & 2: 0

## 2024-09-03 NOTE — PATIENT INSTRUCTIONS
dried.  Try to choose whole fruit rather than fruit juice.  Eat a variety of colors.        Vegetables   These can be fresh, frozen, canned, or dried.  Beans, peas, and lentils count too.        Whole grains   Choose whole-grain breads, cereals, and noodles.  Try brown rice.        Lean proteins   These can include lean meat, poultry, fish, and eggs.  You can also have tofu, beans, peas, lentils, nuts, and seeds.        Dairy   Try milk, yogurt, and cheese.  Choose low-fat or fat-free when you can.  If you need to, use lactose-free milk or fortified plant-based milk products, such as soy milk.        Water   Drink water when you're thirsty.  Limit sugar-sweetened drinks, including soda, fruit drinks, and sports drinks.  Where can you learn more?  Go to https://www.[employer].net/patientEd and enter T756 to learn more about \"Eating Healthy Foods: Care Instructions.\"  Current as of: September 20, 2023  Content Version: 14.1  © 2006-2024 [employer].   Care instructions adapted under license by [employer]. If you have questions about a medical condition or this instruction, always ask your healthcare professional. [employer] disclaims any warranty or liability for your use of this information.           A Healthy Heart: Care Instructions  Overview     Coronary artery disease, also called heart disease, occurs when a substance called plaque builds up in the vessels that supply oxygen-rich blood to your heart muscle. This can narrow the blood vessels and reduce blood flow. A heart attack happens when blood flow is completely blocked. A high-fat diet, smoking, and other factors increase the risk of heart disease.  Your doctor has found that you have a chance of having heart disease. A heart-healthy lifestyle can help keep your heart healthy and prevent heart disease. This lifestyle includes eating healthy, being active, staying at a weight that's healthy for you, and not smoking or using

## 2024-09-03 NOTE — PROGRESS NOTES
apply: (!) Laundry, Housekeeping, Banking/Finances, Shopping, Food Preparation, Taking Medications, Transportation (son or sister-in-law)  Interventions:  Patient comments: states his son or sister-in-law help him with his ADLs.  Patient declined any further interventions or treatment     Tobacco Use:    Tobacco Use      Smoking status: Every Day        Packs/day: 2.00        Years: 2.0 packs/day for 61.7 years (123.3 ttl pk-yrs)        Types: Cigarettes        Start date: 1963        Passive exposure: Current      Smokeless tobacco: Never     Interventions:  Patient comments: patient states he enjoys smoking and is not interested in quitting.  See AVS for additional education material                      Objective   Vitals:    09/03/24 0909   BP: (!) 128/48   Pulse: 84   Temp: 98.4 °F (36.9 °C)   SpO2: (!) 86%   Weight: 39.9 kg (88 lb)   Height: 1.676 m (5' 5.98\")      Body mass index is 14.21 kg/m².                  No Known Allergies  Prior to Visit Medications    Medication Sig Taking? Authorizing Provider   ferrous sulfate (IRON 325) 325 (65 Fe) MG tablet TAKE 1 TABLET BY MOUTH EVERY DAY WITH BREAKFAST Yes Hammad Diana MD   levothyroxine (SYNTHROID) 75 MCG tablet TAKE 1 TABLET BY MOUTH EVERY DAY Yes Hammad Diana MD   atorvastatin (LIPITOR) 10 MG tablet TAKE 1 TABLET BY MOUTH EVERY DAY Yes Hammad Diana MD   ASPIRIN LOW DOSE 81 MG chewable tablet TAKE 1 TABLET BY MOUTH EVERY DAY Yes Hammad Diana MD   ipratropium 0.5 mg-albuterol 2.5 mg (DUONEB) 0.5-2.5 (3) MG/3ML SOLN nebulizer solution USE 1 VIAL VIA NEBULIZER EVERY 6 HOURS Yes Aron Felipe MD   moxifloxacin (VIGAMOX) 0.5 % ophthalmic solution PLEASE SEE ATTACHED FOR DETAILED DIRECTIONS Yes Jackson Sharma MD   prednisoLONE acetate (PRED FORTE) 1 % ophthalmic suspension 1 DROP IN RIGHT EYE FOUR TIMES A DAY START AFTER PATCH IS REMOVED AFTER SURGERY. Yes Jackson Sharma MD   albuterol sulfate HFA

## 2024-09-05 ENCOUNTER — TELEPHONE (OUTPATIENT)
Dept: ONCOLOGY | Age: 79
End: 2024-09-05

## 2024-09-05 NOTE — TELEPHONE ENCOUNTER
9/5/24 - tried to call pt - no answer and VM was full.Pt is scheduled for a CT chest on 9/16/24 at [facility] arrival time of 9:00 am and NPO 4 hours prior. I will try again. I mailed the appt info.

## 2024-09-13 ENCOUNTER — TELEPHONE (OUTPATIENT)
Dept: ONCOLOGY | Age: 79
End: 2024-09-13

## 2024-11-09 DIAGNOSIS — I70.0 ATHEROSCLEROSIS OF AORTIC ARCH (HCC): ICD-10-CM

## 2024-11-09 DIAGNOSIS — E03.4 HYPOTHYROIDISM DUE TO ACQUIRED ATROPHY OF THYROID: ICD-10-CM

## 2024-11-09 DIAGNOSIS — E78.2 HYPERLIPIDEMIA, MIXED: ICD-10-CM

## 2024-11-11 RX ORDER — ATORVASTATIN CALCIUM 10 MG/1
10 TABLET, FILM COATED ORAL DAILY
Qty: 90 TABLET | Refills: 1 | Status: SHIPPED | OUTPATIENT
Start: 2024-11-11

## 2024-11-11 RX ORDER — LEVOTHYROXINE SODIUM 75 UG/1
75 TABLET ORAL DAILY
Qty: 90 TABLET | Refills: 1 | Status: SHIPPED | OUTPATIENT
Start: 2024-11-11

## 2024-12-02 NOTE — PROGRESS NOTES
Dawit Fernandez  1945 12/02/24    SUBJECTIVE:    COPD- should be on O2 continuous but only using 50-80% per Lucien.  Is also seeing Dr Aron Felipe.      Lung CA, also due for f/u Dr Jesse Victoria, is cont to lose wt.  Trying to eat, did enjoy TGiving.    Lipids, on lipitor    Hypothyroid, some chr fatigue and SOB w COPD, we'll f/u tfts.  OBJECTIVE:    /74   Pulse 84   Resp 16   Ht 1.674 m (5' 5.9\")   Wt 39 kg (86 lb)   BMI 13.92 kg/m²     Physical Exam  Constitutional:       Appearance: Normal appearance.      Comments: EMACIATED   Cardiovascular:      Rate and Rhythm: Normal rate and regular rhythm.      Heart sounds: No murmur heard.     No gallop.   Pulmonary:      Effort: No respiratory distress.      Breath sounds: No wheezing.   Abdominal:      General: Abdomen is flat. Bowel sounds are normal. There is no distension.      Palpations: Abdomen is soft. There is no mass.      Tenderness: There is no abdominal tenderness.      Hernia: No hernia is present.   Musculoskeletal:      Right lower leg: No edema.      Left lower leg: No edema.   Neurological:      Mental Status: He is alert.   Psychiatric:         Mood and Affect: Mood normal.         ASSESSMENT:    1. Essential hypertension    2. Hypothyroidism due to acquired atrophy of thyroid    3. Hyperlipidemia, mixed    4. SCC (squamous cell carcinoma of lung), right (HCC)    5. COPD, severe (HCC)    6. Protein-calorie malnutrition, severe (HCC)        PLAN:  Blood pressure is stable and will continue regimen, check lab.    For hypothyroid, with his continued weight loss, could be developing iatrogenic hyperthyroidism, we will check if thyroid tests are in therapeutic range.  Also he is continuing statin therapy, however did encouraged to advance diet as tolerated to try to gain some weight.    He has history of squamous cell lung cancer, may be due for recheck chest CAT scan early next year, he does not have a follow-up scheduled with

## 2024-12-03 ENCOUNTER — OFFICE VISIT (OUTPATIENT)
Dept: INTERNAL MEDICINE CLINIC | Age: 79
End: 2024-12-03
Payer: MEDICARE

## 2024-12-03 VITALS
HEART RATE: 84 BPM | RESPIRATION RATE: 16 BRPM | HEIGHT: 66 IN | SYSTOLIC BLOOD PRESSURE: 136 MMHG | DIASTOLIC BLOOD PRESSURE: 74 MMHG | WEIGHT: 86 LBS | BODY MASS INDEX: 13.82 KG/M2

## 2024-12-03 DIAGNOSIS — I10 ESSENTIAL HYPERTENSION: Primary | ICD-10-CM

## 2024-12-03 DIAGNOSIS — C34.91 SCC (SQUAMOUS CELL CARCINOMA OF LUNG), RIGHT (HCC): ICD-10-CM

## 2024-12-03 DIAGNOSIS — E78.2 HYPERLIPIDEMIA, MIXED: ICD-10-CM

## 2024-12-03 DIAGNOSIS — E43 PROTEIN-CALORIE MALNUTRITION, SEVERE (HCC): ICD-10-CM

## 2024-12-03 DIAGNOSIS — E03.4 HYPOTHYROIDISM DUE TO ACQUIRED ATROPHY OF THYROID: ICD-10-CM

## 2024-12-03 DIAGNOSIS — J44.9 COPD, SEVERE (HCC): ICD-10-CM

## 2024-12-03 PROCEDURE — 1160F RVW MEDS BY RX/DR IN RCRD: CPT | Performed by: INTERNAL MEDICINE

## 2024-12-03 PROCEDURE — G8484 FLU IMMUNIZE NO ADMIN: HCPCS | Performed by: INTERNAL MEDICINE

## 2024-12-03 PROCEDURE — G8419 CALC BMI OUT NRM PARAM NOF/U: HCPCS | Performed by: INTERNAL MEDICINE

## 2024-12-03 PROCEDURE — 4004F PT TOBACCO SCREEN RCVD TLK: CPT | Performed by: INTERNAL MEDICINE

## 2024-12-03 PROCEDURE — 3075F SYST BP GE 130 - 139MM HG: CPT | Performed by: INTERNAL MEDICINE

## 2024-12-03 PROCEDURE — G8427 DOCREV CUR MEDS BY ELIG CLIN: HCPCS | Performed by: INTERNAL MEDICINE

## 2024-12-03 PROCEDURE — 3023F SPIROM DOC REV: CPT | Performed by: INTERNAL MEDICINE

## 2024-12-03 PROCEDURE — 99214 OFFICE O/P EST MOD 30 MIN: CPT | Performed by: INTERNAL MEDICINE

## 2024-12-03 PROCEDURE — 3078F DIAST BP <80 MM HG: CPT | Performed by: INTERNAL MEDICINE

## 2024-12-03 PROCEDURE — 1159F MED LIST DOCD IN RCRD: CPT | Performed by: INTERNAL MEDICINE

## 2024-12-03 PROCEDURE — 1123F ACP DISCUSS/DSCN MKR DOCD: CPT | Performed by: INTERNAL MEDICINE

## 2024-12-03 RX ORDER — ALPRAZOLAM 0.25 MG/1
0.25 TABLET ORAL NIGHTLY PRN
COMMUNITY
Start: 2024-09-28

## 2024-12-20 ENCOUNTER — HOSPITAL ENCOUNTER (OUTPATIENT)
Age: 79
Discharge: HOME | End: 2024-12-20
Payer: MEDICARE

## 2024-12-20 DIAGNOSIS — E03.4 HYPOTHYROIDISM DUE TO ACQUIRED ATROPHY OF THYROID: ICD-10-CM

## 2024-12-20 DIAGNOSIS — E78.2 HYPERLIPIDEMIA, MIXED: ICD-10-CM

## 2024-12-20 DIAGNOSIS — I10 ESSENTIAL HYPERTENSION: ICD-10-CM

## 2024-12-20 DIAGNOSIS — E43 PROTEIN-CALORIE MALNUTRITION, SEVERE (HCC): ICD-10-CM

## 2024-12-20 LAB
25(OH)D3 SERPL-MCNC: 36.4 NG/ML (ref 30–150)
ALBUMIN SERPL-MCNC: 3.9 G/DL (ref 3.4–5)
ALBUMIN/GLOB SERPL: 1.6 {RATIO} (ref 1.1–2.2)
ALP SERPL-CCNC: 59 U/L (ref 40–129)
ALT SERPL-CCNC: 16 U/L (ref 10–40)
ANION GAP SERPL CALCULATED.3IONS-SCNC: 9 MMOL/L (ref 9–17)
AST SERPL-CCNC: 16 U/L (ref 15–37)
BASOPHILS # BLD: 0.03 K/UL
BASOPHILS NFR BLD: 0 % (ref 0–1)
BILIRUB SERPL-MCNC: 0.3 MG/DL (ref 0–1)
BUN SERPL-MCNC: 19 MG/DL (ref 7–20)
CALCIUM SERPL-MCNC: 9.5 MG/DL (ref 8.3–10.6)
CHLORIDE SERPL-SCNC: 98 MMOL/L (ref 99–110)
CHOLEST SERPL-MCNC: 126 MG/DL (ref 125–199)
CO2 SERPL-SCNC: 39 MMOL/L (ref 21–32)
CREAT SERPL-MCNC: 0.8 MG/DL (ref 0.8–1.3)
EOSINOPHIL # BLD: 0.12 K/UL
EOSINOPHILS RELATIVE PERCENT: 2 % (ref 0–3)
ERYTHROCYTE [DISTWIDTH] IN BLOOD BY AUTOMATED COUNT: 13.8 % (ref 11.7–14.9)
GFR, ESTIMATED: >90 ML/MIN/1.73M2
GLUCOSE SERPL-MCNC: 64 MG/DL (ref 74–99)
HCT VFR BLD AUTO: 39 % (ref 42–52)
HDLC SERPL-MCNC: 77 MG/DL
HGB BLD-MCNC: 11.7 G/DL (ref 13.5–18)
IMM GRANULOCYTES # BLD AUTO: 0.01 K/UL
IMM GRANULOCYTES NFR BLD: 0 %
LDLC SERPL CALC-MCNC: 28 MG/DL
LYMPHOCYTES NFR BLD: 0.96 K/UL
LYMPHOCYTES RELATIVE PERCENT: 13 % (ref 24–44)
MCH RBC QN AUTO: 32 PG (ref 27–31)
MCHC RBC AUTO-ENTMCNC: 30 G/DL (ref 32–36)
MCV RBC AUTO: 106.6 FL (ref 78–100)
MONOCYTES NFR BLD: 0.64 K/UL
MONOCYTES NFR BLD: 9 % (ref 0–4)
NEUTROPHILS NFR BLD: 76 % (ref 36–66)
NEUTS SEG NFR BLD: 5.42 K/UL
PLATELET # BLD AUTO: 243 K/UL (ref 140–440)
PMV BLD AUTO: 10.1 FL (ref 7.5–11.1)
POTASSIUM SERPL-SCNC: 5 MMOL/L (ref 3.5–5.1)
PROT SERPL-MCNC: 6.3 G/DL (ref 6.4–8.2)
RBC # BLD AUTO: 3.66 M/UL (ref 4.6–6.2)
SODIUM SERPL-SCNC: 145 MMOL/L (ref 136–145)
T4 FREE SERPL-MCNC: 1.2 NG/DL (ref 0.9–1.8)
TRIGL SERPL-MCNC: 105 MG/DL
TSH SERPL DL<=0.05 MIU/L-ACNC: 3.43 UIU/ML (ref 0.27–4.2)
VIT B12 SERPL-MCNC: 603 PG/ML (ref 211–911)
WBC OTHER # BLD: 7.2 K/UL (ref 4–10.5)

## 2024-12-20 PROCEDURE — 80053 COMPREHEN METABOLIC PANEL: CPT

## 2024-12-20 PROCEDURE — 80061 LIPID PANEL: CPT

## 2024-12-20 PROCEDURE — 84443 ASSAY THYROID STIM HORMONE: CPT

## 2024-12-20 PROCEDURE — 85025 COMPLETE CBC W/AUTO DIFF WBC: CPT

## 2024-12-20 PROCEDURE — 82607 VITAMIN B-12: CPT

## 2024-12-20 PROCEDURE — 84439 ASSAY OF FREE THYROXINE: CPT

## 2024-12-20 PROCEDURE — 82306 VITAMIN D 25 HYDROXY: CPT

## 2025-01-01 ENCOUNTER — CARE COORDINATION (OUTPATIENT)
Dept: CASE MANAGEMENT | Age: 80
End: 2025-01-01

## 2025-01-01 ENCOUNTER — APPOINTMENT (OUTPATIENT)
Dept: GENERAL RADIOLOGY | Age: 80
DRG: 604 | End: 2025-01-01
Payer: MEDICARE

## 2025-01-01 ENCOUNTER — APPOINTMENT (OUTPATIENT)
Dept: CT IMAGING | Age: 80
DRG: 604 | End: 2025-01-01
Payer: MEDICARE

## 2025-01-01 ENCOUNTER — HOSPITAL ENCOUNTER (INPATIENT)
Age: 80
LOS: 1 days | Discharge: HOSPICE | DRG: 604 | End: 2025-02-11
Attending: EMERGENCY MEDICINE | Admitting: STUDENT IN AN ORGANIZED HEALTH CARE EDUCATION/TRAINING PROGRAM
Payer: MEDICARE

## 2025-01-01 VITALS
WEIGHT: 64.5 LBS | BODY MASS INDEX: 10.75 KG/M2 | HEART RATE: 80 BPM | RESPIRATION RATE: 18 BRPM | SYSTOLIC BLOOD PRESSURE: 62 MMHG | TEMPERATURE: 98.1 F | HEIGHT: 65 IN | OXYGEN SATURATION: 85 % | DIASTOLIC BLOOD PRESSURE: 47 MMHG

## 2025-01-01 DIAGNOSIS — T14.8XXA MULTIPLE SKIN TEARS: ICD-10-CM

## 2025-01-01 DIAGNOSIS — Z66 DNR (DO NOT RESUSCITATE): ICD-10-CM

## 2025-01-01 DIAGNOSIS — S01.01XA LACERATION OF SCALP, INITIAL ENCOUNTER: ICD-10-CM

## 2025-01-01 DIAGNOSIS — Z51.5 HOSPICE CARE PATIENT: ICD-10-CM

## 2025-01-01 DIAGNOSIS — Z51.5 HOSPICE CARE: ICD-10-CM

## 2025-01-01 DIAGNOSIS — S09.90XA INJURY OF HEAD, INITIAL ENCOUNTER: ICD-10-CM
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
## 2025-01-01 LAB
ALBUMIN SERPL-MCNC: 3.8 G/DL (ref 3.4–5)
ALBUMIN/GLOB SERPL: 1.5 {RATIO} (ref 1.1–2.2)
ALP SERPL-CCNC: 54 U/L (ref 40–129)
ALT SERPL-CCNC: 12 U/L (ref 10–40)
ANION GAP SERPL CALCULATED.3IONS-SCNC: 10 MMOL/L (ref 9–17)
ARTERIAL PATENCY WRIST A: ABNORMAL
AST SERPL-CCNC: 25 U/L (ref 15–37)
BASOPHILS # BLD: 0.03 K/UL
BASOPHILS NFR BLD: 0 % (ref 0–1)
BILIRUB SERPL-MCNC: 0.5 MG/DL (ref 0–1)
BODY TEMPERATURE: 37
BUN SERPL-MCNC: 20 MG/DL (ref 7–20)
CALCIUM SERPL-MCNC: 9.6 MG/DL (ref 8.3–10.6)
CHLORIDE SERPL-SCNC: 92 MMOL/L (ref 99–110)
CK SERPL-CCNC: 312 U/L (ref 26–192)
CO2 SERPL-SCNC: 41 MMOL/L (ref 21–32)
COHGB MFR BLD: 2.1 % (ref 0.5–1.5)
CREAT SERPL-MCNC: 0.8 MG/DL (ref 0.8–1.3)
EOSINOPHIL # BLD: 0.01 K/UL
EOSINOPHILS RELATIVE PERCENT: 0 % (ref 0–3)
ERYTHROCYTE [DISTWIDTH] IN BLOOD BY AUTOMATED COUNT: 13.8 % (ref 11.7–14.9)
GFR, ESTIMATED: >90 ML/MIN/1.73M2
GLUCOSE SERPL-MCNC: 96 MG/DL (ref 74–99)
HCO3 VENOUS: 43.5 MMOL/L (ref 22–29)
HCT VFR BLD AUTO: 38.5 % (ref 42–52)
HGB BLD-MCNC: 11.6 G/DL (ref 13.5–18)
IMM GRANULOCYTES # BLD AUTO: 0.05 K/UL
IMM GRANULOCYTES NFR BLD: 0 %
LACTATE BLDV-SCNC: 3 MMOL/L (ref 0.4–2)
LYMPHOCYTES NFR BLD: 0.88 K/UL
LYMPHOCYTES RELATIVE PERCENT: 8 % (ref 24–44)
MCH RBC QN AUTO: 32 PG (ref 27–31)
MCHC RBC AUTO-ENTMCNC: 30.1 G/DL (ref 32–36)
MCV RBC AUTO: 106.1 FL (ref 78–100)
METHEMOGLOBIN: 0.5 % (ref 0.5–1.5)
MONOCYTES NFR BLD: 1.03 K/UL
MONOCYTES NFR BLD: 9 % (ref 0–4)
NEUTROPHILS NFR BLD: 82 % (ref 36–66)
NEUTS SEG NFR BLD: 9.27 K/UL
OXYHGB MFR BLD: 42.7 %
PCO2 VENOUS: 99.1 MM HG (ref 38–54)
PH VENOUS: 7.26 (ref 7.32–7.43)
PLATELET # BLD AUTO: 210 K/UL (ref 140–440)
PMV BLD AUTO: 10.1 FL (ref 7.5–11.1)
PO2 VENOUS: 29.7 MM HG (ref 23–48)
POSITIVE BASE EXCESS, VEN: 12.7 MMOL/L (ref 0–3)
POTASSIUM SERPL-SCNC: 4.8 MMOL/L (ref 3.5–5.1)
PROT SERPL-MCNC: 6.5 G/DL (ref 6.4–8.2)
RBC # BLD AUTO: 3.63 M/UL (ref 4.6–6.2)
SODIUM SERPL-SCNC: 144 MMOL/L (ref 136–145)
TEXT FOR RESPIRATORY: ABNORMAL
WBC OTHER # BLD: 11.3 K/UL (ref 4–10.5)

## 2025-01-01 PROCEDURE — 96375 TX/PRO/DX INJ NEW DRUG ADDON: CPT

## 2025-01-01 PROCEDURE — 2500000003 HC RX 250 WO HCPCS: Performed by: STUDENT IN AN ORGANIZED HEALTH CARE EDUCATION/TRAINING PROGRAM

## 2025-01-01 PROCEDURE — G0378 HOSPITAL OBSERVATION PER HR: HCPCS

## 2025-01-01 PROCEDURE — 6360000002 HC RX W HCPCS: Performed by: STUDENT IN AN ORGANIZED HEALTH CARE EDUCATION/TRAINING PROGRAM

## 2025-01-01 PROCEDURE — 6370000000 HC RX 637 (ALT 250 FOR IP): Performed by: EMERGENCY MEDICINE

## 2025-01-01 PROCEDURE — 96376 TX/PRO/DX INJ SAME DRUG ADON: CPT

## 2025-01-01 PROCEDURE — 97166 OT EVAL MOD COMPLEX 45 MIN: CPT

## 2025-01-01 PROCEDURE — 6360000002 HC RX W HCPCS: Performed by: NURSE PRACTITIONER

## 2025-01-01 PROCEDURE — 73090 X-RAY EXAM OF FOREARM: CPT

## 2025-01-01 PROCEDURE — 99285 EMERGENCY DEPT VISIT HI MDM: CPT

## 2025-01-01 PROCEDURE — 80053 COMPREHEN METABOLIC PANEL: CPT

## 2025-01-01 PROCEDURE — 6370000000 HC RX 637 (ALT 250 FOR IP): Performed by: STUDENT IN AN ORGANIZED HEALTH CARE EDUCATION/TRAINING PROGRAM

## 2025-01-01 PROCEDURE — 90471 IMMUNIZATION ADMIN: CPT | Performed by: EMERGENCY MEDICINE

## 2025-01-01 PROCEDURE — 96374 THER/PROPH/DIAG INJ IV PUSH: CPT

## 2025-01-01 PROCEDURE — 73110 X-RAY EXAM OF WRIST: CPT

## 2025-01-01 PROCEDURE — 12001 RPR S/N/AX/GEN/TRNK 2.5CM/<: CPT

## 2025-01-01 PROCEDURE — 90715 TDAP VACCINE 7 YRS/> IM: CPT | Performed by: EMERGENCY MEDICINE

## 2025-01-01 PROCEDURE — 2700000000 HC OXYGEN THERAPY PER DAY

## 2025-01-01 PROCEDURE — 94761 N-INVAS EAR/PLS OXIMETRY MLT: CPT

## 2025-01-01 PROCEDURE — 96372 THER/PROPH/DIAG INJ SC/IM: CPT

## 2025-01-01 PROCEDURE — 72125 CT NECK SPINE W/O DYE: CPT

## 2025-01-01 PROCEDURE — 94640 AIRWAY INHALATION TREATMENT: CPT

## 2025-01-01 PROCEDURE — 1200000000 HC SEMI PRIVATE

## 2025-01-01 PROCEDURE — 83605 ASSAY OF LACTIC ACID: CPT

## 2025-01-01 PROCEDURE — 85025 COMPLETE CBC W/AUTO DIFF WBC: CPT

## 2025-01-01 PROCEDURE — 82550 ASSAY OF CK (CPK): CPT

## 2025-01-01 PROCEDURE — 36415 COLL VENOUS BLD VENIPUNCTURE: CPT

## 2025-01-01 PROCEDURE — 6360000002 HC RX W HCPCS: Performed by: EMERGENCY MEDICINE

## 2025-01-01 PROCEDURE — 72170 X-RAY EXAM OF PELVIS: CPT

## 2025-01-01 PROCEDURE — 97162 PT EVAL MOD COMPLEX 30 MIN: CPT

## 2025-01-01 PROCEDURE — 0HQ0XZZ REPAIR SCALP SKIN, EXTERNAL APPROACH: ICD-10-PCS | Performed by: PHYSICIAN ASSISTANT

## 2025-01-01 PROCEDURE — 70450 CT HEAD/BRAIN W/O DYE: CPT

## 2025-01-01 PROCEDURE — 82805 BLOOD GASES W/O2 SATURATION: CPT

## 2025-01-01 PROCEDURE — 71045 X-RAY EXAM CHEST 1 VIEW: CPT

## 2025-01-01 PROCEDURE — 5A0935A ASSISTANCE WITH RESPIRATORY VENTILATION, LESS THAN 24 CONSECUTIVE HOURS, HIGH NASAL FLOW/VELOCITY: ICD-10-PCS | Performed by: STUDENT IN AN ORGANIZED HEALTH CARE EDUCATION/TRAINING PROGRAM

## 2025-01-01 RX ORDER — ONDANSETRON 2 MG/ML
4 INJECTION INTRAMUSCULAR; INTRAVENOUS EVERY 30 MIN PRN
Status: DISCONTINUED | OUTPATIENT
Start: 2025-01-01 | End: 2025-01-01 | Stop reason: HOSPADM

## 2025-01-01 RX ORDER — ACETAMINOPHEN 650 MG/1
650 SUPPOSITORY RECTAL EVERY 6 HOURS PRN
Status: DISCONTINUED | OUTPATIENT
Start: 2025-01-01 | End: 2025-01-01 | Stop reason: HOSPADM

## 2025-01-01 RX ORDER — SENNOSIDES 8.8 MG/5ML
5 LIQUID ORAL 2 TIMES DAILY PRN
Qty: 105 ML | Refills: 0 | Status: SHIPPED | OUTPATIENT
Start: 2025-01-01 | End: 2025-02-15

## 2025-01-01 RX ORDER — POLYETHYLENE GLYCOL 3350 17 G/17G
17 POWDER, FOR SOLUTION ORAL DAILY PRN
Status: DISCONTINUED | OUTPATIENT
Start: 2025-01-01 | End: 2025-01-01 | Stop reason: HOSPADM

## 2025-01-01 RX ORDER — MAGNESIUM SULFATE IN WATER 40 MG/ML
2000 INJECTION, SOLUTION INTRAVENOUS PRN
Status: DISCONTINUED | OUTPATIENT
Start: 2025-01-01 | End: 2025-01-01 | Stop reason: HOSPADM

## 2025-01-01 RX ORDER — POTASSIUM CHLORIDE 1500 MG/1
40 TABLET, EXTENDED RELEASE ORAL PRN
Status: DISCONTINUED | OUTPATIENT
Start: 2025-01-01 | End: 2025-01-01 | Stop reason: HOSPADM

## 2025-01-01 RX ORDER — SODIUM CHLORIDE 0.9 % (FLUSH) 0.9 %
5-40 SYRINGE (ML) INJECTION PRN
Status: DISCONTINUED | OUTPATIENT
Start: 2025-01-01 | End: 2025-01-01 | Stop reason: HOSPADM

## 2025-01-01 RX ORDER — SODIUM CHLORIDE 9 MG/ML
INJECTION, SOLUTION INTRAVENOUS PRN
Status: DISCONTINUED | OUTPATIENT
Start: 2025-01-01 | End: 2025-01-01 | Stop reason: HOSPADM

## 2025-01-01 RX ORDER — LIDOCAINE HYDROCHLORIDE 10 MG/ML
10 INJECTION, SOLUTION INFILTRATION; PERINEURAL ONCE
Status: DISCONTINUED | OUTPATIENT
Start: 2025-01-01 | End: 2025-01-01 | Stop reason: HOSPADM

## 2025-01-01 RX ORDER — MIDODRINE HYDROCHLORIDE 5 MG/1
10 TABLET ORAL
Status: DISCONTINUED | OUTPATIENT
Start: 2025-01-01 | End: 2025-01-01

## 2025-01-01 RX ORDER — SODIUM CHLORIDE, SODIUM LACTATE, POTASSIUM CHLORIDE, AND CALCIUM CHLORIDE .6; .31; .03; .02 G/100ML; G/100ML; G/100ML; G/100ML
1000 INJECTION, SOLUTION INTRAVENOUS ONCE
Status: DISCONTINUED | OUTPATIENT
Start: 2025-01-01 | End: 2025-01-01

## 2025-01-01 RX ORDER — KETOROLAC TROMETHAMINE 30 MG/ML
7.5 INJECTION, SOLUTION INTRAMUSCULAR; INTRAVENOUS EVERY 6 HOURS PRN
Status: DISCONTINUED | OUTPATIENT
Start: 2025-01-01 | End: 2025-01-01 | Stop reason: HOSPADM

## 2025-01-01 RX ORDER — LORAZEPAM 2 MG/ML
0.5 CONCENTRATE ORAL EVERY 4 HOURS PRN
Status: DISCONTINUED | OUTPATIENT
Start: 2025-01-01 | End: 2025-01-01 | Stop reason: HOSPADM

## 2025-01-01 RX ORDER — AZITHROMYCIN 250 MG/1
250 TABLET, FILM COATED ORAL DAILY
COMMUNITY

## 2025-01-01 RX ORDER — SENNOSIDES 8.8 MG/5ML
5 LIQUID ORAL 2 TIMES DAILY PRN
Status: DISCONTINUED | OUTPATIENT
Start: 2025-01-01 | End: 2025-01-01 | Stop reason: HOSPADM

## 2025-01-01 RX ORDER — MORPHINE SULFATE 20 MG/ML
5 SOLUTION ORAL EVERY 4 HOURS PRN
Qty: 7.5 ML | Refills: 0 | Status: SHIPPED | OUTPATIENT
Start: 2025-01-01 | End: 2025-01-01

## 2025-01-01 RX ORDER — MORPHINE SULFATE 2 MG/ML
2 INJECTION, SOLUTION INTRAMUSCULAR; INTRAVENOUS
Status: COMPLETED | OUTPATIENT
Start: 2025-01-01 | End: 2025-01-01

## 2025-01-01 RX ORDER — MORPHINE SULFATE 2 MG/ML
2 INJECTION, SOLUTION INTRAMUSCULAR; INTRAVENOUS EVERY 4 HOURS PRN
Status: DISCONTINUED | OUTPATIENT
Start: 2025-01-01 | End: 2025-01-01 | Stop reason: HOSPADM

## 2025-01-01 RX ORDER — ENOXAPARIN SODIUM 100 MG/ML
30 INJECTION SUBCUTANEOUS DAILY
Status: DISCONTINUED | OUTPATIENT
Start: 2025-01-01 | End: 2025-01-01

## 2025-01-01 RX ORDER — POTASSIUM CHLORIDE 7.45 MG/ML
10 INJECTION INTRAVENOUS PRN
Status: DISCONTINUED | OUTPATIENT
Start: 2025-01-01 | End: 2025-01-01 | Stop reason: HOSPADM

## 2025-01-01 RX ORDER — MORPHINE SULFATE 20 MG/ML
5 SOLUTION ORAL EVERY 4 HOURS PRN
Status: DISCONTINUED | OUTPATIENT
Start: 2025-01-01 | End: 2025-01-01 | Stop reason: HOSPADM

## 2025-01-01 RX ORDER — ONDANSETRON 2 MG/ML
4 INJECTION INTRAMUSCULAR; INTRAVENOUS EVERY 6 HOURS PRN
Status: DISCONTINUED | OUTPATIENT
Start: 2025-01-01 | End: 2025-01-01 | Stop reason: HOSPADM

## 2025-01-01 RX ORDER — MORPHINE SULFATE 2 MG/ML
1 INJECTION, SOLUTION INTRAMUSCULAR; INTRAVENOUS EVERY 4 HOURS PRN
Status: DISCONTINUED | OUTPATIENT
Start: 2025-01-01 | End: 2025-01-01 | Stop reason: HOSPADM

## 2025-01-01 RX ORDER — ACETAMINOPHEN 500 MG
1000 TABLET ORAL ONCE
Status: COMPLETED | OUTPATIENT
Start: 2025-01-01 | End: 2025-01-01

## 2025-01-01 RX ORDER — LORAZEPAM 2 MG/ML
0.5 CONCENTRATE ORAL EVERY 4 HOURS PRN
Qty: 7.5 ML | Refills: 0 | Status: SHIPPED | OUTPATIENT
Start: 2025-01-01 | End: 2025-01-01

## 2025-01-01 RX ORDER — LORAZEPAM 2 MG/ML
0.5 CONCENTRATE ORAL EVERY 4 HOURS PRN
Qty: 30 ML | Refills: 0 | Status: SHIPPED | OUTPATIENT
Start: 2025-01-01 | End: 2025-02-15

## 2025-01-01 RX ORDER — IPRATROPIUM BROMIDE AND ALBUTEROL SULFATE 2.5; .5 MG/3ML; MG/3ML
1 SOLUTION RESPIRATORY (INHALATION)
Status: DISCONTINUED | OUTPATIENT
Start: 2025-01-01 | End: 2025-01-01 | Stop reason: HOSPADM

## 2025-01-01 RX ORDER — PREDNISONE 20 MG/1
40 TABLET ORAL DAILY
Status: DISCONTINUED | OUTPATIENT
Start: 2025-01-01 | End: 2025-01-01

## 2025-01-01 RX ORDER — SODIUM CHLORIDE, SODIUM LACTATE, POTASSIUM CHLORIDE, CALCIUM CHLORIDE 600; 310; 30; 20 MG/100ML; MG/100ML; MG/100ML; MG/100ML
INJECTION, SOLUTION INTRAVENOUS CONTINUOUS
Status: DISCONTINUED | OUTPATIENT
Start: 2025-01-01 | End: 2025-01-01

## 2025-01-01 RX ORDER — ACETAMINOPHEN 325 MG/1
650 TABLET ORAL EVERY 6 HOURS PRN
Status: DISCONTINUED | OUTPATIENT
Start: 2025-01-01 | End: 2025-01-01 | Stop reason: HOSPADM

## 2025-01-01 RX ORDER — SODIUM CHLORIDE 0.9 % (FLUSH) 0.9 %
5-40 SYRINGE (ML) INJECTION EVERY 12 HOURS SCHEDULED
Status: DISCONTINUED | OUTPATIENT
Start: 2025-01-01 | End: 2025-01-01 | Stop reason: HOSPADM

## 2025-01-01 RX ORDER — ONDANSETRON 4 MG/1
4 TABLET, ORALLY DISINTEGRATING ORAL EVERY 8 HOURS PRN
Status: DISCONTINUED | OUTPATIENT
Start: 2025-01-01 | End: 2025-01-01 | Stop reason: HOSPADM

## 2025-01-01 RX ORDER — MORPHINE SULFATE 20 MG/ML
5 SOLUTION ORAL EVERY 4 HOURS PRN
Qty: 30 ML | Refills: 0 | Status: SHIPPED | OUTPATIENT
Start: 2025-01-01 | End: 2025-02-15

## 2025-01-01 RX ORDER — GINSENG 100 MG
CAPSULE ORAL ONCE
Status: COMPLETED | OUTPATIENT
Start: 2025-01-01 | End: 2025-01-01

## 2025-01-01 RX ADMIN — ENOXAPARIN SODIUM 30 MG: 100 INJECTION SUBCUTANEOUS at 09:09

## 2025-01-01 RX ADMIN — SODIUM CHLORIDE, PRESERVATIVE FREE 10 ML: 5 INJECTION INTRAVENOUS at 07:45

## 2025-01-01 RX ADMIN — IPRATROPIUM BROMIDE AND ALBUTEROL SULFATE 1 DOSE: 2.5; .5 SOLUTION RESPIRATORY (INHALATION) at 10:48

## 2025-01-01 RX ADMIN — MORPHINE SULFATE 2 MG: 2 INJECTION, SOLUTION INTRAMUSCULAR; INTRAVENOUS at 05:06

## 2025-01-01 RX ADMIN — MORPHINE SULFATE 2 MG: 2 INJECTION, SOLUTION INTRAMUSCULAR; INTRAVENOUS at 21:04

## 2025-01-01 RX ADMIN — MORPHINE SULFATE 2 MG: 2 INJECTION, SOLUTION INTRAMUSCULAR; INTRAVENOUS at 15:25

## 2025-01-01 RX ADMIN — SODIUM CHLORIDE, PRESERVATIVE FREE 10 ML: 5 INJECTION INTRAVENOUS at 20:21

## 2025-01-01 RX ADMIN — SODIUM CHLORIDE, PRESERVATIVE FREE 10 ML: 5 INJECTION INTRAVENOUS at 21:05

## 2025-01-01 RX ADMIN — SODIUM CHLORIDE, PRESERVATIVE FREE 10 ML: 5 INJECTION INTRAVENOUS at 08:07

## 2025-01-01 RX ADMIN — IPRATROPIUM BROMIDE AND ALBUTEROL SULFATE 1 DOSE: 2.5; .5 SOLUTION RESPIRATORY (INHALATION) at 19:28

## 2025-01-01 RX ADMIN — IPRATROPIUM BROMIDE AND ALBUTEROL SULFATE 1 DOSE: 2.5; .5 SOLUTION RESPIRATORY (INHALATION) at 20:58

## 2025-01-01 RX ADMIN — TETANUS TOXOID, REDUCED DIPHTHERIA TOXOID AND ACELLULAR PERTUSSIS VACCINE, ADSORBED 0.5 ML: 5; 2.5; 8; 8; 2.5 SUSPENSION INTRAMUSCULAR at 11:33

## 2025-01-01 RX ADMIN — IPRATROPIUM BROMIDE AND ALBUTEROL SULFATE 1 DOSE: 2.5; .5 SOLUTION RESPIRATORY (INHALATION) at 07:22

## 2025-01-01 RX ADMIN — KETOROLAC TROMETHAMINE 7.5 MG: 30 INJECTION, SOLUTION INTRAMUSCULAR; INTRAVENOUS at 15:12

## 2025-01-01 RX ADMIN — BACITRACIN 4 EACH: 500 OINTMENT TOPICAL at 16:00

## 2025-01-01 RX ADMIN — IPRATROPIUM BROMIDE AND ALBUTEROL SULFATE 1 DOSE: 2.5; .5 SOLUTION RESPIRATORY (INHALATION) at 15:54

## 2025-01-01 RX ADMIN — IPRATROPIUM BROMIDE AND ALBUTEROL SULFATE 1 DOSE: 2.5; .5 SOLUTION RESPIRATORY (INHALATION) at 06:59

## 2025-01-01 RX ADMIN — ONDANSETRON 4 MG: 2 INJECTION INTRAMUSCULAR; INTRAVENOUS at 11:23

## 2025-01-01 RX ADMIN — ACETAMINOPHEN 1000 MG: 500 TABLET ORAL at 11:22

## 2025-01-01 RX ADMIN — MORPHINE SULFATE 2 MG: 2 INJECTION, SOLUTION INTRAMUSCULAR; INTRAVENOUS at 09:30

## 2025-01-01 ASSESSMENT — PAIN - FUNCTIONAL ASSESSMENT
PAIN_FUNCTIONAL_ASSESSMENT: PREVENTS OR INTERFERES WITH MANY ACTIVE NOT PASSIVE ACTIVITIES
PAIN_FUNCTIONAL_ASSESSMENT: 0-10

## 2025-01-01 ASSESSMENT — ENCOUNTER SYMPTOMS
SORE THROAT: 0
SHORTNESS OF BREATH: 0
WHEEZING: 0
CONSTIPATION: 0
COUGH: 0
NAUSEA: 0
ABDOMINAL DISTENTION: 0
BACK PAIN: 0
DIARRHEA: 0
EYE DISCHARGE: 0

## 2025-01-01 ASSESSMENT — PAIN SCALES - GENERAL
PAINLEVEL_OUTOF10: 8
PAINLEVEL_OUTOF10: 10

## 2025-01-01 ASSESSMENT — PAIN DESCRIPTION - LOCATION: LOCATION: GENERALIZED

## 2025-01-01 ASSESSMENT — PAIN DESCRIPTION - DESCRIPTORS: DESCRIPTORS: DISCOMFORT

## 2025-01-10 DIAGNOSIS — D50.9 IRON DEFICIENCY ANEMIA, UNSPECIFIED IRON DEFICIENCY ANEMIA TYPE: ICD-10-CM

## 2025-01-10 RX ORDER — FERROUS SULFATE 325(65) MG
1 TABLET ORAL
Qty: 90 TABLET | Refills: 1 | Status: SHIPPED | OUTPATIENT
Start: 2025-01-10
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
## 2025-02-09 NOTE — PROGRESS NOTES
4 Eyes Skin Assessment     NAME:  Dawit Fernandez  YOB: 1945  MEDICAL RECORD NUMBER:  [id number]    The patient is being assessed for  Admission    I agree that at least one RN has performed a thorough Head to Toe Skin Assessment on the patient. ALL assessment sites listed below have been assessed.      Areas assessed by both nurses:    Head, Face, Ears, Shoulders, Back, Chest, Arms, Elbows, Hands, Sacrum. Buttock, Coccyx, Ischium, Legs. Feet and Heels, and Under Medical Devices         Does the Patient have a Wound? Yes wound(s) were present on assessment. LDA wound assessment was Initiated and completed by RN, laceration left head, skin tear upper left arm, skin tear lower left arm       Anup Prevention initiated by RN: Yes  Wound Care Orders initiated by RN: Yes    Pressure Injury (Stage 3,4, Unstageable, DTI, NWPT, and Complex wounds) if present, place Wound referral order by RN under : Yes    New Ostomies, if present place, Ostomy referral order under : Yes     Nurse 1 eSignature: Electronically signed by Christelle Bell RN on 2/9/25 at 6:30 PM EST    **SHARE this note so that the co-signing nurse can place an eSignature**    Nurse 2 eSignature: Electronically signed by Deangelo Bai LPN on 2/9/25 at 6:54 PM EST

## 2025-02-09 NOTE — ED PROVIDER NOTES
[facility]      TRIAGE CHIEF COMPLAINT:   Fall and Head Laceration      Lummi:  Dawit Fernandez is a 79 y.o. male that presents with complaint of fall, head injury.  Patient arrives apparently he is at home alone he fell sometime between midnight and this morning.  He was found on the ground in a pool blood he has a head laceration.  He was on Eliquis he was taken off.  History of lung cancer squamous cell carcinoma.  He is on hospice, DNR CC paperwork in hand.  He arrives on oxygen and he has no complaints other than some skin tears to his arms and a headache no laceration denies any symptoms before the fall he states he got up too quick.  He is very malnourished, cachectic.  Denies other questions or concerns.  Family not present currently.  Workup initiated.    REVIEW OF SYSTEMS:  At least 10 systems reviewed and otherwise acutely negative except as in the Lummi.    Review of Systems   Constitutional:  Positive for fatigue.   HENT: Negative.     Eyes: Negative.    Respiratory: Negative.     Cardiovascular: Negative.    Gastrointestinal: Negative.    Endocrine: Negative.    Genitourinary: Negative.    Musculoskeletal:  Positive for arthralgias and myalgias.   Skin:  Positive for wound.   Allergic/Immunologic: Negative.    Neurological:  Positive for headaches.   Hematological: Negative.    Psychiatric/Behavioral: Negative.     All other systems reviewed and are negative.      Past Medical History:   Diagnosis Date    Acute deep vein thrombosis (DVT) of right popliteal vein ([facility])     admit 5/22- Dr Molina and Dr Smith to determine duration of anticoagulation( 6-12mo)    Atherosclerosis of aortic arch ([facility])     (NOTED ON CHEST CT 4/2023 ALSO.  NOTED ON CXR 1/2017-    COPD (chronic obstructive pulmonary disease) ([facility])     seeing Dr Aron Felipe    COPD, severe ([facility]) 03/14/2022    Dr Felipe    Essential hypertension 01/29/2018    Full dentures     History of external beam radiation therapy        (Please note that portions of this note may have been completed with a voice recognition program. Efforts were made to edit the dictations but occasionally words aremis-transcribed.)    DISPOSITION REFERRAL (if applicable):  No follow-up provider specified.    DISPOSITION MEDICATIONS (if applicable):  Discharge Medication List as of 2/11/2025 10:24 AM        START taking these medications    Details   senna (SENOKOT) 8.8 MG/5ML SYRP syrup Take 5 mLs by mouth 2 times daily as needed (Constipation), Disp-105 mL, R-0Normal      Morphine Sulfate (MORPHINE 20MG/ML) SOLN concentrated solution Take 0.25 mLs by mouth every 4 hours as needed for Pain for up to 5 days. Max Daily Amount: 30 mg, Disp-7.5 mL, R-0Print      LORazepam (ATIVAN) 2 MG/ML concentrated solution Take 0.25 mLs by mouth every 4 hours as needed for Anxiety for up to 5 days. Max Daily Amount: 3 mg, Disp-7.5 mL, R-0Print                DO Andre Monteiro James, DO  02/12/25 0701

## 2025-02-09 NOTE — ED NOTES
ED TO INPATIENT SBAR HANDOFF    Patient Name: Dawit Fernandez   :  1945  79 y.o.   Preferred Name  Dawit  Family/Caregiver Present yes   Restraints no   C-SSRS: Risk of Suicide: No Risk  Sitter no   Sepsis Risk Score        Situation  Chief Complaint   Patient presents with    Fall    Head Laceration     Brief Description of Patient's Condition: Pt arrived to the ED via c/o falling at home some time after midnight, hit head. Pt has overnight caregiver who found him down this morning. Lac to the left side of his head.   Mental Status: alert  Arrived from: home    Imaging:   XR RADIUS ULNA RIGHT (2 VIEWS)   Final Result      XR WRIST RIGHT (MIN 3 VIEWS)   Final Result      XR WRIST LEFT (MIN 3 VIEWS)   Final Result      XR RADIUS ULNA LEFT (2 VIEWS)   Final Result      XR PELVIS (1-2 VIEWS)   Final Result      XR CHEST PORTABLE   Final Result      CT CERVICAL SPINE WO CONTRAST   Final Result      CT HEAD WO CONTRAST   Final Result        Abnormal labs:   Abnormal Labs Reviewed   CBC WITH AUTO DIFFERENTIAL - Abnormal; Notable for the following components:       Result Value    WBC 11.3 (*)     RBC 3.63 (*)     Hemoglobin 11.6 (*)     Hematocrit 38.5 (*)     .1 (*)     MCH 32.0 (*)     MCHC 30.1 (*)     Neutrophils % 82 (*)     Lymphocytes % 8 (*)     Monocytes % 9 (*)     All other components within normal limits   COMPREHENSIVE METABOLIC PANEL - Abnormal; Notable for the following components:    Chloride 92 (*)     CO2 41 (*)     All other components within normal limits   CK - Abnormal; Notable for the following components:    Total  (*)     All other components within normal limits   LACTIC ACID - Abnormal; Notable for the following components:    Lactic Acid 3.0 (*)     All other components within normal limits   BLOOD GAS, VENOUS - Abnormal; Notable for the following components:    pH, Zach 7.260 (*)     pCO2, Zach 99.1 (*)     HCO3, Venous 43.5 (*)     Positive Base Excess, Zach 12.7 (*)      Carboxyhemoglobin 2.1 (*)     All other components within normal limits        Background  History:   Past Medical History:   Diagnosis Date    Acute deep vein thrombosis (DVT) of right popliteal vein ([facility])     admit 5/22- Dr Molina and Dr Smith to determine duration of anticoagulation( 6-12mo)    Atherosclerosis of aortic arch ([facility])     (NOTED ON CHEST CT 4/2023 ALSO.  NOTED ON CXR 1/2017-    COPD (chronic obstructive pulmonary disease) ([facility])     seeing Dr Aron Felipe    COPD, severe (HCC) 03/14/2022    Dr Felipe    Essential hypertension 01/29/2018    Full dentures     History of external beam radiation therapy 2009/2019    pelvis/anus in 2009 and right lower lobe lung mass SBRT in 2019 per Dr[facility] Ver[facility] at [facility]    Hyperlipidemia, mixed     Hypertension     Hypothyroidism due to acquired atrophy of thyroid 05/11/2021    ANTIPEROXIDASE ANTIBODY NEGATIVE    On home oxygen therapy     2l/nc at night and prn    PAD (peripheral artery disease) (HCC)     R leg on doppler 8/2017-- referring to Dr Smith    Primary lung squamous cell carcinoma, right (HCC) 12/2018    Dr Molina, Dr Mayes, Dr Veronica- treated w RT,    Pulmonary nodule, left 07/2018    Rectal cancer (HCC) 2009    Dr Robertson oncology, seen by GI at [facility]- Dr Abdifatah Casey- tx with chemo and radiation     S/P colonoscopy 05/09/2023    Dr Boudreaux, recheck prn    SCC (squamous cell carcinoma of lung), right (HCC)     Dr Molina, periodically Dr Veronica.  had resection w Dr Smith    Subclavian artery stenosis, right (HCC)     Wears glasses        Assessment    Vitals:        Vitals:    02/09/25 1032 02/09/25 1201 02/09/25 1521 02/09/25 1540   BP: (!) 106/53      Pulse: 76 84 96 (!) 111   Resp: 28 21 21 20   Temp:       TempSrc:       SpO2:   97% 91%       PO Status: Nothing by Mouth    O2 Flow Rate:        Cardiac Rhythm:     Last documented pain medication administered: See MAR    NIH Score: NIH       Active LDA's:   Peripheral IV 02/09/25 Right Antecubital (Active)

## 2025-02-09 NOTE — CARE COORDINATION
CM consult per Dr Powell to assist with discharge planning for this pt here for a fall. Pt is active with [facility].    - CM visited pt who is alone in room at this time. CM/RN introduced self and reason for visit pt is alert and oriented. Pt states he is from home, family is here with him today. Sons/Lucien and Milton are now at Bedside, introduced self, they tell me [facility] is aware pt is here in the ER.   POC pending at present.    - [facility] RN has arrived, family asking for a respite stay. JM Molina/Hospice is contacting Hospice physician or APRN-CNP for GIP orders.    - CM contacted Claudio RN/House Supervisor for possible rm on hospice unit. Waiting Hospice provider decision.    -Waiting for Hospice provider to call back for GIP orders.  -Hospice Provider has no criteria for GIP admission.  -Magda NYE-CNP/hospice visited pt and family, explained this to the family who are advocating for admission as pt lives along, family trying to plan pt care at home. Son was with pt last night when pt fell.    -Spoke with Dr Powell about POC,  feels pt needs a 24 hr/Obs stay for head injury, pt health deconditioning, falling asleep.   JM MCPHERSON/CESAR

## 2025-02-09 NOTE — ED TRIAGE NOTES
Fell some time after midnight, hit head. Has overnight caregiver who found him this morning. Lac to left side of head.     DNR-CC on hospice.    Hx cancer

## 2025-02-09 NOTE — H&P
V2.0  History and Physical      Name:  Dawit Fernandez /Age/Sex: 1945  (79 y.o. male)   MRN & CSN:  [id number] & [id number] Encounter Date/Time: 2025 4:25 PM EST   Location:  [facility]- PCP: Hammad Diana MD       Hospital Day: 1      History of Present Illness:     Chief Complaint: fall, AMS    Dawit Fernandez is a 79 y.o. male with PMH of COPD, lung cancer (now w hospice), HTN, hypothyroidism who presents with fall. Per family, pt had a fall after midnight and he was found down around 5:30 AM. No details how he had the fall. Pt is slightly confused not answering questions appropriately.  Pt has been w hospice. Pt had pan imaging no fracture or hemorrhage. Pt was found to have laceration of his left head repaired in the ED. Hospice was contacted to have pt admitted their service for overnight observation given high risk of falls, pt's confused but they didn't think pt meets criteria for inpatient hospice. Pt lives at home with son living w him. They are agreeable to revoke hospice while hospitalized until they figure out a place for him after discharge. Family was worried that pt appears less responsive than before. Agreed to get blood gas and to try vapotherm if needed.     Assessment and Plan:   Unwitnessed fall at home. Had pan imaging no hemorrhage, no fracture. S/p lac repaired in the ED left forehead.  - may need placement, PT/OT    acute metabolic encephalopathy  Pt not at baseline per family. Suspect concussion from the fall but could be from hypercapnia given COPD history  - check VBG  - delirium precautions      Hx of squamous cell cancer.   S/p radiation. Was following w Dr. Molina last visit 3/2024, recent imaging showed progression of disease.   Now w hospice.     COPD. Appears in mild exacerbation  Appears SOB w wheezing on exam.  - breathing tx  - check VBG  - steroids    Essential HTN  Await home meds    Code Status: Code status was discussed in detail with Patient/POA;

## 2025-02-09 NOTE — PROCEDURES
PROCEDURE NOTE  Date: 2/9/2025   Name: Dawit Fernandez  YOB: 1945    Lac Repair    Date/Time: 2/9/2025 9:23 AM    Performed by: Levi Vaughan PA-C  Authorized by: Benitez Powell DO    Consent:     Consent obtained:  Emergent situation  Anesthesia:     Anesthesia method:  None  Laceration details:     Location:  Scalp    Scalp location:  Crown    Wound length (cm): 3 cm, irregular.    Depth (mm):  5  Pre-procedure details:     Preparation:  Patient was prepped and draped in usual sterile fashion  Exploration:     Hemostasis achieved with:  Direct pressure    Wound exploration: wound explored through full range of motion and entire depth of wound visualized      Contaminated: no    Treatment:     Area cleansed with:  Saline    Amount of cleaning:  Standard    Irrigation solution:  Sterile saline    Irrigation volume:  1L    Irrigation method:  Pressure wash    Visualized foreign bodies/material removed: no      Debridement:  Minimal    Undermining:  None    Scar revision: no    Skin repair:     Repair method:  Staples    Number of staples:  7  Approximation:     Approximation:  Close  Repair type:     Repair type:  Intermediate  Post-procedure details:     Dressing:  Non-adherent dressing    Procedure completion:  Tolerated well, no immediate complications  Comments:      Assisted by medical student with stapling and tissue approximation.

## 2025-02-10 NOTE — CARE COORDINATION
02/10/25 1529   Service Assessment   Cognition Other (see comment)  (Non responsive just staring ahead)   Primary Caregiver Family  (son Lucien  and [facility])   Support Systems Children;Family Members   Patient's Healthcare Decision Maker is: Named in Scanned ACP Document  (MPOA daughter in-law Lydia and son Lucien)   PCP Verified by CM Yes   Last Visit to PCP Within last 6 months   Prior Functional Level Assistance with the following:   Current Functional Level Assistance with the following:   Can patient return to prior living arrangement Yes   Ability to make needs known: Poor   Family able to assist with home care needs: Yes   Financial Resources Medicare  ([employer] Medicare)   [facility]  (Active with [facility])   Social/Functional History   Lives With Son   Type of Home House   Home Layout One level   Active  No   Patient's  Info [facility] will need to take pt home.   Mode of Transportation Van   Condition of Participation: Discharge Planning   The Patient and/or Patient Representative was provided with a Choice of Provider? Patient   The Patient and/Or Patient Representative agree with the Discharge Plan? Yes   Freedom of Choice list was provided with basic dialogue that supports the patient's individualized plan of care/goals, treatment preferences, and shares the quality data associated with the providers?  Yes     Reviewed chart, discussed in IDR and spoke with both son's. Plan is to continue with [facility] hospice. Agreeable to pt being reassessed for possible respite vs inpt hospice. CM will continue to follow.

## 2025-02-10 NOTE — CARE COORDINATION
Reviewed chart , Discussed in IDR and Dr Harper spoke with family then asked that [facility] hospice come visit pt to determine if pt would be inpt hospice or have a respite stay.   Called [facility] hospice and spoke with April. April will send request to pt's team at [facility].

## 2025-02-10 NOTE — CONSULTS
[facility] ACUTE CARE PHYSICAL THERAPY EVALUATION  Dawit Fernandez, 1945, [id number], 2/10/2025    History  Lummi:  The primary encounter diagnosis was Fall, initial encounter. Diagnoses of Injury of head, initial encounter, Laceration of scalp, initial encounter, Multiple skin tears, DNR (do not resuscitate), and Hospice care patient were also pertinent to this visit.  Patient  has a past medical history of Acute deep vein thrombosis (DVT) of right popliteal vein (HCC), Atherosclerosis of aortic arch (HCC), COPD (chronic obstructive pulmonary disease) (HCC), COPD, severe (HCC), Essential hypertension, Full dentures, History of external beam radiation therapy, Hyperlipidemia, mixed, Hypertension, Hypothyroidism due to acquired atrophy of thyroid, On home oxygen therapy, PAD (peripheral artery disease) (HCC), Primary lung squamous cell carcinoma, right (HCC), Pulmonary nodule, left, Rectal cancer (HCC), S/P colonoscopy, SCC (squamous cell carcinoma of lung), right (HCC), Subclavian artery stenosis, right (HCC), and Wears glasses.  Patient  has a past surgical history that includes colectomy (2009); angioplasty (Bilateral, 2017); Cataract removal with implant (Left, 2018); Colonoscopy (last one 2017); Tonsillectomy (age 5); Dental surgery; Lung biopsy (12/07/2018); lymph node biopsy (07/2009); and Hemorrhoid surgery (2009).    Recommendation: Pt requires 24/7 assistance for mobility and ADLS. LTC with PT vs home with 24/7 dependent care. If pts tolerance and command following improves, could benefit from therapy 1-2 hours per day and 5 days per week.      Subjective:  Patient states:  \"Hi\"   Pain:  denies   Communication with other providers:   RN, co-eval with Gregg DICKEY   Restrictions: General precautions, falls     Home Setup/Prior level of function  Pt is a poor historian and communicator today. Per charting he is from home with caregivers and hospice care. Unclear what his prior level of  mobility was. Should clarify with family if able.     Examination of body systems (includes body structures/functions, activity/participation limitations):  Observation:  Semi fowlers in the bed upon arrival. Cooperative with therapy. Overall minimal communication. Impaired processing, comprehension, command following, problem solving.   Vision:  Unable to assess   Hearing:  Appeared slightly [place]   Cardiopulmonary:  Stable vitals on 5L O2.   Orientation: able to state his first name and that his birthday was the 12th month.     Musculoskeletal  ROM R/L:  WFL BLEs PROM. Unable to follow commands for any AROM.   Strength R/L:  Significant weakness to bilat LES observed in function and endurance.  Unable to follow commands for formal MMT.     Mobility/treatment:   Rolling L/R:  MaxA bilat directions   Supine to sit:  maxA for bilat LE advancement, hip scooting, and uprighting trunk.  Sit to supine: maxA for bilat LE advancement and trunk guidance   Scooting: fwd to EOB maxA. Supine to HOB dep x 2   Transfers: NT   Sitting balance: MaxA with single UE support on bed rail but was pulling himself towards bed rail. Once hand removed from bed rail and placed on bed improved balance to Sindi. Pt was unable to communicate why he wanted to return to supine but he was making gestures and moving his body back to the pillow to return to supine after only ~3 minutes.   Standing balance:  NT   Gait: NT   Educated pt on POC, role of PT. Cues provided for sequencing to inc safety and indep with mobility.     [facility] 6 Clicks Inpatient Mobility:  AM-PAC Inpatient Mobility Raw Score : 8    Safety: patient left in bed, call light within reach, RN notified, gait belt used.    Assessment:  Pt is a 79 year old male admitted with a fall at home. Recommend 24/7 dependent care and continued therapy once medically stable. Baseline function unclear with pt being a poor historian and communicator at this time. He presented today with impaired

## 2025-02-10 NOTE — PROGRESS NOTES
V2.0  [facility] Hospitalist Progress Note      Name:  Dawit Fernandez /Age/Sex: 1945  (79 y.o. male)   MRN & CSN:  [id number] & [id number] Encounter Date/Time: 2/10/2025 10:54 AM EST    Location:  [address] [place], [place] [address] PCP: Hammad Diana MD       Hospital Day: 2    Assessment and Plan:   Dawit Fernandez is a 79 y.o. male with pmh of COPD, lung cancer (now w hospice), HTN, hypothyroidism  who presents with Fall at home, initial encounter    Case discussed with patient's jose alfredo Mcgraw and 2 sisters.  Child tells me that patient's POA is Lydia has who is patient's daughter-in-law.  Lucien endorses difficulty in taking care of the patient at home given current clinical condition.  It appears family is requesting respite care.  In my opinion patient is appropriate for IPU as well.  Patient has hypercarbia and waxing and waning mental status.  Seen by OT and is max assist for all ADLs. Failed bedside swallow. Will place consult for hospice to evaluate patient for IPU and if patient deemed that he does not qualify for IPU then respite care is definitely something that the family can avail.    Plan:  Unwitnessed fall at home. Had pan imaging no hemorrhage, no fracture. S/p lac repaired in the ED left forehead.  -Patient admitted for observation as ED physician felt patient needs 24-hour ops stay for head injury and patient health deconditioning as per documentation  - may need placement, PT/OT, CM assist     Questionable acute metabolic encephalopathy  Pt not at baseline per family. Suspect concussion from the fall but could be from hypercapnia given COPD history  - VBG on admission shows elevated pCO2 however patient is alert and responsive and communicating appropriately for me.  I will not order any other invasive workup as patient is hospice care  - delirium precautions     Hx of squamous cell cancer.   S/p radiation. Was following w Dr. Molina last visit 3/2024, recent imaging showed progression of disease.   Now w  Normal rate and regular rhythm.      Pulses: Normal pulses.      Heart sounds: Normal heart sounds.   Pulmonary:      Effort: Pulmonary effort is normal.      Breath sounds: Normal breath sounds.   Abdominal:      Palpations: Abdomen is soft.   Musculoskeletal:         General: No tenderness or signs of injury. Normal range of motion.   Skin:     General: Skin is warm.      Capillary Refill: Capillary refill takes less than 2 seconds.   Neurological:      General: No focal deficit present.      Mental Status: He is alert. Mental status is at baseline.   Psychiatric:         Mood and Affect: Mood normal.         Behavior: Behavior normal.            Medications:   Medications:    lidocaine  10 mL IntraDERmal Once    ipratropium 0.5 mg-albuterol 2.5 mg  1 Dose Inhalation Q4H WA RT    sodium chloride flush  5-40 mL IntraVENous 2 times per day    enoxaparin  30 mg SubCUTAneous Daily    predniSONE  40 mg Oral Daily      Infusions:    sodium chloride       PRN Meds: ondansetron, 4 mg, Q30 Min PRN  sodium chloride flush, 5-40 mL, PRN  sodium chloride, , PRN  potassium chloride, 40 mEq, PRN   Or  potassium alternative oral replacement, 40 mEq, PRN   Or  potassium chloride, 10 mEq, PRN  magnesium sulfate, 2,000 mg, PRN  ondansetron, 4 mg, Q8H PRN   Or  ondansetron, 4 mg, Q6H PRN  polyethylene glycol, 17 g, Daily PRN  acetaminophen, 650 mg, Q6H PRN   Or  acetaminophen, 650 mg, Q6H PRN        Labs      Recent Results (from the past 24 hour(s))   Blood Gas, Venous    Collection Time: 02/09/25  3:42 PM   Result Value Ref Range    pH, Zach 7.260 (L) 7.320 - 7.430    pCO2, Zach 99.1 (H) 38 - 54 mm Hg    PO2, Zach 29.7 23 - 48 mm Hg    HCO3, Venous 43.5 (H) 22 - 29 mmol/L    Positive Base Excess, Zach 12.7 (H) 0 - 3 mmol/L    Oxyhemoglobin 42.7 %    Carboxyhemoglobin 2.1 (H) 0.5 - 1.5 %    Methemoglobin 0.5 0.5 - 1.5 %    Pt Temp 37.0     Steve Test NOT APPLICABLE     Text for Respiratory Called to THERAPIST on 02/09/2025 at 15:43

## 2025-02-10 NOTE — PROGRESS NOTES
Occupational Therapy  [facility] ACUTE CARE OCCUPATIONAL THERAPY EVALUATION    Dawit Fernandez, 1945, 4101/4101-A, 2/10/2025    Discharge Recommendation: Encourage minimal to moderate OT services at discharge, typically 1-2 hours/day, 5 days/week.     History:  [place]:  The primary encounter diagnosis was Fall, initial encounter. Diagnoses of Injury of head, initial encounter, Laceration of scalp, initial encounter, Multiple skin tears, DNR (do not resuscitate), and Hospice care patient were also pertinent to this visit.    Subjective:  Patient states: Pt mostly nonverbal this date.   Pain: Pt denied pain this date  Communication with other providers: PT JM Chavez   Restrictions: General Precautions, Fall Risk, Telemetry     Home Setup/Prior level of function:  Per case management charting, pt is from home with  hospice. Pt is a poor historian this date and unable to provide home information, should confirm information with family.     Examination:  Observation: Supine in bed upon arrival. Agreeable to evaluation.   Vision: WFL  Hearing: WFL  Vitals: Stable vitals throughout session on room air     Body Systems and functions:  ROM: Unable to assess due to poor command following   Strength: 4-/5 BL UEs (observed functionally)    Sensation: WFL  Tone: Normal  Coordination: WFL  Perception: WNL    Activities of Daily Living (ADLs):  Feeding: Max A   Grooming: Max A  UB bathing: Max A (for washing RUE)  LB bathing: Dependent (for washing bottom and BL LEs)   UB dressing: Max A (for threading BL UEs through sleeves and managing down trunk)   LB dressing: Dependent (for threading LEs through pants and managing to hips and donning BL socks)   Toileting: Dependent (for clothing mgmt both directions and jennifer care)     Cognitive and Psychosocial Functioning:  Overall cognitive status: Impaired (Pt oriented to self, demonstrates decreased alertness but able to follow commands intermittently)  SCC (squamous cell carcinoma of lung), right (HCC), Subclavian artery stenosis, right (HCC), and Wears glasses. Pt admitted for a fall and diagnosed with acute metabolic encephalopathy. Pt was receiving hospice services and lives at home alone per case management charting. Pt currently presents with the above impairments. Recommend continued OT services at discharge, typically 1-2 hours/day for 5 days/week.     Complexity: Moderate  Prognosis: Fair  Plan: 3x/week      Goals:  1. Pt will complete all aspects of bed mobility for EOB/OOB ADLs mod A with use of bed rails and HOB elevated 30'.   2. Pt will complete UB/LB bathing CGA with use of long handled sponge PRN   3. Pt will complete all aspects of LB dressing mod A with use of AE PRN   4. Pt will complete all functional transfers to and from bed, chair, toilet, shower chair mod A with good safety awareness   5. Pt will ambulate short HH distance to [facility] for toileting mod A with RW   6. Pt will complete all aspects of toileting task mod A   7. Pt will complete oral hygiene/grooming routine seated EOB mod A   8. Pt will complete ther ex/ther act with focus on UE strengthening, sitting/standing balance, and functional activity tolerance in standing >3 minutes     Time:   Time in: 915  Time out: 930  Timed treatment minutes:  5  Total time: 15      Electronically signed by:    JOSÉ MIGUEL Lucia/NOBLE Smith OTR/L, MOT, [id number]     \"I, the qualified professional, was present and in the room for the entire session. The student participates in the delivery of services when the qualified practitioner is directing the service, making the skilled judgment, and is responsible for the assessment and treatment.\"

## 2025-02-10 NOTE — PLAN OF CARE
Problem: Discharge Planning  Goal: Discharge to home or other facility with appropriate resources  Outcome: Progressing     Problem: Pain  Goal: Verbalizes/displays adequate comfort level or baseline comfort level  Outcome: Progressing     Problem: Skin/Tissue Integrity  Goal: Skin integrity remains intact  Description: 1.  Monitor for areas of redness and/or skin breakdown  2.  Assess vascular access sites hourly  3.  Every 4-6 hours minimum:  Change oxygen saturation probe site  4.  Every 4-6 hours:  If on nasal continuous positive airway pressure, respiratory therapy assess nares and determine need for appliance change or resting period  Outcome: Progressing     Problem: Safety - Adult  Goal: Free from fall injury  Outcome: Progressing

## 2025-02-10 NOTE — CONSULTS
[facility] Continence Nurse  Consult Note       Dawit Fernandez  AGE: 79 y.o.   GENDER: male  : 1945  TODAY'S DATE:  2/10/2025    Subjective:     Reason for Evaluation and Assessment: wound assessment       Dawit Fernandez is a 79 y.o. male referred by:   [x] Physician  [] Nursing  [] Other:     Wound Identification:  Wound Type: traumatic and skin tear  Contributing Factors: shear force and malnutrition        PAST MEDICAL HISTORY        Diagnosis Date    Acute deep vein thrombosis (DVT) of right popliteal vein (HCC)     admit - Dr Molina and Dr Smith to determine duration of anticoagulation( 6-12mo)    Atherosclerosis of aortic arch (HCC)     (NOTED ON CHEST CT 2023 ALSO.  NOTED ON CXR 2017-    COPD (chronic obstructive pulmonary disease) (HCC)     seeing Dr Aron Felipe    COPD, severe (HCC) 2022    Dr Felipe    Essential hypertension 2018    Full dentures     History of external beam radiation therapy     pelvis/anus in  and right lower lobe lung mass SBRT in  per Dr. Veronica at [facility]    Hyperlipidemia, mixed     Hypertension     Hypothyroidism due to acquired atrophy of thyroid 2021    ANTIPEROXIDASE ANTIBODY NEGATIVE    On home oxygen therapy     2l/nc at night and prn    PAD (peripheral artery disease) (HCC)     R leg on doppler 2017-- referring to Dr Smith    Primary lung squamous cell carcinoma, right (HCC) 2018    Dr Molina, Dr Mayes, Dr Veronica- treated w RT,    Pulmonary nodule, left 2018    Rectal cancer (HCC)     Dr Robertson oncology, seen by GI at [facility]- Dr Abdifatah Casey- tx with chemo and radiation     S/P colonoscopy 2023    Dr Boudreaux, arya prn    SCC (squamous cell carcinoma of lung), right (HCC)     Dr Molina, periodically Dr Veronica.  had resection w Dr Smith    Subclavian artery stenosis, right (HCC)     Wears glasses        PAST SURGICAL HISTORY    Past Surgical History:   Procedure Laterality Date    ANGIOPLASTY  Bilateral 2017    Dr Kimberly Smith for bilat leg PVD    CATARACT REMOVAL WITH IMPLANT Left 2018    Dr Del Rosario    COLECTOMY  2009    Dr Emery then needed revision at [facility]    COLONOSCOPY  last one 2017    DENTAL SURGERY      HEMORRHOID SURGERY  2009    LUNG BIOPSY  12/07/2018    LYMPH NODE BIOPSY  07/2009    right axilla    TONSILLECTOMY  age 5       FAMILY HISTORY    Family History   Problem Relation Age of Onset    Cirrhosis Mother     Lung Cancer Father     Brain Cancer Father     Other Sister         bilat knee replacements    No Known Problems Sister        SOCIAL HISTORY    Social History     Tobacco Use    Smoking status: Every Day     Current packs/day: 2.00     Average packs/day: 2.0 packs/day for 62.1 years (124.2 ttl pk-yrs)     Types: Cigarettes     Start date: 1963     Passive exposure: Current    Smokeless tobacco: Never   Vaping Use    Vaping status: Former   Substance Use Topics    Alcohol use: Yes     Alcohol/week: 20.0 standard drinks of alcohol     Types: 20 Cans of beer per week     Comment: average\"3 beers per day\" per pt on 12/6/2018    Drug use: No       ALLERGIES    No Known Allergies    MEDICATIONS    No current facility-administered medications on file prior to encounter.     Current Outpatient Medications on File Prior to Encounter   Medication Sig Dispense Refill    azithromycin (ZITHROMAX) 250 MG tablet Take 1 tablet by mouth daily      ferrous sulfate (IRON 325) 325 (65 Fe) MG tablet TAKE 1 TABLET BY MOUTH EVERY DAY WITH BREAKFAST 90 tablet 1    ALPRAZolam (XANAX) 0.25 MG tablet Take 1 tablet by mouth nightly as needed.      levothyroxine (SYNTHROID) 75 MCG tablet TAKE 1 TABLET BY MOUTH EVERY DAY 90 tablet 1    atorvastatin (LIPITOR) 10 MG tablet TAKE 1 TABLET BY MOUTH EVERY DAY 90 tablet 1    ASPIRIN LOW DOSE 81 MG chewable tablet TAKE 1 TABLET BY MOUTH EVERY DAY 90 tablet 1    ipratropium 0.5 mg-albuterol 2.5 mg (DUONEB) 0.5-2.5 (3) MG/3ML SOLN nebulizer solution USE 1 VIAL VIA NEBULIZER

## 2025-02-10 NOTE — PROGRESS NOTES
Comprehensive Nutrition Assessment    Type and Reason for Visit:   (poor appetite/po intake)    Nutrition Recommendations/Plan:   TF recs in if needed. Use if appropriate/pt unable to have diet safely.   Please obtain a daily measured body weight for pt.   Will monitor pt's GI status, pt's diet advancement and tolerance/progression of nutrition, lytes, labs, and POC.   Will follow pt as a high nutrition risk at this time.      Malnutrition Assessment:  Malnutrition Status:  Insufficient data (02/10/25 1346)    Context:  Chronic Illness     Findings of the 6 clinical characteristics of malnutrition:  Energy Intake:  Unable to assess  Weight Loss:  Unable to assess     Body Fat Loss:  Unable to assess     Muscle Mass Loss:  Unable to assess    Fluid Accumulation:  Unable to assess     Strength:  Not Performed    Nutrition Assessment:    Pt admitted after fall at home. Pt has a pmh of COPD, lung cancer (now w hospice), HTN, hypothyroidism. Hospice was contacted but didn't think he met criteria for inpatient hospice per H&P. Pt currently lives at home with son.Family agreeable to revoke hospice while hospitalized until they figure out a place for him after discharge. Pt currently has no diet order. Per MD, MD advised the nurse to do a bedside eval. Per RN, pt did not stay awake for bedside eval. No po intake noted as pt has no diet order. Should pt not be able to safely consume meals, recs for TF are as follows:Jevity 1.5 , at a goal rate of 40 mL/hr. and 125 mL Q4 water flushes. This would provide ~1440 total kcals, 61 g/day of protein, and ~1480 total fluids/day. Please note recs were determined using a wt with a not specified wt source. Will follow pt as a high nutrition risk.    Nutrition Related Findings:    MAP 62,  Lactic acid 3,  RBC 3.63m Hemoglobin 11.6, deltasone, NaCl Wound Type: Multiple, Skin Tears (skin tears , traumatic per LDAs.)       Current Nutrition Intake & Therapies:    Average Meal Intake:  Unable to assess (no diet order ordered)  Average Supplements Intake: None Ordered  No diet orders on file    Anthropometric Measures:  Height: 165.1 cm (5' 5\")  Ideal Body Weight (IBW): 136 lbs (62 kg)       Current Body Weight: 29.3 kg (64 lb 9.5 oz) (2/10/25 not specified source), 47.5 % IBW. Weight Source: Bed scale  Current BMI (kg/m2): 10.7  Usual Body Weight:  (none with specified wt sources/limited wt history (recent wt hx))        Weight Adjustment For: No Adjustment                 BMI Categories: Underweight (BMI less than 22) age over 65    Estimated Daily Nutrient Needs:  Energy Requirements Based On: Kcal/kg  Weight Used for Energy Requirements: Current  Energy (kcal/day): 0345-0442 kcals/day (45-50 kcals/kg CBW)  Weight Used for Protein Requirements: Current  Protein (g/day): 74-93 (1.2-1.5 IBW)  Method Used for Fluid Requirements: 1 ml/kcal  Fluid (ml/day): 1400 mL/day    Nutrition Diagnosis:   Predicted inadequate energy intake related to   as evidenced by NPO or clear liquid status due to medical condition, other, BMI (no diet order, hospice not in center at this time)    Nutrition Interventions:      Nutrition Education/Counseling: No recommendation at this time  Coordination of Nutrition Care: Continue to monitor while inpatient  Plan of Care discussed with: RN, MD    Goals:  Goals: Initiation of nutrition, Maintain adequate nutrition status, Initiate PO diet, Initiate nutrition support, Other, Meet at least 75% of estimated needs  Type of Goal: New goal  Previous Goal Met: New Goal    Nutrition Monitoring and Evaluation:   Behavioral-Environmental Outcomes: None Identified  Food/Nutrient Intake Outcomes: Progression of Nutrition  Physical Signs/Symptoms Outcomes: Biochemical Data, Nutrition Focused Physical Findings, Weight, GI Status, Meal Time Behavior    Discharge Planning:    Too soon to determine     Angel Dodge RD  Contact: [phone]

## 2025-02-11 NOTE — CARE COORDINATION
Spoke with family and they are wanting to get pt home ASAP with [facility]. PS to Dr Mancera.        0930 Dr Mancera will be up to see pt soon, set up transport with [facility] for 1100.  Called Yara at [facility].  Family agreeable with plan.

## 2025-02-11 NOTE — PLAN OF CARE
Problem: Discharge Planning  Goal: Discharge to home or other facility with appropriate resources  Outcome: Progressing     Problem: Pain  Goal: Verbalizes/displays adequate comfort level or baseline comfort level  Outcome: Progressing     Problem: Skin/Tissue Integrity  Goal: Skin integrity remains intact  Description: 1.  Monitor for areas of redness and/or skin breakdown  2.  Assess vascular access sites hourly  3.  Every 4-6 hours minimum:  Change oxygen saturation probe site  4.  Every 4-6 hours:  If on nasal continuous positive airway pressure, respiratory therapy assess nares and determine need for appliance change or resting period  Outcome: Progressing     Problem: Safety - Adult  Goal: Free from fall injury  Outcome: Progressing     Problem: Nutrition Deficit:  Goal: Optimize nutritional status  Outcome: Progressing

## 2025-02-11 NOTE — PROGRESS NOTES
RN notified of patient having severe pain along with hypotension.  He is pending for hospice inpatient at this time.  Just spoke with Milton and Lydia Washington his 2 medical POA's and they do want him to be kept comfortable knowing that his blood pressure will likely bottom out he may not make it through the night.  They are just going to contact some other family members who may want to come and see him and then Lydia has will call his primary nurse to let her know to go ahead and give the IV morphine for his comfort.  They do not want any excessive measures done whatsoever.  Unable to give him IV fluids for pressure support as his lungs sound wet on exam and he is unable to take anything by mouth.

## 2025-02-12 NOTE — CARE COORDINATION
Care Transitions Note    Initial Call - Call within 2 business days of discharge: Yes    Attempted to reach caregiverLydia   for transitions of care follow up. Unable to reach Lydia montez  .    Outreach Attempts:   HIPAA compliant voicemail left for caregiverLydia.     Patient: Dawit Fernandez    Patient : 1945   MRN: [id number]    Reason for Admission: Lung CA  Discharge Date: 25  RURS: Readmission Risk Score: 15.6    Last Discharge Facility       Date Complaint Diagnosis Description Type Department Provider    25 Fall; Head Laceration Fall, initial encounter ... ED to Hosp-Admission (Discharged) (ADMITTED) Mary Mcintosh MD; Andre, ...            Was this an external facility discharge? No      Future Appointments         Provider Specialty Dept Phone    3/4/2025 9:15 AM Hammad Diana MD Internal Medicine [phone]    2025 9:00 AM Virgil Regan [facility] Internal Medicine [phone]            Plan for follow-up on next business day.      Samantha Torres RN

## 2025-02-13 NOTE — CARE COORDINATION
Care Transitions Note    Initial Call - Call within 2 business days of discharge: Yes    Attempted to reach caregiverLydia  for transitions of care follow up. Unable to reach Lydia montez .    Outreach Attempts:   HIPAA compliant voicemail left for caregiverLydia.     Patient: Dawit Fernandez    Patient : 1945   MRN: [id number]    Reason for Admission: Lung CA  Discharge Date: 25  RURS: Readmission Risk Score: 15.6    Last Discharge Facility       Date Complaint Diagnosis Description Type Department Provider    25 Fall; Head Laceration Fall, initial encounter ... ED to Hosp-Admission (Discharged) (ADMITTED) Mary Mcintosh MD; Andre, ...            Was this an external facility discharge? No    Follow Up Appointment:   Patient does not have a follow up appointment scheduled at time of call.  Home with hospice  Future Appointments         Provider Specialty Dept Phone    3/4/2025 9:15 AM Hammad Diana MD Internal Medicine [phone]    2025 9:00 AM [facility] Internal Medicine [phone]            Pt home with hospice      Samantha Torres RN

## 2025-02-18 ENCOUNTER — TELEPHONE (OUTPATIENT)
Dept: INTERNAL MEDICINE CLINIC | Age: 80
End: 2025-02-18

## 2025-02-24 NOTE — DISCHARGE SUMMARY
no suprapubic tenderness  Musculoskeletal: No edema  Skin: warm, dry  Neuro: Alert.  Psych: Mood appropriate.         Labs and Imaging   No results found.    CBC: No results for input(s): \"WBC\", \"HGB\", \"PLT\" in the last 72 hours.  BMP:  No results for input(s): \"NA\", \"K\", \"CL\", \"CO2\", \"BUN\", \"CREATININE\", \"GLUCOSE\" in the last 72 hours.  Hepatic: No results for input(s): \"AST\", \"ALT\", \"BILITOT\", \"ALKPHOS\" in the last 72 hours.    Invalid input(s): \"ALB\"  Lipids:   Lab Results   Component Value Date/Time    CHOL 126 12/20/2024 11:21 AM    HDL 77 12/20/2024 11:21 AM    TRIG 105 12/20/2024 11:21 AM     Hemoglobin A1C: No results found for: \"LABA1C\"  TSH:   Lab Results   Component Value Date/Time    TSH 3.43 12/20/2024 11:21 AM     Troponin:   Lab Results   Component Value Date/Time    TROPONINT <0.010 05/22/2022 10:04 AM    TROPONINT 0.020 07/22/2021 08:40 PM    TROPONINT 0.028 07/22/2021 05:24 PM     Lactic Acid: No results for input(s): \"LACTA\" in the last 72 hours.  BNP: No results for input(s): \"PROBNP\" in the last 72 hours.  UA:  Lab Results   Component Value Date/Time    NITRU NEGATIVE 05/22/2022 12:35 PM    COLORU YELLOW 05/22/2022 12:35 PM    PHUR 6.0 05/22/2022 12:35 PM    WBCUA 4 05/22/2022 12:35 PM    RBCUA 1 05/22/2022 12:35 PM    MUCUS FEW 05/22/2022 12:35 PM    TRICHOMONAS NONE SEEN 05/22/2022 12:35 PM    BACTERIA NEGATIVE 05/22/2022 12:35 PM    CLARITYU CLEAR 05/22/2022 12:35 PM    LEUKOCYTESUR NEGATIVE 05/22/2022 12:35 PM    UROBILINOGEN 2.0 05/22/2022 12:35 PM    BILIRUBINUR NEGATIVE 05/22/2022 12:35 PM    BLOODU NEGATIVE 05/22/2022 12:35 PM    GLUCOSEU NEGATIVE 05/22/2022 12:35 PM    KETUA 15 05/22/2022 12:35 PM     Urine Cultures: No results found for: \"LABURIN\"  Blood Cultures: No results found for: \"BC\"  No results found for: \"BLOODCULT2\"  Organism: No results found for: \"ORG\"    Time Spent Discharging patient 40 minutes    Electronically signed by Mary Mancera MD on 2/11/2025 at 4:59 PM

## 2025-03-18 NOTE — PROGRESS NOTES
Physician Progress Note      PATIENT:               ANNMARIE PERALTA  CSN #:                  [id number]  :                       1945  ADMIT DATE:       2025 9:04 AM  DISCH DATE:        2025 11:40 AM  RESPONDING  PROVIDER #:        Robb Harper MD          QUERY TEXT:    Pt admitted - with fall, mild exacerbation of COPD. Pt noted to have on   admit VBG: pH 7.26, pCO2 99.1. If possible, please document in the progress   notes and discharge summary if you are evaluating and/or treating any of the   following:    The medical record reflects the following:  Risk Factors: 79 year old male PMH lung cancer, COPD  Clinical Indicators: on Admit VBG pH 7.26, pCO2 99.1. Carbon Dioxide: 41. RR   22, 30, 28  IM 2/10: \" Patient has hypercarbia and waxing and waning mental status.   Questionable acute metabolic encephalopathy. Pt not at baseline per family. -   VBG on admission shows elevated pCO2 however patient is alert and responsive   and communicating appropriately for me.  I will not order any other invasive   workup as patient is hospice care.\"  RD 2/10: \"Per RN, pt did not stay awake for bedside eval.\"  H&P: \"COPD. Appears in mild exacerbation. Appears SOB w wheezing on exam.\"  ED: \"Mental Status: He is alert and oriented to person, place, and time.\"  Treatment: HFNC 5L, case management consult, breathing tx, PO Prednisone  Options provided:  -- Acute respiratory failure with hypercarbia.  -- Other - I will add my own diagnosis  -- Disagree - Not applicable / Not valid  -- Disagree - Clinically unable to determine / Unknown  -- Refer to Clinical Documentation Reviewer    PROVIDER RESPONSE TEXT:    This patient is in acute respiratory failure with hypercarbia.    Query created by: Heather Machado on 2025 11:41 AM      Electronically signed by:  Robb Harper MD 3/18/2025 1:48 PM